# Patient Record
Sex: FEMALE | Race: WHITE | NOT HISPANIC OR LATINO | Employment: FULL TIME | URBAN - METROPOLITAN AREA
[De-identification: names, ages, dates, MRNs, and addresses within clinical notes are randomized per-mention and may not be internally consistent; named-entity substitution may affect disease eponyms.]

---

## 2017-08-17 ASSESSMENT — ENCOUNTER SYMPTOMS
HOT FLASHES: 0
POSTURAL DYSPNEA: 0
SYNCOPE: 0
LEG PAIN: 1
LEG SWELLING: 0
LOSS OF CONSCIOUSNESS: 0
COUGH DISTURBING SLEEP: 0
HYPOTENSION: 0
BACK PAIN: 1
SKIN CHANGES: 0
SPUTUM PRODUCTION: 0
HEADACHES: 0
WEAKNESS: 0
EYE WATERING: 0
ARTHRALGIAS: 1
SEIZURES: 0
WHEEZING: 0
PALPITATIONS: 0
DOUBLE VISION: 0
JOINT SWELLING: 0
PARALYSIS: 0
HYPERTENSION: 0
MUSCLE WEAKNESS: 0
POOR WOUND HEALING: 0
EYE REDNESS: 0
SPEECH CHANGE: 0
SHORTNESS OF BREATH: 0
MUSCLE CRAMPS: 0
STIFFNESS: 1
TINGLING: 0
DYSPNEA ON EXERTION: 1
RESPIRATORY PAIN: 0
EXERCISE INTOLERANCE: 1
NAIL CHANGES: 0
TREMORS: 0
MEMORY LOSS: 1
NECK PAIN: 1
NUMBNESS: 0
HEMOPTYSIS: 0
SNORES LOUDLY: 0
DIZZINESS: 1
ORTHOPNEA: 0
EYE IRRITATION: 1
EYE PAIN: 0
SLEEP DISTURBANCES DUE TO BREATHING: 0
DECREASED LIBIDO: 1
DISTURBANCES IN COORDINATION: 0
LIGHT-HEADEDNESS: 1
CLAUDICATION: 0
COUGH: 0
TACHYCARDIA: 0
MYALGIAS: 1

## 2017-08-17 ASSESSMENT — ACTIVITIES OF DAILY LIVING (ADL)
DO_MEMBERS_OF_YOUR_HOUSEHOLD_WEAR_SEAT_BELTS?: Y
DO_MEMBERS_OF_YOUR_HOUSEHOLD_USE_SAFETY_HELMETS?: Y
DO_MEMBERS_OF_YOUR_HOUSEHOLD_USE_SUNSCREEN?: Y
ARE_THERE_SMOKE_DETECTORS_IN_YOUR_HOME?: Y
ARE_THERE_CARBON_MONOXIDE_DETECTORS_IN_YOUR_HOME?: Y
ARE_THERE_FIREARMS_IN_YOUR_HOME?: N

## 2017-08-31 ENCOUNTER — OFFICE VISIT (OUTPATIENT)
Dept: FAMILY MEDICINE | Facility: CLINIC | Age: 27
End: 2017-08-31

## 2017-08-31 VITALS
BODY MASS INDEX: 18.39 KG/M2 | WEIGHT: 117.4 LBS | HEART RATE: 62 BPM | DIASTOLIC BLOOD PRESSURE: 73 MMHG | RESPIRATION RATE: 20 BRPM | SYSTOLIC BLOOD PRESSURE: 118 MMHG

## 2017-08-31 DIAGNOSIS — E03.9 ACQUIRED HYPOTHYROIDISM: ICD-10-CM

## 2017-08-31 DIAGNOSIS — H53.9 VISION CHANGES: ICD-10-CM

## 2017-08-31 DIAGNOSIS — E03.9 ACQUIRED HYPOTHYROIDISM: Primary | ICD-10-CM

## 2017-08-31 DIAGNOSIS — L70.0 ACNE VULGARIS: ICD-10-CM

## 2017-08-31 DIAGNOSIS — N94.6 DYSMENORRHEA: ICD-10-CM

## 2017-08-31 DIAGNOSIS — Z23 ENCOUNTER FOR IMMUNIZATION: ICD-10-CM

## 2017-08-31 LAB — TSH SERPL DL<=0.005 MIU/L-ACNC: 0.69 MU/L (ref 0.4–4)

## 2017-08-31 RX ORDER — DESOGESTREL AND ETHINYL ESTRADIOL 0.15-0.03
1 KIT ORAL DAILY
Qty: 84 TABLET | Refills: 4 | Status: SHIPPED | OUTPATIENT
Start: 2017-08-31 | End: 2018-09-25

## 2017-08-31 RX ORDER — CLINDAMYCIN PHOSPHATE 10 UG/ML
LOTION TOPICAL 2 TIMES DAILY
Qty: 60 ML | Refills: 3 | Status: SHIPPED | OUTPATIENT
Start: 2017-08-31 | End: 2021-06-16

## 2017-08-31 ASSESSMENT — ANXIETY QUESTIONNAIRES
GAD7 TOTAL SCORE: 11
7. FEELING AFRAID AS IF SOMETHING AWFUL MIGHT HAPPEN: MORE THAN HALF THE DAYS
1. FEELING NERVOUS, ANXIOUS, OR ON EDGE: MORE THAN HALF THE DAYS
5. BEING SO RESTLESS THAT IT IS HARD TO SIT STILL: SEVERAL DAYS
IF YOU CHECKED OFF ANY PROBLEMS ON THIS QUESTIONNAIRE, HOW DIFFICULT HAVE THESE PROBLEMS MADE IT FOR YOU TO DO YOUR WORK, TAKE CARE OF THINGS AT HOME, OR GET ALONG WITH OTHER PEOPLE: VERY DIFFICULT
2. NOT BEING ABLE TO STOP OR CONTROL WORRYING: MORE THAN HALF THE DAYS
3. WORRYING TOO MUCH ABOUT DIFFERENT THINGS: MORE THAN HALF THE DAYS
6. BECOMING EASILY ANNOYED OR IRRITABLE: SEVERAL DAYS

## 2017-08-31 ASSESSMENT — PATIENT HEALTH QUESTIONNAIRE - PHQ9
5. POOR APPETITE OR OVEREATING: SEVERAL DAYS
SUM OF ALL RESPONSES TO PHQ QUESTIONS 1-9: 4

## 2017-08-31 ASSESSMENT — PAIN SCALES - GENERAL: PAINLEVEL: NO PAIN (0)

## 2017-08-31 NOTE — MR AVS SNAPSHOT
After Visit Summary   8/31/2017    Mikaela Vernon    MRN: [id number]           Patient Information     Date Of Birth          1990        Visit Information        Provider Department      8/31/2017 8:55 AM Seth Albarran MD [facility]        Today's Diagnoses     Acquired hypothyroidism    -  1    Dysmenorrhea        Encounter for immunization        Vision changes        Acne vulgaris          Care Instructions    Primary Care Center Medication Refill Request Information:  * Please contact your pharmacy regarding ANY request for medication refills.  ** [facility] Prescription Fax = [phone]  * Please allow 3 business days for routine medication refills.  * Please allow 5 business days for controlled substance medication refills.     Primary Care Center Test Result notification information:  *You will be notified with in 7-10 days of your appointment day regarding the results of your test.  If you are on MyChart you will be notified as soon as the provider has reviewed the results and signed off on them.    Primary Care Center [phone]   Mikaela was seen today for medication refill.    Diagnoses and all orders for this visit:    Acquired hypothyroidism  -     TSH with free T4 reflex; Future    Dysmenorrhea  -     desogestrel-ethinyl estradiol (APRI) 0.15-30 MG-MCG per tablet; Take 1 tablet by mouth daily    Encounter for immunization  -     HPV VACCINE (GARDASIL), QUADRAVALENT    Vision changes  -     OPHTHALMOLOGY ADULT REFERRAL    Acne vulgaris  -     clindamycin (CLEOCIN T) 1 % lotion; Apply topically 2 times daily    Annual visit  Best wishes,  Seth Albarran MD          Follow-ups after your visit        Additional Services     OPHTHALMOLOGY ADULT REFERRAL       Your provider has referred you to: [facility]: Eye Clinic [facility] [phone]   [email or web address]    Please be aware that coverage of these services is subject to the  terms and limitations of your health insurance plan.  Call member services at your health plan with any benefit or coverage questions.      Please bring the following with you to your appointment:    (1) Any X-Rays, CTs or MRIs which have been performed.  Contact the facility where they were done to arrange for  prior to your scheduled appointment.    (2) List of current medications  (3) This referral request   (4) Any documents/labs given to you for this referral                  Follow-up notes from your care team     Write patient with results Return in about 1 year (around 8/31/2018).      Your next 10 appointments already scheduled     Aug 31, 2017 10:30 AM CDT   LAB with  LAB   [facility] ([facility])    [address]  [address] [facility] [place] [facility] [address] [phone]           Patient must bring picture ID. Patient should be prepared to give a urine specimen  Please do not eat 10-12 hours before your appointment if you are coming in fasting for labs on lipids, cholesterol, or glucose (sugar). Pregnant women should follow their Care Team instructions. Water with medications is okay. Do not drink coffee or other fluids. If you have concerns about taking  your medications, please ask at office or if scheduling via [employer], send a message by clicking on Secure Messaging, Message Your Care Team.            Sep 05, 2017  1:00 PM CDT   (Arrive by 12:45 PM)   NEW GENERAL with [facility] ([facility])    [address] [place] [address]  [address]th [facility] [address] [phone]              Future tests that were ordered for you today     Open Future Orders        Priority Expected Expires Ordered    TSH with free T4 reflex Routine 8/31/2017 8/31/2018 8/31/2017            Who to contact     Please call your clinic at [phone] to:    Ask questions about your health    Make or cancel appointments    Discuss your  medicines    Learn about your test results    Speak to your doctor   If you have compliments or concerns about an experience at your clinic, or if you wish to file a complaint, please contact [facility] Patient Relations at [phone] or email us at [email or web address]         Additional Information About Your Visit        [facility] Information     [employer] gives you secure access to your electronic health record. If you see a primary care provider, you can also send messages to your care team and make appointments. If you have questions, please call your primary care clinic.  If you do not have a primary care provider, please call [phone] and they will assist you.      [employer] is an electronic gateway that provides easy, online access to your medical records. With [employer], you can request a clinic appointment, read your test results, renew a prescription or communicate with your care team.     To access your existing account, please contact your [facility] or call [phone] for assistance.        Care EveryWhere ID     This is your Care EveryWhere ID. This could be used by other organizations to access your [place] medical records  [id number]        Your Vitals Were     Pulse Respirations BMI (Body Mass Index)             62 20 18.39 kg/m2          Blood Pressure from Last 3 Encounters:   08/31/17 118/73   11/30/16 121/84   11/07/16 104/71    Weight from Last 3 Encounters:   08/31/17 53.3 kg (117 lb 6.4 oz)   11/30/16 53.1 kg (117 lb 1.6 oz)   11/07/16 50.3 kg (111 lb)              We Performed the Following     HPV VACCINE (GARDASIL), QUADRAVALENT     OPHTHALMOLOGY ADULT REFERRAL          Today's Medication Changes          These changes are accurate as of: 8/31/17 10:03 AM.  If you have any questions, ask your nurse or doctor.               Start taking these medicines.        Dose/Directions    clindamycin 1 % lotion   Commonly known  as:  CLEOCIN T   Used for:  Acne vulgaris   Started by:  Seth Albarran MD        Apply topically 2 times daily   Quantity:  60 mL   Refills:  3         Stop taking these medicines if you haven't already. Please contact your care team if you have questions.     BENEFIBER Powd   Stopped by:  Seth Albarran MD           BENEFIBER Tabs   Stopped by:  Seth Albarran MD           methylcellulose 500 MG Tabs tablet   Commonly known as:  CITRUCEL   Stopped by:  Seth Albarran MD                Where to get your medicines      These medications were sent to [id number] - [place], [place] - [address] AT [facility] [address]  [address], [place] [address]     Phone:  [phone]     clindamycin 1 % lotion    desogestrel-ethinyl estradiol 0.15-30 MG-MCG per tablet                Primary Care Provider Office Phone # Fax #    Seth Albarran [phone]       [address] [facility] [address]        Equal Access to Services     [facility]: Hadii aad ku hadasho Soomaali, waaxda luqadaha, qaybta kaalmada adeegyada, waxze cruz . So [facility] [phone].    ATENCIÓN: Si habla español, tiene a brunner disposición servicios gratuitos de asistencia lingüística. Llame al [phone].    We comply with applicable federal civil rights laws and [place] laws. We do not discriminate on the basis of race, color, national origin, age, disability sex, sexual orientation or gender identity.            Thank you!     Thank you for choosing [facility]  for your care. Our goal is always to provide you with excellent care. Hearing back from our patients is one way we can continue to improve our services. Please take a few minutes to complete the written survey that you may receive in the mail after your visit with us. Thank you!             Your Updated Medication List - Protect others around you: Learn how to safely use,  store and throw away your medicines at www.disposemymeds.org.          This list is accurate as of: 8/31/17 10:03 AM.  Always use your most recent med list.                   Brand Name Dispense Instructions for use Diagnosis    clindamycin 1 % lotion    CLEOCIN T    60 mL    Apply topically 2 times daily    Acne vulgaris       desogestrel-ethinyl estradiol 0.15-30 MG-MCG per tablet    APRI    84 tablet    Take 1 tablet by mouth daily    Dysmenorrhea       levothyroxine 100 MCG tablet    SYNTHROID/LEVOTHROID    90 tablet    Take 1 tablet (100 mcg) by mouth daily    Acquired hypothyroidism       ONE-A-DAY WOMENS PO

## 2017-08-31 NOTE — PATIENT INSTRUCTIONS
[facility] Medication Refill Request Information:  * Please contact your pharmacy regarding ANY request for medication refills.  ** [facility] Prescription Fax = [phone]  * Please allow 3 business days for routine medication refills.  * Please allow 5 business days for controlled substance medication refills.     [facility] Test Result notification information:  *You will be notified with in 7-10 days of your appointment day regarding the results of your test.  If you are on MyChart you will be notified as soon as the provider has reviewed the results and signed off on them.    [facility] [phone]   Mikaela was seen today for medication refill.    Diagnoses and all orders for this visit:    Acquired hypothyroidism  -     TSH with free T4 reflex; Future    Dysmenorrhea  -     desogestrel-ethinyl estradiol (APRI) 0.15-30 MG-MCG per tablet; Take 1 tablet by mouth daily    Encounter for immunization  -     HPV VACCINE (GARDASIL), QUADRAVALENT    Vision changes  -     OPHTHALMOLOGY ADULT REFERRAL    Acne vulgaris  -     clindamycin (CLEOCIN T) 1 % lotion; Apply topically 2 times daily    Annual visit  Best Seth berger MD
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
## 2017-08-31 NOTE — NURSING NOTE
Chief Complaint   Patient presents with     Medication Refill     patient states she is here for a medication refill     MORE RUVALCABA at 9:06 AM on 8/31/2017.

## 2017-08-31 NOTE — NURSING NOTE
Patient received HPV # 3 vaccine.  See immunization list for administration details.  Patient tolerated injection well.     MORE RUVALCABA at 10:16 AM on 8/31/2017.

## 2017-09-01 ASSESSMENT — ANXIETY QUESTIONNAIRES: GAD7 TOTAL SCORE: 11

## 2017-10-10 DIAGNOSIS — E03.9 ACQUIRED HYPOTHYROIDISM: ICD-10-CM

## 2017-10-10 RX ORDER — LEVOTHYROXINE SODIUM 100 UG/1
100 TABLET ORAL DAILY
Qty: 90 TABLET | Refills: 2 | Status: SHIPPED | OUTPATIENT
Start: 2017-10-10 | End: 2018-06-23

## 2017-10-10 NOTE — TELEPHONE ENCOUNTER
levothyroxine   Last Written Prescription Date:  9/29/16  Last Fill Quantity: 90,   # refills: 3  Last Office Visit : 8/31/17  Future Office visit:  no  TSH   Date Value Ref Range Status   08/31/2017 0.69 0.40 - 4.00 mU/L Final

## 2017-12-13 ENCOUNTER — OFFICE VISIT (OUTPATIENT)
Dept: FAMILY MEDICINE | Facility: CLINIC | Age: 27
End: 2017-12-13
Payer: COMMERCIAL

## 2017-12-13 VITALS
WEIGHT: 124 LBS | TEMPERATURE: 98.2 F | DIASTOLIC BLOOD PRESSURE: 71 MMHG | BODY MASS INDEX: 19.46 KG/M2 | SYSTOLIC BLOOD PRESSURE: 104 MMHG | OXYGEN SATURATION: 98 % | HEART RATE: 72 BPM | HEIGHT: 67 IN

## 2017-12-13 DIAGNOSIS — R52 GENERALIZED PAIN: Primary | ICD-10-CM

## 2017-12-13 DIAGNOSIS — Z76.89 ESTABLISHING CARE WITH NEW DOCTOR, ENCOUNTER FOR: ICD-10-CM

## 2017-12-13 LAB
ERYTHROCYTE [SEDIMENTATION RATE] IN BLOOD: 10 MM/HR (ref 0–20)
RHEUMATOID FACT SERPL-ACNC: <15 IU/ML (ref 0–30)
TSH SERPL DL<=0.05 MIU/L-ACNC: 3.81 UIU/ML (ref 0.3–5)

## 2017-12-13 ASSESSMENT — PATIENT HEALTH QUESTIONNAIRE - PHQ9: SUM OF ALL RESPONSES TO PHQ QUESTIONS 1-9: 12

## 2017-12-13 NOTE — MR AVS SNAPSHOT
After Visit Summary   12/13/2017    Mikaela Vernon    MRN: [id number]           Patient Information     Date Of Birth          1990        Visit Information        Provider Department      12/13/2017 9:20 AM Tawny Price,  [facility]        Today's Diagnoses     Generalized pain    -  1      Care Instructions    It was nice to meet you today.  Keep walking your dog, and doing physical activity that you are able to tolerate.    We are checking labs today and we can talk about them at a follow up visit.    Return for follow up in the next 2-3 weeks.  Call if you have any questions or concerns.          Follow-ups after your visit        Who to contact     Please call your clinic at [phone] to:    Ask questions about your health    Make or cancel appointments    Discuss your medicines    Learn about your test results    Speak to your doctor   If you have compliments or concerns about an experience at your clinic, or if you wish to file a complaint, please contact [facility] Patient Relations at [phone] or email us at [email or web address]         Additional Information About Your Visit        MyChart Information     [employer] gives you secure access to your electronic health record. If you see a primary care provider, you can also send messages to your care team and make appointments. If you have questions, please call your primary care clinic.  If you do not have a primary care provider, please call [phone] and they will assist you.      [employer] is an electronic gateway that provides easy, online access to your medical records. With [employer], you can request a clinic appointment, read your test results, renew a prescription or communicate with your care team.     To access your existing account, please contact your [facility] or call [phone] for assistance.        Care EveryWhere ID     This is your  "Care EveryWhere ID. This could be used by other organizations to access your [place] medical records  [id number]        Your Vitals Were     Pulse Temperature Height Last Period Pulse Oximetry BMI (Body Mass Index)    72 98.2  F (36.8  C) 5' 7\" (170.2 cm) 11/01/2017 98% 19.42 kg/m2       Blood Pressure from Last 3 Encounters:   12/13/17 104/71   08/31/17 118/73   11/30/16 121/84    Weight from Last 3 Encounters:   12/13/17 124 lb (56.2 kg)   08/31/17 117 lb 6.4 oz (53.3 kg)   11/30/16 117 lb 1.6 oz (53.1 kg)              We Performed the Following     Antinuclear Ab [place] ([facility])     Erythrocyte Sedimentation Rate ([facility])     Rheumatoid Factor Quant ([facility])     TSH  Sensitive ([facility])        Primary Care Provider Office Phone # Fax #    Seth Albarran [phone]       [address] [facility] [address]        Equal Access to Services     [facility]: Hadii honey ku hadasho Soomaali, waaxda luqadaha, qaybta kaalmada adeegyajay, fe cruz . So [facility] [phone].    ATENCIÓN: Si habla español, tiene a brunner disposición servicios gratuitos de asistencia lingüística. Llame al [phone].    We comply with applicable federal civil rights laws and [place] laws. We do not discriminate on the basis of race, color, national origin, age, disability, sex, sexual orientation, or gender identity.            Thank you!     Thank you for choosing [facility]  for your care. Our goal is always to provide you with excellent care. Hearing back from our patients is one way we can continue to improve our services. Please take a few minutes to complete the written survey that you may receive in the mail after your visit with us. Thank you!             Your Updated Medication List - Protect others around you: Learn how to safely use, store and throw away your medicines at www.disposemymeds.org.          This list is accurate as of: 12/13/17 10:34 AM.  Always " use your most recent med list.                   Brand Name Dispense Instructions for use Diagnosis    clindamycin 1 % lotion    CLEOCIN T    60 mL    Apply topically 2 times daily    Acne vulgaris       desogestrel-ethinyl estradiol 0.15-30 MG-MCG per tablet    APRI    84 tablet    Take 1 tablet by mouth daily    Dysmenorrhea       levothyroxine 100 MCG tablet    SYNTHROID/LEVOTHROID    90 tablet    Take 1 tablet (100 mcg) by mouth daily    Acquired hypothyroidism       ONE-A-DAY WOMENS PO

## 2017-12-13 NOTE — PATIENT INSTRUCTIONS
It was nice to meet you today.  Keep walking your dog, and doing physical activity that you are able to tolerate.    We are checking labs today and we can talk about them at a follow up visit.    Return for follow up in the next 2-3 weeks.  Call if you have any questions or concerns.
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
## 2017-12-13 NOTE — PROGRESS NOTES
Preceptor attestation:  Patient seen and discussed with the resident. Assessment and plan reviewed with resident and agreed upon.  Supervising physician: Stevne Farfan  [facility]

## 2017-12-14 LAB — ANA SER QL: 1.7 U

## 2018-01-30 ENCOUNTER — COMMUNICATION - HEALTHEAST (OUTPATIENT)
Dept: TELEHEALTH | Facility: CLINIC | Age: 28
End: 2018-01-30

## 2018-01-30 ENCOUNTER — OFFICE VISIT - HEALTHEAST (OUTPATIENT)
Dept: FAMILY MEDICINE | Facility: CLINIC | Age: 28
End: 2018-01-30

## 2018-01-30 DIAGNOSIS — F32.A DEPRESSION: ICD-10-CM

## 2018-01-30 DIAGNOSIS — F41.9 ANXIETY: ICD-10-CM

## 2018-01-30 DIAGNOSIS — K64.9 HEMORRHOID: ICD-10-CM

## 2018-01-30 DIAGNOSIS — G89.29 CHRONIC PAIN: ICD-10-CM

## 2018-01-30 DIAGNOSIS — Z82.69 FAMILY HISTORY OF FIBROMYALGIA: ICD-10-CM

## 2018-01-30 DIAGNOSIS — E03.9 HYPOTHYROID: ICD-10-CM

## 2018-01-30 DIAGNOSIS — F84.0 AUTISM: ICD-10-CM

## 2018-01-30 DIAGNOSIS — F43.10 PTSD (POST-TRAUMATIC STRESS DISORDER): ICD-10-CM

## 2018-01-30 ASSESSMENT — MIFFLIN-ST. JEOR: SCORE: 1287.88

## 2018-02-19 ENCOUNTER — COMMUNICATION - HEALTHEAST (OUTPATIENT)
Dept: FAMILY MEDICINE | Facility: CLINIC | Age: 28
End: 2018-02-19

## 2018-03-19 ENCOUNTER — AMBULATORY - HEALTHEAST (OUTPATIENT)
Dept: RHEUMATOLOGY | Facility: CLINIC | Age: 28
End: 2018-03-19

## 2018-03-27 ENCOUNTER — COMMUNICATION - HEALTHEAST (OUTPATIENT)
Dept: RHEUMATOLOGY | Facility: CLINIC | Age: 28
End: 2018-03-27

## 2018-03-27 ENCOUNTER — AMBULATORY - HEALTHEAST (OUTPATIENT)
Dept: RHEUMATOLOGY | Facility: CLINIC | Age: 28
End: 2018-03-27

## 2018-03-27 ENCOUNTER — OFFICE VISIT - HEALTHEAST (OUTPATIENT)
Dept: RHEUMATOLOGY | Facility: CLINIC | Age: 28
End: 2018-03-27

## 2018-03-27 DIAGNOSIS — G89.29 CHRONIC BILATERAL THORACIC BACK PAIN: ICD-10-CM

## 2018-03-27 DIAGNOSIS — R74.8 ELEVATED CK: ICD-10-CM

## 2018-03-27 DIAGNOSIS — M79.605 PAIN IN BOTH LOWER EXTREMITIES: ICD-10-CM

## 2018-03-27 DIAGNOSIS — Z86.59 HISTORY OF POSTTRAUMATIC STRESS DISORDER (PTSD): ICD-10-CM

## 2018-03-27 DIAGNOSIS — M70.50 ANSERINE BURSITIS: ICD-10-CM

## 2018-03-27 DIAGNOSIS — M70.62 TROCHANTERIC BURSITIS OF BOTH HIPS: ICD-10-CM

## 2018-03-27 DIAGNOSIS — F41.9 ANXIETY: ICD-10-CM

## 2018-03-27 DIAGNOSIS — E03.9 HYPOTHYROID: ICD-10-CM

## 2018-03-27 DIAGNOSIS — M79.604 PAIN IN BOTH LOWER EXTREMITIES: ICD-10-CM

## 2018-03-27 DIAGNOSIS — M54.6 CHRONIC BILATERAL THORACIC BACK PAIN: ICD-10-CM

## 2018-03-27 DIAGNOSIS — M54.5 CHRONIC BILATERAL LOW BACK PAIN, WITH SCIATICA PRESENCE UNSPECIFIED: ICD-10-CM

## 2018-03-27 DIAGNOSIS — M70.61 TROCHANTERIC BURSITIS OF BOTH HIPS: ICD-10-CM

## 2018-03-27 DIAGNOSIS — G89.29 CHRONIC BILATERAL LOW BACK PAIN, WITH SCIATICA PRESENCE UNSPECIFIED: ICD-10-CM

## 2018-03-27 DIAGNOSIS — M79.7 FIBROMYALGIA: ICD-10-CM

## 2018-03-27 LAB
25(OH)D3 SERPL-MCNC: 62.4 NG/ML (ref 30–80)
ALBUMIN SERPL-MCNC: 3.7 G/DL (ref 3.5–5)
ALT SERPL W P-5'-P-CCNC: 13 U/L (ref 0–45)
AST SERPL W P-5'-P-CCNC: 19 U/L (ref 0–40)
C REACTIVE PROTEIN LHE: 0.3 MG/DL (ref 0–0.8)
CALCIUM SERPL-MCNC: 9.5 MG/DL (ref 8.5–10.5)
CCP AB SER IA-ACNC: 5 U/ML
CK SERPL-CCNC: 390 U/L (ref 30–190)
CREAT SERPL-MCNC: 0.78 MG/DL (ref 0.6–1.1)
ERYTHROCYTE [DISTWIDTH] IN BLOOD BY AUTOMATED COUNT: 11.9 % (ref 11–14.5)
ERYTHROCYTE [SEDIMENTATION RATE] IN BLOOD BY WESTERGREN METHOD: 10 MM/HR (ref 0–20)
GFR SERPL CREATININE-BSD FRML MDRD: >60 ML/MIN/1.73M2
HCT VFR BLD AUTO: 36.9 % (ref 35–47)
HGB BLD-MCNC: 12.9 G/DL (ref 12–16)
MCH RBC QN AUTO: 31.3 PG (ref 27–34)
MCHC RBC AUTO-ENTMCNC: 34.8 G/DL (ref 32–36)
MCV RBC AUTO: 90 FL (ref 80–100)
PLATELET # BLD AUTO: 311 THOU/UL (ref 140–440)
PMV BLD AUTO: 7.3 FL (ref 7–10)
RBC # BLD AUTO: 4.11 MILL/UL (ref 3.8–5.4)
WBC: 5.2 THOU/UL (ref 4–11)

## 2018-03-27 ASSESSMENT — MIFFLIN-ST. JEOR: SCORE: 1319.63

## 2018-03-28 LAB
HCV AB SERPL QL IA: NEGATIVE
HLA-B27 RESULT - HISTORICAL: NEGATIVE
INTERPRETATION: NORMAL
TSH SERPL DL<=0.005 MIU/L-ACNC: 85.98 UIU/ML (ref 0.3–5)

## 2018-03-29 ENCOUNTER — COMMUNICATION - HEALTHEAST (OUTPATIENT)
Dept: RHEUMATOLOGY | Facility: CLINIC | Age: 28
End: 2018-03-29

## 2018-03-29 ENCOUNTER — AMBULATORY - HEALTHEAST (OUTPATIENT)
Dept: RHEUMATOLOGY | Facility: CLINIC | Age: 28
End: 2018-03-29

## 2018-03-29 DIAGNOSIS — Z82.69 FAMILY HISTORY OF FIBROMYALGIA: ICD-10-CM

## 2018-03-29 DIAGNOSIS — G89.29 CHRONIC PAIN: ICD-10-CM

## 2018-04-17 ENCOUNTER — OFFICE VISIT - HEALTHEAST (OUTPATIENT)
Dept: PHYSICAL THERAPY | Facility: REHABILITATION | Age: 28
End: 2018-04-17

## 2018-04-17 DIAGNOSIS — G89.4 CHRONIC PAIN SYNDROME: ICD-10-CM

## 2018-04-17 DIAGNOSIS — M62.81 GENERALIZED MUSCLE WEAKNESS: ICD-10-CM

## 2018-04-17 DIAGNOSIS — M79.7 FIBROMYALGIA: ICD-10-CM

## 2018-04-19 ENCOUNTER — COMMUNICATION - HEALTHEAST (OUTPATIENT)
Dept: RHEUMATOLOGY | Facility: CLINIC | Age: 28
End: 2018-04-19

## 2018-05-01 ENCOUNTER — OFFICE VISIT - HEALTHEAST (OUTPATIENT)
Dept: PHYSICAL THERAPY | Facility: REHABILITATION | Age: 28
End: 2018-05-01

## 2018-05-01 DIAGNOSIS — M62.81 GENERALIZED MUSCLE WEAKNESS: ICD-10-CM

## 2018-05-01 DIAGNOSIS — M79.7 FIBROMYALGIA: ICD-10-CM

## 2018-05-01 DIAGNOSIS — G89.4 CHRONIC PAIN SYNDROME: ICD-10-CM

## 2018-05-08 ENCOUNTER — OFFICE VISIT - HEALTHEAST (OUTPATIENT)
Dept: BEHAVIORAL HEALTH | Facility: CLINIC | Age: 28
End: 2018-05-08

## 2018-05-08 ENCOUNTER — COMMUNICATION - HEALTHEAST (OUTPATIENT)
Dept: PHYSICAL THERAPY | Facility: REHABILITATION | Age: 28
End: 2018-05-08

## 2018-05-08 DIAGNOSIS — Z86.59 HISTORY OF POSTTRAUMATIC STRESS DISORDER (PTSD): ICD-10-CM

## 2018-05-08 DIAGNOSIS — F33.1 MAJOR DEPRESSIVE DISORDER, RECURRENT EPISODE, MODERATE WITH ANXIOUS DISTRESS (H): ICD-10-CM

## 2018-05-08 DIAGNOSIS — F41.0 PANIC DISORDER WITHOUT AGORAPHOBIA: ICD-10-CM

## 2018-05-08 DIAGNOSIS — M79.7 FIBROMYALGIA: ICD-10-CM

## 2018-05-08 DIAGNOSIS — F41.9 ANXIETY: ICD-10-CM

## 2018-05-22 ENCOUNTER — OFFICE VISIT - HEALTHEAST (OUTPATIENT)
Dept: FAMILY MEDICINE | Facility: CLINIC | Age: 28
End: 2018-05-22

## 2018-05-22 ENCOUNTER — AMBULATORY - HEALTHEAST (OUTPATIENT)
Dept: FAMILY MEDICINE | Facility: CLINIC | Age: 28
End: 2018-05-22

## 2018-05-22 DIAGNOSIS — E03.9 HYPOTHYROID: ICD-10-CM

## 2018-05-22 DIAGNOSIS — Z82.69 FAMILY HISTORY OF FIBROMYALGIA: ICD-10-CM

## 2018-05-22 DIAGNOSIS — G89.4 CHRONIC PAIN SYNDROME: ICD-10-CM

## 2018-05-22 DIAGNOSIS — N94.6 DYSMENORRHEA: ICD-10-CM

## 2018-05-22 LAB
T4 FREE SERPL-MCNC: 1.1 NG/DL (ref 0.7–1.8)
TSH SERPL DL<=0.005 MIU/L-ACNC: 8.6 UIU/ML (ref 0.3–5)

## 2018-05-23 ENCOUNTER — COMMUNICATION - HEALTHEAST (OUTPATIENT)
Dept: FAMILY MEDICINE | Facility: CLINIC | Age: 28
End: 2018-05-23

## 2018-05-29 ENCOUNTER — OFFICE VISIT - HEALTHEAST (OUTPATIENT)
Dept: RHEUMATOLOGY | Facility: CLINIC | Age: 28
End: 2018-05-29

## 2018-05-29 DIAGNOSIS — M54.5 CHRONIC BILATERAL LOW BACK PAIN, WITH SCIATICA PRESENCE UNSPECIFIED: ICD-10-CM

## 2018-05-29 DIAGNOSIS — R74.8 ELEVATED CK: ICD-10-CM

## 2018-05-29 DIAGNOSIS — G89.29 CHRONIC BILATERAL LOW BACK PAIN, WITH SCIATICA PRESENCE UNSPECIFIED: ICD-10-CM

## 2018-05-29 DIAGNOSIS — G89.29 CHRONIC PAIN: ICD-10-CM

## 2018-05-29 DIAGNOSIS — M25.50 MULTIPLE JOINT PAIN: ICD-10-CM

## 2018-05-29 DIAGNOSIS — M79.7 FIBROMYALGIA: ICD-10-CM

## 2018-05-29 DIAGNOSIS — M79.604 PAIN IN BOTH LOWER EXTREMITIES: ICD-10-CM

## 2018-05-29 DIAGNOSIS — M70.50 ANSERINE BURSITIS: ICD-10-CM

## 2018-05-29 DIAGNOSIS — R76.8 POSITIVE ANTI-CCP TEST: ICD-10-CM

## 2018-05-29 DIAGNOSIS — Z82.69 FAMILY HISTORY OF FIBROMYALGIA: ICD-10-CM

## 2018-05-29 DIAGNOSIS — M79.605 PAIN IN BOTH LOWER EXTREMITIES: ICD-10-CM

## 2018-05-29 LAB
C REACTIVE PROTEIN LHE: 0.4 MG/DL (ref 0–0.8)
CK SERPL-CCNC: 64 U/L (ref 30–190)
ERYTHROCYTE [SEDIMENTATION RATE] IN BLOOD BY WESTERGREN METHOD: 16 MM/HR (ref 0–20)

## 2018-05-29 ASSESSMENT — MIFFLIN-ST. JEOR: SCORE: 1306.02

## 2018-05-31 ENCOUNTER — COMMUNICATION - HEALTHEAST (OUTPATIENT)
Dept: RHEUMATOLOGY | Facility: CLINIC | Age: 28
End: 2018-05-31

## 2018-05-31 DIAGNOSIS — M54.50 CHRONIC BILATERAL LOW BACK PAIN WITHOUT SCIATICA: ICD-10-CM

## 2018-05-31 DIAGNOSIS — M70.50 ANSERINE BURSITIS: ICD-10-CM

## 2018-05-31 DIAGNOSIS — G89.29 CHRONIC BILATERAL LOW BACK PAIN WITHOUT SCIATICA: ICD-10-CM

## 2018-05-31 DIAGNOSIS — M79.7 FIBROMYALGIA: ICD-10-CM

## 2018-05-31 DIAGNOSIS — M70.62 TROCHANTERIC BURSITIS OF BOTH HIPS: ICD-10-CM

## 2018-05-31 DIAGNOSIS — M70.61 TROCHANTERIC BURSITIS OF BOTH HIPS: ICD-10-CM

## 2018-06-15 ENCOUNTER — COMMUNICATION - HEALTHEAST (OUTPATIENT)
Dept: RHEUMATOLOGY | Facility: CLINIC | Age: 28
End: 2018-06-15

## 2018-06-18 ENCOUNTER — COMMUNICATION - HEALTHEAST (OUTPATIENT)
Dept: RHEUMATOLOGY | Facility: CLINIC | Age: 28
End: 2018-06-18

## 2018-06-23 DIAGNOSIS — E03.9 ACQUIRED HYPOTHYROIDISM: ICD-10-CM

## 2018-06-27 RX ORDER — LEVOTHYROXINE SODIUM 100 UG/1
TABLET ORAL
Qty: 90 TABLET | Refills: 1 | Status: SHIPPED | OUTPATIENT
Start: 2018-06-27 | End: 2021-07-21

## 2018-08-30 ENCOUNTER — OFFICE VISIT - HEALTHEAST (OUTPATIENT)
Dept: RHEUMATOLOGY | Facility: CLINIC | Age: 28
End: 2018-08-30

## 2018-08-30 DIAGNOSIS — M70.50 ANSERINE BURSITIS: ICD-10-CM

## 2018-08-30 DIAGNOSIS — M70.62 TROCHANTERIC BURSITIS OF BOTH HIPS: ICD-10-CM

## 2018-08-30 DIAGNOSIS — R76.8 POSITIVE ANTI-CCP TEST: ICD-10-CM

## 2018-08-30 DIAGNOSIS — R74.8 ELEVATED CK: ICD-10-CM

## 2018-08-30 DIAGNOSIS — G89.29 CHRONIC BILATERAL LOW BACK PAIN, WITH SCIATICA PRESENCE UNSPECIFIED: ICD-10-CM

## 2018-08-30 DIAGNOSIS — M79.7 FIBROMYALGIA: ICD-10-CM

## 2018-08-30 DIAGNOSIS — M70.61 TROCHANTERIC BURSITIS OF BOTH HIPS: ICD-10-CM

## 2018-08-30 DIAGNOSIS — Z79.1 NSAID LONG-TERM USE: ICD-10-CM

## 2018-08-30 DIAGNOSIS — M54.5 CHRONIC BILATERAL LOW BACK PAIN, WITH SCIATICA PRESENCE UNSPECIFIED: ICD-10-CM

## 2018-08-30 ASSESSMENT — MIFFLIN-ST. JEOR: SCORE: 1260.66

## 2018-09-06 ENCOUNTER — COMMUNICATION - HEALTHEAST (OUTPATIENT)
Dept: RHEUMATOLOGY | Facility: CLINIC | Age: 28
End: 2018-09-06

## 2018-09-06 DIAGNOSIS — M54.50 CHRONIC BILATERAL LOW BACK PAIN WITHOUT SCIATICA: ICD-10-CM

## 2018-09-06 DIAGNOSIS — M79.7 FIBROMYALGIA: ICD-10-CM

## 2018-09-06 DIAGNOSIS — G89.29 CHRONIC BILATERAL LOW BACK PAIN WITHOUT SCIATICA: ICD-10-CM

## 2018-09-06 DIAGNOSIS — M70.50 ANSERINE BURSITIS: ICD-10-CM

## 2018-09-06 DIAGNOSIS — M70.62 TROCHANTERIC BURSITIS OF BOTH HIPS: ICD-10-CM

## 2018-09-06 DIAGNOSIS — M70.61 TROCHANTERIC BURSITIS OF BOTH HIPS: ICD-10-CM

## 2018-09-17 ENCOUNTER — AMBULATORY - HEALTHEAST (OUTPATIENT)
Dept: LAB | Facility: CLINIC | Age: 28
End: 2018-09-17

## 2018-09-17 DIAGNOSIS — Z79.1 NSAID LONG-TERM USE: ICD-10-CM

## 2018-09-17 DIAGNOSIS — R74.8 ELEVATED CK: ICD-10-CM

## 2018-09-17 LAB
ALBUMIN SERPL-MCNC: 3.9 G/DL (ref 3.5–5)
ALT SERPL W P-5'-P-CCNC: 29 U/L (ref 0–45)
AST SERPL W P-5'-P-CCNC: 21 U/L (ref 0–40)
CK SERPL-CCNC: 53 U/L (ref 30–190)
CREAT SERPL-MCNC: 0.74 MG/DL (ref 0.6–1.1)
GFR SERPL CREATININE-BSD FRML MDRD: >60 ML/MIN/1.73M2

## 2018-09-25 DIAGNOSIS — N94.6 DYSMENORRHEA: ICD-10-CM

## 2018-09-27 DIAGNOSIS — N94.6 DYSMENORRHEA: ICD-10-CM

## 2018-09-27 RX ORDER — DESOGESTREL AND ETHINYL ESTRADIOL 0.15-0.03
KIT ORAL
Qty: 84 TABLET | Refills: 3 | Status: SHIPPED | OUTPATIENT
Start: 2018-09-27 | End: 2021-08-20

## 2018-09-28 RX ORDER — DESOGESTREL AND ETHINYL ESTRADIOL 0.15-0.03
KIT ORAL
Qty: 84 TABLET | Refills: 0 | OUTPATIENT
Start: 2018-09-28

## 2018-10-08 ENCOUNTER — COMMUNICATION - HEALTHEAST (OUTPATIENT)
Dept: FAMILY MEDICINE | Facility: CLINIC | Age: 28
End: 2018-10-08

## 2018-10-08 DIAGNOSIS — E03.9 HYPOTHYROID: ICD-10-CM

## 2018-11-08 ENCOUNTER — OFFICE VISIT - HEALTHEAST (OUTPATIENT)
Dept: INTERNAL MEDICINE | Facility: CLINIC | Age: 28
End: 2018-11-08

## 2018-11-08 DIAGNOSIS — F50.9 EATING DISORDER: ICD-10-CM

## 2018-11-08 DIAGNOSIS — F43.10 PTSD (POST-TRAUMATIC STRESS DISORDER): ICD-10-CM

## 2018-11-08 DIAGNOSIS — Z12.4 CERVICAL CANCER SCREENING: ICD-10-CM

## 2018-11-08 DIAGNOSIS — M79.7 FIBROMYALGIA: ICD-10-CM

## 2018-11-08 DIAGNOSIS — E03.9 HYPOTHYROID: ICD-10-CM

## 2018-11-08 DIAGNOSIS — F32.A DEPRESSION: ICD-10-CM

## 2018-11-08 LAB — TSH SERPL DL<=0.005 MIU/L-ACNC: 0.91 UIU/ML (ref 0.3–5)

## 2018-11-28 ENCOUNTER — OFFICE VISIT - HEALTHEAST (OUTPATIENT)
Dept: MIDWIFE SERVICES | Facility: CLINIC | Age: 28
End: 2018-11-28

## 2018-11-28 DIAGNOSIS — Z00.00 HEALTHCARE MAINTENANCE: ICD-10-CM

## 2018-11-28 DIAGNOSIS — Z01.419 ENCOUNTER FOR GYNECOLOGICAL EXAMINATION WITHOUT ABNORMAL FINDING: ICD-10-CM

## 2018-11-28 DIAGNOSIS — N94.6 DYSMENORRHEA: ICD-10-CM

## 2018-11-28 ASSESSMENT — MIFFLIN-ST. JEOR: SCORE: 1260.66

## 2018-12-14 ENCOUNTER — COMMUNICATION - HEALTHEAST (OUTPATIENT)
Dept: RHEUMATOLOGY | Facility: CLINIC | Age: 28
End: 2018-12-14

## 2018-12-14 DIAGNOSIS — G89.29 CHRONIC BILATERAL LOW BACK PAIN, WITH SCIATICA PRESENCE UNSPECIFIED: ICD-10-CM

## 2018-12-14 DIAGNOSIS — M79.7 FIBROMYALGIA: ICD-10-CM

## 2018-12-14 DIAGNOSIS — M54.5 CHRONIC BILATERAL LOW BACK PAIN, WITH SCIATICA PRESENCE UNSPECIFIED: ICD-10-CM

## 2018-12-26 ENCOUNTER — RECORDS - HEALTHEAST (OUTPATIENT)
Dept: ADMINISTRATIVE | Facility: OTHER | Age: 28
End: 2018-12-26

## 2019-01-21 ENCOUNTER — COMMUNICATION - HEALTHEAST (OUTPATIENT)
Dept: RHEUMATOLOGY | Facility: CLINIC | Age: 29
End: 2019-01-21

## 2019-01-21 DIAGNOSIS — M54.5 CHRONIC BILATERAL LOW BACK PAIN, WITH SCIATICA PRESENCE UNSPECIFIED: ICD-10-CM

## 2019-01-21 DIAGNOSIS — G89.29 CHRONIC BILATERAL LOW BACK PAIN, WITH SCIATICA PRESENCE UNSPECIFIED: ICD-10-CM

## 2019-01-21 DIAGNOSIS — M79.7 FIBROMYALGIA: ICD-10-CM

## 2019-02-08 ENCOUNTER — COMMUNICATION - HEALTHEAST (OUTPATIENT)
Dept: FAMILY MEDICINE | Facility: CLINIC | Age: 29
End: 2019-02-08

## 2019-02-08 DIAGNOSIS — E03.9 HYPOTHYROID: ICD-10-CM

## 2019-02-09 ENCOUNTER — COMMUNICATION - HEALTHEAST (OUTPATIENT)
Dept: FAMILY MEDICINE | Facility: CLINIC | Age: 29
End: 2019-02-09

## 2019-02-09 DIAGNOSIS — G89.29 CHRONIC BILATERAL LOW BACK PAIN, WITH SCIATICA PRESENCE UNSPECIFIED: ICD-10-CM

## 2019-02-09 DIAGNOSIS — M79.7 FIBROMYALGIA: ICD-10-CM

## 2019-02-09 DIAGNOSIS — M54.5 CHRONIC BILATERAL LOW BACK PAIN, WITH SCIATICA PRESENCE UNSPECIFIED: ICD-10-CM

## 2019-02-16 ENCOUNTER — COMMUNICATION - HEALTHEAST (OUTPATIENT)
Dept: INTERNAL MEDICINE | Facility: CLINIC | Age: 29
End: 2019-02-16

## 2019-03-25 ENCOUNTER — COMMUNICATION - HEALTHEAST (OUTPATIENT)
Dept: INTERNAL MEDICINE | Facility: CLINIC | Age: 29
End: 2019-03-25

## 2019-05-01 ENCOUNTER — RECORDS - HEALTHEAST (OUTPATIENT)
Dept: GENERAL RADIOLOGY | Facility: CLINIC | Age: 29
End: 2019-05-01

## 2019-05-01 DIAGNOSIS — M54.50 LOW BACK PAIN: ICD-10-CM

## 2019-05-01 DIAGNOSIS — G89.29 OTHER CHRONIC PAIN: ICD-10-CM

## 2019-05-06 ENCOUNTER — COMMUNICATION - HEALTHEAST (OUTPATIENT)
Dept: RHEUMATOLOGY | Facility: CLINIC | Age: 29
End: 2019-05-06

## 2019-05-06 DIAGNOSIS — G89.29 CHRONIC BILATERAL LOW BACK PAIN, WITH SCIATICA PRESENCE UNSPECIFIED: ICD-10-CM

## 2019-05-06 DIAGNOSIS — M79.7 FIBROMYALGIA: ICD-10-CM

## 2019-05-06 DIAGNOSIS — M54.5 CHRONIC BILATERAL LOW BACK PAIN, WITH SCIATICA PRESENCE UNSPECIFIED: ICD-10-CM

## 2019-05-09 ENCOUNTER — COMMUNICATION - HEALTHEAST (OUTPATIENT)
Dept: RHEUMATOLOGY | Facility: CLINIC | Age: 29
End: 2019-05-09

## 2019-05-14 ENCOUNTER — COMMUNICATION - HEALTHEAST (OUTPATIENT)
Dept: INTERNAL MEDICINE | Facility: CLINIC | Age: 29
End: 2019-05-14

## 2019-05-23 ENCOUNTER — COMMUNICATION - HEALTHEAST (OUTPATIENT)
Dept: INTERNAL MEDICINE | Facility: CLINIC | Age: 29
End: 2019-05-23

## 2019-05-26 ENCOUNTER — COMMUNICATION - HEALTHEAST (OUTPATIENT)
Dept: RHEUMATOLOGY | Facility: CLINIC | Age: 29
End: 2019-05-26

## 2019-05-28 ENCOUNTER — COMMUNICATION - HEALTHEAST (OUTPATIENT)
Dept: ADMINISTRATIVE | Facility: CLINIC | Age: 29
End: 2019-05-28

## 2019-05-30 ENCOUNTER — OFFICE VISIT - HEALTHEAST (OUTPATIENT)
Dept: RHEUMATOLOGY | Facility: CLINIC | Age: 29
End: 2019-05-30

## 2019-05-30 DIAGNOSIS — G89.29 CHRONIC PAIN OF BOTH KNEES: ICD-10-CM

## 2019-05-30 DIAGNOSIS — M25.561 CHRONIC PAIN OF BOTH KNEES: ICD-10-CM

## 2019-05-30 DIAGNOSIS — G89.29 CHRONIC BILATERAL LOW BACK PAIN, WITH SCIATICA PRESENCE UNSPECIFIED: ICD-10-CM

## 2019-05-30 DIAGNOSIS — M79.7 FIBROMYALGIA: ICD-10-CM

## 2019-05-30 DIAGNOSIS — M25.531 PAIN IN BOTH WRISTS: ICD-10-CM

## 2019-05-30 DIAGNOSIS — M54.5 CHRONIC BILATERAL LOW BACK PAIN, WITH SCIATICA PRESENCE UNSPECIFIED: ICD-10-CM

## 2019-05-30 DIAGNOSIS — M25.562 CHRONIC PAIN OF BOTH KNEES: ICD-10-CM

## 2019-05-30 DIAGNOSIS — M25.532 PAIN IN BOTH WRISTS: ICD-10-CM

## 2019-05-30 DIAGNOSIS — R76.8 POSITIVE ANTI-CCP TEST: ICD-10-CM

## 2019-05-31 ENCOUNTER — AMBULATORY - HEALTHEAST (OUTPATIENT)
Dept: LAB | Facility: CLINIC | Age: 29
End: 2019-05-31

## 2019-05-31 DIAGNOSIS — G89.29 CHRONIC PAIN OF BOTH KNEES: ICD-10-CM

## 2019-05-31 DIAGNOSIS — M25.532 PAIN IN BOTH WRISTS: ICD-10-CM

## 2019-05-31 DIAGNOSIS — M25.562 CHRONIC PAIN OF BOTH KNEES: ICD-10-CM

## 2019-05-31 DIAGNOSIS — M25.531 PAIN IN BOTH WRISTS: ICD-10-CM

## 2019-05-31 DIAGNOSIS — M79.7 FIBROMYALGIA: ICD-10-CM

## 2019-05-31 DIAGNOSIS — R76.8 POSITIVE ANTI-CCP TEST: ICD-10-CM

## 2019-05-31 DIAGNOSIS — M25.561 CHRONIC PAIN OF BOTH KNEES: ICD-10-CM

## 2019-05-31 DIAGNOSIS — M54.5 CHRONIC BILATERAL LOW BACK PAIN, WITH SCIATICA PRESENCE UNSPECIFIED: ICD-10-CM

## 2019-05-31 DIAGNOSIS — G89.29 CHRONIC BILATERAL LOW BACK PAIN, WITH SCIATICA PRESENCE UNSPECIFIED: ICD-10-CM

## 2019-05-31 LAB
ALT SERPL W P-5'-P-CCNC: 10 U/L (ref 0–45)
AST SERPL W P-5'-P-CCNC: 12 U/L (ref 0–40)
C REACTIVE PROTEIN LHE: 0.3 MG/DL (ref 0–0.8)
CK SERPL-CCNC: 44 U/L (ref 30–190)
CREAT SERPL-MCNC: 0.78 MG/DL (ref 0.6–1.1)
ERYTHROCYTE [SEDIMENTATION RATE] IN BLOOD BY WESTERGREN METHOD: 7 MM/HR (ref 0–20)
GFR SERPL CREATININE-BSD FRML MDRD: >60 ML/MIN/1.73M2

## 2019-06-02 ENCOUNTER — COMMUNICATION - HEALTHEAST (OUTPATIENT)
Dept: INTERNAL MEDICINE | Facility: CLINIC | Age: 29
End: 2019-06-02

## 2019-06-03 ENCOUNTER — COMMUNICATION - HEALTHEAST (OUTPATIENT)
Dept: INTERNAL MEDICINE | Facility: CLINIC | Age: 29
End: 2019-06-03

## 2019-06-25 ENCOUNTER — COMMUNICATION - HEALTHEAST (OUTPATIENT)
Dept: RHEUMATOLOGY | Facility: CLINIC | Age: 29
End: 2019-06-25

## 2019-06-28 ENCOUNTER — COMMUNICATION - HEALTHEAST (OUTPATIENT)
Dept: RHEUMATOLOGY | Facility: CLINIC | Age: 29
End: 2019-06-28

## 2019-06-28 ENCOUNTER — COMMUNICATION - HEALTHEAST (OUTPATIENT)
Dept: INTERNAL MEDICINE | Facility: CLINIC | Age: 29
End: 2019-06-28

## 2019-06-30 ENCOUNTER — COMMUNICATION - HEALTHEAST (OUTPATIENT)
Dept: INTERNAL MEDICINE | Facility: CLINIC | Age: 29
End: 2019-06-30

## 2019-07-01 ENCOUNTER — COMMUNICATION - HEALTHEAST (OUTPATIENT)
Dept: RHEUMATOLOGY | Facility: CLINIC | Age: 29
End: 2019-07-01

## 2019-07-29 ENCOUNTER — COMMUNICATION - HEALTHEAST (OUTPATIENT)
Dept: INTERNAL MEDICINE | Facility: CLINIC | Age: 29
End: 2019-07-29

## 2019-08-21 ENCOUNTER — COMMUNICATION - HEALTHEAST (OUTPATIENT)
Dept: INTERNAL MEDICINE | Facility: CLINIC | Age: 29
End: 2019-08-21

## 2019-08-30 ENCOUNTER — AMBULATORY - HEALTHEAST (OUTPATIENT)
Dept: LAB | Facility: CLINIC | Age: 29
End: 2019-08-30

## 2019-08-30 DIAGNOSIS — M25.562 CHRONIC PAIN OF BOTH KNEES: ICD-10-CM

## 2019-08-30 DIAGNOSIS — M25.532 PAIN IN BOTH WRISTS: ICD-10-CM

## 2019-08-30 DIAGNOSIS — M25.531 PAIN IN BOTH WRISTS: ICD-10-CM

## 2019-08-30 DIAGNOSIS — M25.561 CHRONIC PAIN OF BOTH KNEES: ICD-10-CM

## 2019-08-30 DIAGNOSIS — R76.8 POSITIVE ANTI-CCP TEST: ICD-10-CM

## 2019-08-30 DIAGNOSIS — G89.29 CHRONIC PAIN OF BOTH KNEES: ICD-10-CM

## 2019-08-30 LAB
ALT SERPL W P-5'-P-CCNC: 11 U/L (ref 0–45)
AST SERPL W P-5'-P-CCNC: 10 U/L (ref 0–40)
C REACTIVE PROTEIN LHE: 0.5 MG/DL (ref 0–0.8)
CREAT SERPL-MCNC: 0.76 MG/DL (ref 0.6–1.1)
ERYTHROCYTE [SEDIMENTATION RATE] IN BLOOD BY WESTERGREN METHOD: 12 MM/HR (ref 0–20)
GFR SERPL CREATININE-BSD FRML MDRD: >60 ML/MIN/1.73M2

## 2019-09-04 ENCOUNTER — COMMUNICATION - HEALTHEAST (OUTPATIENT)
Dept: INTERNAL MEDICINE | Facility: CLINIC | Age: 29
End: 2019-09-04

## 2019-09-24 ENCOUNTER — COMMUNICATION - HEALTHEAST (OUTPATIENT)
Dept: INTERNAL MEDICINE | Facility: CLINIC | Age: 29
End: 2019-09-24

## 2019-10-28 ENCOUNTER — COMMUNICATION - HEALTHEAST (OUTPATIENT)
Dept: FAMILY MEDICINE | Facility: CLINIC | Age: 29
End: 2019-10-28

## 2019-10-28 DIAGNOSIS — E03.9 HYPOTHYROID: ICD-10-CM

## 2019-11-06 ENCOUNTER — COMMUNICATION - HEALTHEAST (OUTPATIENT)
Dept: INTERNAL MEDICINE | Facility: CLINIC | Age: 29
End: 2019-11-06

## 2019-11-27 ENCOUNTER — COMMUNICATION - HEALTHEAST (OUTPATIENT)
Dept: RHEUMATOLOGY | Facility: CLINIC | Age: 29
End: 2019-11-27

## 2019-12-28 ENCOUNTER — COMMUNICATION - HEALTHEAST (OUTPATIENT)
Dept: FAMILY MEDICINE | Facility: CLINIC | Age: 29
End: 2019-12-28

## 2019-12-28 DIAGNOSIS — E03.9 HYPOTHYROID: ICD-10-CM

## 2019-12-30 ENCOUNTER — COMMUNICATION - HEALTHEAST (OUTPATIENT)
Dept: FAMILY MEDICINE | Facility: CLINIC | Age: 29
End: 2019-12-30

## 2019-12-30 DIAGNOSIS — E03.9 HYPOTHYROID: ICD-10-CM

## 2020-01-27 ENCOUNTER — COMMUNICATION - HEALTHEAST (OUTPATIENT)
Dept: FAMILY MEDICINE | Facility: CLINIC | Age: 30
End: 2020-01-27

## 2020-01-27 DIAGNOSIS — E03.9 HYPOTHYROID: ICD-10-CM

## 2020-01-31 ENCOUNTER — COMMUNICATION - HEALTHEAST (OUTPATIENT)
Dept: FAMILY MEDICINE | Facility: CLINIC | Age: 30
End: 2020-01-31

## 2020-01-31 DIAGNOSIS — E03.9 HYPOTHYROID: ICD-10-CM

## 2020-02-04 ENCOUNTER — COMMUNICATION - HEALTHEAST (OUTPATIENT)
Dept: RHEUMATOLOGY | Facility: CLINIC | Age: 30
End: 2020-02-04

## 2020-02-07 ENCOUNTER — COMMUNICATION - HEALTHEAST (OUTPATIENT)
Dept: INTERNAL MEDICINE | Facility: CLINIC | Age: 30
End: 2020-02-07

## 2020-02-10 ENCOUNTER — COMMUNICATION - HEALTHEAST (OUTPATIENT)
Dept: INTERNAL MEDICINE | Facility: CLINIC | Age: 30
End: 2020-02-10

## 2020-02-10 DIAGNOSIS — G89.29 CHRONIC BILATERAL LOW BACK PAIN: ICD-10-CM

## 2020-02-10 DIAGNOSIS — M54.50 CHRONIC BILATERAL LOW BACK PAIN: ICD-10-CM

## 2020-02-10 DIAGNOSIS — M79.7 FIBROMYALGIA: ICD-10-CM

## 2020-02-13 ENCOUNTER — OFFICE VISIT - HEALTHEAST (OUTPATIENT)
Dept: INTERNAL MEDICINE | Facility: CLINIC | Age: 30
End: 2020-02-13

## 2020-02-13 ENCOUNTER — COMMUNICATION - HEALTHEAST (OUTPATIENT)
Dept: INTERNAL MEDICINE | Facility: CLINIC | Age: 30
End: 2020-02-13

## 2020-02-13 DIAGNOSIS — Z00.00 ANNUAL PHYSICAL EXAM: ICD-10-CM

## 2020-02-13 DIAGNOSIS — J45.20 MILD INTERMITTENT ASTHMA WITHOUT COMPLICATION: ICD-10-CM

## 2020-02-13 DIAGNOSIS — E55.9 VITAMIN D DEFICIENCY: ICD-10-CM

## 2020-02-13 DIAGNOSIS — E03.9 HYPOTHYROIDISM, UNSPECIFIED TYPE: ICD-10-CM

## 2020-02-13 DIAGNOSIS — F33.1 MODERATE EPISODE OF RECURRENT MAJOR DEPRESSIVE DISORDER (H): ICD-10-CM

## 2020-02-13 DIAGNOSIS — R53.83 FATIGUE, UNSPECIFIED TYPE: ICD-10-CM

## 2020-02-13 LAB
ANION GAP SERPL CALCULATED.3IONS-SCNC: 11 MMOL/L (ref 5–18)
BASOPHILS # BLD AUTO: 0 THOU/UL (ref 0–0.2)
BASOPHILS NFR BLD AUTO: 0 % (ref 0–2)
BUN SERPL-MCNC: 10 MG/DL (ref 8–22)
CALCIUM SERPL-MCNC: 9.8 MG/DL (ref 8.5–10.5)
CHLORIDE BLD-SCNC: 107 MMOL/L (ref 98–107)
CO2 SERPL-SCNC: 22 MMOL/L (ref 22–31)
CREAT SERPL-MCNC: 0.82 MG/DL (ref 0.6–1.1)
EOSINOPHIL # BLD AUTO: 0.1 THOU/UL (ref 0–0.4)
EOSINOPHIL NFR BLD AUTO: 1 % (ref 0–6)
ERYTHROCYTE [DISTWIDTH] IN BLOOD BY AUTOMATED COUNT: 9.7 % (ref 11–14.5)
GFR SERPL CREATININE-BSD FRML MDRD: >60 ML/MIN/1.73M2
GLUCOSE BLD-MCNC: 82 MG/DL (ref 70–125)
HCT VFR BLD AUTO: 39.9 % (ref 35–47)
HGB BLD-MCNC: 13.3 G/DL (ref 12–16)
LYMPHOCYTES # BLD AUTO: 2.1 THOU/UL (ref 0.8–4.4)
LYMPHOCYTES NFR BLD AUTO: 31 % (ref 20–40)
MCH RBC QN AUTO: 31.4 PG (ref 27–34)
MCHC RBC AUTO-ENTMCNC: 33.3 G/DL (ref 32–36)
MCV RBC AUTO: 94 FL (ref 80–100)
MONOCYTES # BLD AUTO: 0.5 THOU/UL (ref 0–0.9)
MONOCYTES NFR BLD AUTO: 7 % (ref 2–10)
NEUTROPHILS # BLD AUTO: 4 THOU/UL (ref 2–7.7)
NEUTROPHILS NFR BLD AUTO: 60 % (ref 50–70)
PLATELET # BLD AUTO: 347 THOU/UL (ref 140–440)
PMV BLD AUTO: 7.6 FL (ref 7–10)
POTASSIUM BLD-SCNC: 4.1 MMOL/L (ref 3.5–5)
RBC # BLD AUTO: 4.24 MILL/UL (ref 3.8–5.4)
SODIUM SERPL-SCNC: 140 MMOL/L (ref 136–145)
TSH SERPL DL<=0.005 MIU/L-ACNC: 0.51 UIU/ML (ref 0.3–5)
WBC: 6.8 THOU/UL (ref 4–11)

## 2020-02-13 ASSESSMENT — PATIENT HEALTH QUESTIONNAIRE - PHQ9: SUM OF ALL RESPONSES TO PHQ QUESTIONS 1-9: 14

## 2020-02-13 ASSESSMENT — MIFFLIN-ST. JEOR: SCORE: 1319.63

## 2020-02-14 LAB
25(OH)D3 SERPL-MCNC: 58.7 NG/ML (ref 30–80)
25(OH)D3 SERPL-MCNC: 58.7 NG/ML (ref 30–80)

## 2020-02-17 ENCOUNTER — AMBULATORY - HEALTHEAST (OUTPATIENT)
Dept: LAB | Facility: CLINIC | Age: 30
End: 2020-02-17

## 2020-02-17 DIAGNOSIS — M25.532 PAIN IN BOTH WRISTS: ICD-10-CM

## 2020-02-17 DIAGNOSIS — G89.29 CHRONIC PAIN OF BOTH KNEES: ICD-10-CM

## 2020-02-17 DIAGNOSIS — M25.562 CHRONIC PAIN OF BOTH KNEES: ICD-10-CM

## 2020-02-17 DIAGNOSIS — M25.531 PAIN IN BOTH WRISTS: ICD-10-CM

## 2020-02-17 DIAGNOSIS — R76.8 POSITIVE ANTI-CCP TEST: ICD-10-CM

## 2020-02-17 DIAGNOSIS — M25.561 CHRONIC PAIN OF BOTH KNEES: ICD-10-CM

## 2020-02-17 DIAGNOSIS — E03.9 HYPOTHYROID: ICD-10-CM

## 2020-02-17 LAB
ALT SERPL W P-5'-P-CCNC: 16 U/L (ref 0–45)
AST SERPL W P-5'-P-CCNC: 15 U/L (ref 0–40)
C REACTIVE PROTEIN LHE: 0.4 MG/DL (ref 0–0.8)
CREAT SERPL-MCNC: 0.82 MG/DL (ref 0.6–1.1)
ERYTHROCYTE [SEDIMENTATION RATE] IN BLOOD BY WESTERGREN METHOD: 13 MM/HR (ref 0–20)
GFR SERPL CREATININE-BSD FRML MDRD: >60 ML/MIN/1.73M2
TSH SERPL DL<=0.005 MIU/L-ACNC: 0.64 UIU/ML (ref 0.3–5)

## 2020-02-18 ENCOUNTER — COMMUNICATION - HEALTHEAST (OUTPATIENT)
Dept: INTERNAL MEDICINE | Facility: CLINIC | Age: 30
End: 2020-02-18

## 2020-02-25 ENCOUNTER — COMMUNICATION - HEALTHEAST (OUTPATIENT)
Dept: INTERNAL MEDICINE | Facility: CLINIC | Age: 30
End: 2020-02-25

## 2020-02-26 ENCOUNTER — COMMUNICATION - HEALTHEAST (OUTPATIENT)
Dept: FAMILY MEDICINE | Facility: CLINIC | Age: 30
End: 2020-02-26

## 2020-02-26 DIAGNOSIS — E03.9 HYPOTHYROID: ICD-10-CM

## 2020-02-27 ENCOUNTER — COMMUNICATION - HEALTHEAST (OUTPATIENT)
Dept: INTERNAL MEDICINE | Facility: CLINIC | Age: 30
End: 2020-02-27

## 2020-02-28 ENCOUNTER — COMMUNICATION - HEALTHEAST (OUTPATIENT)
Dept: INTERNAL MEDICINE | Facility: CLINIC | Age: 30
End: 2020-02-28

## 2020-03-09 ENCOUNTER — COMMUNICATION - HEALTHEAST (OUTPATIENT)
Dept: RHEUMATOLOGY | Facility: CLINIC | Age: 30
End: 2020-03-09

## 2020-03-09 DIAGNOSIS — M54.50 CHRONIC BILATERAL LOW BACK PAIN: ICD-10-CM

## 2020-03-09 DIAGNOSIS — G89.29 CHRONIC BILATERAL LOW BACK PAIN: ICD-10-CM

## 2020-03-09 DIAGNOSIS — M79.7 FIBROMYALGIA: ICD-10-CM

## 2020-03-10 ENCOUNTER — HEALTH MAINTENANCE LETTER (OUTPATIENT)
Age: 30
End: 2020-03-10

## 2020-03-23 ENCOUNTER — COMMUNICATION - HEALTHEAST (OUTPATIENT)
Dept: INTERNAL MEDICINE | Facility: CLINIC | Age: 30
End: 2020-03-23

## 2020-03-25 ENCOUNTER — COMMUNICATION - HEALTHEAST (OUTPATIENT)
Dept: INTERNAL MEDICINE | Facility: CLINIC | Age: 30
End: 2020-03-25

## 2020-03-27 ENCOUNTER — COMMUNICATION - HEALTHEAST (OUTPATIENT)
Dept: INTERNAL MEDICINE | Facility: CLINIC | Age: 30
End: 2020-03-27

## 2020-04-02 ENCOUNTER — COMMUNICATION - HEALTHEAST (OUTPATIENT)
Dept: INTERNAL MEDICINE | Facility: CLINIC | Age: 30
End: 2020-04-02

## 2020-04-03 ENCOUNTER — RECORDS - HEALTHEAST (OUTPATIENT)
Dept: LAB | Facility: CLINIC | Age: 30
End: 2020-04-03

## 2020-04-03 LAB
HBV SURFACE AB SERPL IA-ACNC: POSITIVE M[IU]/ML
MEV IGG SER IA-ACNC: POSITIVE
MUV IGG SER QL IA: POSITIVE
RUBV IGG SERPL QL IA: POSITIVE
VZV IGG SER QL IA: POSITIVE

## 2020-04-06 ENCOUNTER — COMMUNICATION - HEALTHEAST (OUTPATIENT)
Dept: INTERNAL MEDICINE | Facility: CLINIC | Age: 30
End: 2020-04-06

## 2020-04-06 DIAGNOSIS — G89.29 CHRONIC BILATERAL LOW BACK PAIN: ICD-10-CM

## 2020-04-06 DIAGNOSIS — M79.7 FIBROMYALGIA: ICD-10-CM

## 2020-04-06 DIAGNOSIS — M54.50 CHRONIC BILATERAL LOW BACK PAIN: ICD-10-CM

## 2020-04-07 LAB
GAMMA INTERFERON BACKGROUND BLD IA-ACNC: 0.03 IU/ML
M TB IFN-G BLD-IMP: NEGATIVE
MITOGEN IGNF BCKGRD COR BLD-ACNC: 0 IU/ML
MITOGEN IGNF BCKGRD COR BLD-ACNC: 0.02 IU/ML
QTF INTERPRETATION: NORMAL
QTF MITOGEN - NIL: 7.73 IU/ML

## 2020-04-27 ENCOUNTER — OFFICE VISIT - HEALTHEAST (OUTPATIENT)
Dept: RHEUMATOLOGY | Facility: CLINIC | Age: 30
End: 2020-04-27

## 2020-04-27 ENCOUNTER — COMMUNICATION - HEALTHEAST (OUTPATIENT)
Dept: INTERNAL MEDICINE | Facility: CLINIC | Age: 30
End: 2020-04-27

## 2020-04-27 DIAGNOSIS — M79.7 FIBROMYALGIA: ICD-10-CM

## 2020-04-27 DIAGNOSIS — M54.50 CHRONIC BILATERAL LOW BACK PAIN: ICD-10-CM

## 2020-04-27 DIAGNOSIS — G89.29 CHRONIC BILATERAL LOW BACK PAIN: ICD-10-CM

## 2020-04-28 ENCOUNTER — COMMUNICATION - HEALTHEAST (OUTPATIENT)
Dept: INTERNAL MEDICINE | Facility: CLINIC | Age: 30
End: 2020-04-28

## 2020-04-28 ENCOUNTER — COMMUNICATION - HEALTHEAST (OUTPATIENT)
Dept: RHEUMATOLOGY | Facility: CLINIC | Age: 30
End: 2020-04-28

## 2020-04-28 DIAGNOSIS — M79.7 FIBROMYALGIA: ICD-10-CM

## 2020-04-30 ENCOUNTER — COMMUNICATION - HEALTHEAST (OUTPATIENT)
Dept: ADMINISTRATIVE | Facility: CLINIC | Age: 30
End: 2020-04-30

## 2020-06-26 ENCOUNTER — COMMUNICATION - HEALTHEAST (OUTPATIENT)
Dept: MIDWIFE SERVICES | Facility: CLINIC | Age: 30
End: 2020-06-26

## 2020-06-27 ENCOUNTER — COMMUNICATION - HEALTHEAST (OUTPATIENT)
Dept: INTERNAL MEDICINE | Facility: CLINIC | Age: 30
End: 2020-06-27

## 2020-06-27 DIAGNOSIS — M79.7 FIBROMYALGIA: ICD-10-CM

## 2020-06-30 ENCOUNTER — AMBULATORY - HEALTHEAST (OUTPATIENT)
Dept: RHEUMATOLOGY | Facility: CLINIC | Age: 30
End: 2020-06-30

## 2020-06-30 DIAGNOSIS — M79.7 FIBROMYALGIA: ICD-10-CM

## 2020-08-11 ENCOUNTER — THERAPY VISIT (OUTPATIENT)
Dept: CHIROPRACTIC MEDICINE | Facility: CLINIC | Age: 30
End: 2020-08-11
Payer: COMMERCIAL

## 2020-08-11 DIAGNOSIS — M99.01 SEGMENTAL DYSFUNCTION OF CERVICAL REGION: Primary | ICD-10-CM

## 2020-08-11 DIAGNOSIS — M99.04 SEGMENTAL DYSFUNCTION OF SACRAL REGION: ICD-10-CM

## 2020-08-11 DIAGNOSIS — M54.9 MECHANICAL BACK PAIN: ICD-10-CM

## 2020-08-11 DIAGNOSIS — M99.02 SEGMENTAL DYSFUNCTION OF THORACIC REGION: ICD-10-CM

## 2020-08-11 DIAGNOSIS — G44.209 TENSION HEADACHE: ICD-10-CM

## 2020-08-11 PROCEDURE — 99202 OFFICE O/P NEW SF 15 MIN: CPT | Mod: 25 | Performed by: CHIROPRACTOR

## 2020-08-11 PROCEDURE — 97035 APP MDLTY 1+ULTRASOUND EA 15: CPT | Performed by: CHIROPRACTOR

## 2020-08-11 PROCEDURE — 98941 CHIROPRACT MANJ 3-4 REGIONS: CPT | Mod: AT | Performed by: CHIROPRACTOR
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
## 2020-08-17 ENCOUNTER — COMMUNICATION - HEALTHEAST (OUTPATIENT)
Dept: INTERNAL MEDICINE | Facility: CLINIC | Age: 30
End: 2020-08-17

## 2020-08-19 ENCOUNTER — OFFICE VISIT - HEALTHEAST (OUTPATIENT)
Dept: MIDWIFE SERVICES | Facility: CLINIC | Age: 30
End: 2020-08-19

## 2020-08-19 DIAGNOSIS — Z12.4 CERVICAL CANCER SCREENING: ICD-10-CM

## 2020-08-19 ASSESSMENT — MIFFLIN-ST. JEOR: SCORE: 1314.64

## 2020-08-20 ENCOUNTER — OFFICE VISIT - HEALTHEAST (OUTPATIENT)
Dept: INTERNAL MEDICINE | Facility: CLINIC | Age: 30
End: 2020-08-20

## 2020-08-20 DIAGNOSIS — M79.7 FIBROMYALGIA: ICD-10-CM

## 2020-08-20 DIAGNOSIS — G44.209 TENSION HEADACHE: ICD-10-CM

## 2020-08-20 DIAGNOSIS — G47.00 INSOMNIA, UNSPECIFIED TYPE: ICD-10-CM

## 2020-08-20 ASSESSMENT — PATIENT HEALTH QUESTIONNAIRE - PHQ9: SUM OF ALL RESPONSES TO PHQ QUESTIONS 1-9: 8

## 2020-08-21 LAB
BKR LAB AP ABNORMAL BLEEDING: NO
BKR LAB AP BIRTH CONTROL/HORMONES: NORMAL
BKR LAB AP CERVICAL APPEARANCE: NORMAL
BKR LAB AP GYN ADEQUACY: NORMAL
BKR LAB AP GYN INTERPRETATION: NORMAL
BKR LAB AP HPV REFLEX: NORMAL
BKR LAB AP LMP: NORMAL
BKR LAB AP PATIENT STATUS: NORMAL
BKR LAB AP PREVIOUS ABNORMAL: NO
BKR LAB AP PREVIOUS NORMAL: 2015
HIGH RISK?: NO
PATH REPORT.COMMENTS IMP SPEC: NORMAL
RESULT FLAG (HE HISTORICAL CONVERSION): NORMAL

## 2020-08-24 ENCOUNTER — COMMUNICATION - HEALTHEAST (OUTPATIENT)
Dept: INTERNAL MEDICINE | Facility: CLINIC | Age: 30
End: 2020-08-24

## 2020-09-02 ENCOUNTER — COMMUNICATION - HEALTHEAST (OUTPATIENT)
Dept: INTERNAL MEDICINE | Facility: CLINIC | Age: 30
End: 2020-09-02

## 2020-09-08 ENCOUNTER — OFFICE VISIT - HEALTHEAST (OUTPATIENT)
Dept: RHEUMATOLOGY | Facility: CLINIC | Age: 30
End: 2020-09-08

## 2020-09-08 ENCOUNTER — COMMUNICATION - HEALTHEAST (OUTPATIENT)
Dept: RHEUMATOLOGY | Facility: CLINIC | Age: 30
End: 2020-09-08

## 2020-09-08 DIAGNOSIS — M54.6 CHRONIC MIDLINE THORACIC BACK PAIN: ICD-10-CM

## 2020-09-08 DIAGNOSIS — G89.29 CHRONIC NECK PAIN: ICD-10-CM

## 2020-09-08 DIAGNOSIS — M54.2 CHRONIC NECK PAIN: ICD-10-CM

## 2020-09-08 DIAGNOSIS — M54.50 CHRONIC BILATERAL LOW BACK PAIN, UNSPECIFIED WHETHER SCIATICA PRESENT: ICD-10-CM

## 2020-09-08 DIAGNOSIS — G89.29 CHRONIC BILATERAL LOW BACK PAIN, UNSPECIFIED WHETHER SCIATICA PRESENT: ICD-10-CM

## 2020-09-08 DIAGNOSIS — R76.8 POSITIVE ANTI-CCP TEST: ICD-10-CM

## 2020-09-08 DIAGNOSIS — M79.7 FIBROMYALGIA: ICD-10-CM

## 2020-09-08 DIAGNOSIS — G89.29 CHRONIC MIDLINE THORACIC BACK PAIN: ICD-10-CM

## 2020-09-09 ENCOUNTER — COMMUNICATION - HEALTHEAST (OUTPATIENT)
Dept: PHYSICAL MEDICINE AND REHAB | Facility: CLINIC | Age: 30
End: 2020-09-09

## 2020-09-14 ENCOUNTER — COMMUNICATION - HEALTHEAST (OUTPATIENT)
Dept: RHEUMATOLOGY | Facility: CLINIC | Age: 30
End: 2020-09-14

## 2020-09-14 ENCOUNTER — COMMUNICATION - HEALTHEAST (OUTPATIENT)
Dept: INTERNAL MEDICINE | Facility: CLINIC | Age: 30
End: 2020-09-14

## 2020-09-14 DIAGNOSIS — M79.7 FIBROMYALGIA: ICD-10-CM

## 2020-09-24 ENCOUNTER — COMMUNICATION - HEALTHEAST (OUTPATIENT)
Dept: INTERNAL MEDICINE | Facility: CLINIC | Age: 30
End: 2020-09-24

## 2020-09-24 DIAGNOSIS — M79.7 FIBROMYALGIA: ICD-10-CM

## 2020-10-02 ENCOUNTER — COMMUNICATION - HEALTHEAST (OUTPATIENT)
Dept: INTERNAL MEDICINE | Facility: CLINIC | Age: 30
End: 2020-10-02

## 2020-10-05 ENCOUNTER — COMMUNICATION - HEALTHEAST (OUTPATIENT)
Dept: INTERNAL MEDICINE | Facility: CLINIC | Age: 30
End: 2020-10-05

## 2020-10-05 DIAGNOSIS — M79.7 FIBROMYALGIA: ICD-10-CM

## 2020-10-08 ENCOUNTER — OFFICE VISIT - HEALTHEAST (OUTPATIENT)
Dept: INTERNAL MEDICINE | Facility: CLINIC | Age: 30
End: 2020-10-08

## 2020-10-08 DIAGNOSIS — G47.00 INSOMNIA, UNSPECIFIED TYPE: ICD-10-CM

## 2020-10-08 DIAGNOSIS — J45.20 MILD INTERMITTENT ASTHMA WITHOUT COMPLICATION: ICD-10-CM

## 2020-10-08 DIAGNOSIS — K92.1 BLOOD IN STOOL: ICD-10-CM

## 2020-10-08 DIAGNOSIS — Z23 NEED FOR VACCINATION: ICD-10-CM

## 2020-10-12 ENCOUNTER — HOSPITAL ENCOUNTER (OUTPATIENT)
Dept: PHYSICAL MEDICINE AND REHAB | Facility: CLINIC | Age: 30
Discharge: HOME | End: 2020-10-12
Attending: INTERNAL MEDICINE

## 2020-10-12 DIAGNOSIS — M54.50 CHRONIC BILATERAL LOW BACK PAIN WITHOUT SCIATICA: ICD-10-CM

## 2020-10-12 DIAGNOSIS — M54.2 CHRONIC NECK PAIN: ICD-10-CM

## 2020-10-12 DIAGNOSIS — G89.29 CHRONIC NECK PAIN: ICD-10-CM

## 2020-10-12 DIAGNOSIS — G89.29 CHRONIC BILATERAL LOW BACK PAIN WITHOUT SCIATICA: ICD-10-CM

## 2020-10-12 ASSESSMENT — MIFFLIN-ST. JEOR: SCORE: 1310.56

## 2020-10-14 ENCOUNTER — COMMUNICATION - HEALTHEAST (OUTPATIENT)
Dept: INTERNAL MEDICINE | Facility: CLINIC | Age: 30
End: 2020-10-14

## 2020-10-14 DIAGNOSIS — M79.7 FIBROMYALGIA: ICD-10-CM

## 2020-10-21 DIAGNOSIS — M79.7 FIBROMYALGIA: Primary | ICD-10-CM

## 2020-10-23 DIAGNOSIS — M79.7 FIBROMYALGIA: ICD-10-CM

## 2020-10-23 PROCEDURE — 36415 COLL VENOUS BLD VENIPUNCTURE: CPT | Performed by: INTERNAL MEDICINE

## 2020-10-23 PROCEDURE — 82565 ASSAY OF CREATININE: CPT | Performed by: INTERNAL MEDICINE

## 2020-10-23 PROCEDURE — 84450 TRANSFERASE (AST) (SGOT): CPT | Performed by: INTERNAL MEDICINE

## 2020-10-23 PROCEDURE — 84460 ALANINE AMINO (ALT) (SGPT): CPT | Performed by: INTERNAL MEDICINE

## 2020-10-24 LAB
ALT SERPL W P-5'-P-CCNC: 18 U/L (ref 0–50)
AST SERPL W P-5'-P-CCNC: 13 U/L (ref 0–45)
CREAT SERPL-MCNC: 0.65 MG/DL (ref 0.52–1.04)
GFR SERPL CREATININE-BSD FRML MDRD: >90 ML/MIN/{1.73_M2}

## 2020-10-27 ENCOUNTER — COMMUNICATION - HEALTHEAST (OUTPATIENT)
Dept: INTERNAL MEDICINE | Facility: CLINIC | Age: 30
End: 2020-10-27

## 2020-10-27 DIAGNOSIS — R20.2 PARESTHESIA: ICD-10-CM

## 2020-10-27 DIAGNOSIS — R20.2 PARESTHESIA: Primary | ICD-10-CM

## 2020-10-30 ENCOUNTER — COMMUNICATION - HEALTHEAST (OUTPATIENT)
Dept: RHEUMATOLOGY | Facility: CLINIC | Age: 30
End: 2020-10-30

## 2020-10-30 ENCOUNTER — HOSPITAL ENCOUNTER (OUTPATIENT)
Dept: MRI IMAGING | Facility: CLINIC | Age: 30
Discharge: HOME | End: 2020-10-30
Attending: PHYSICIAN ASSISTANT

## 2020-10-30 DIAGNOSIS — G89.29 CHRONIC NECK PAIN: ICD-10-CM

## 2020-10-30 DIAGNOSIS — M54.50 CHRONIC BILATERAL LOW BACK PAIN WITHOUT SCIATICA: ICD-10-CM

## 2020-10-30 DIAGNOSIS — G89.29 CHRONIC BILATERAL LOW BACK PAIN WITHOUT SCIATICA: ICD-10-CM

## 2020-10-30 DIAGNOSIS — M54.2 CHRONIC NECK PAIN: ICD-10-CM

## 2020-11-01 ENCOUNTER — COMMUNICATION - HEALTHEAST (OUTPATIENT)
Dept: INTERNAL MEDICINE | Facility: CLINIC | Age: 30
End: 2020-11-01

## 2020-11-01 DIAGNOSIS — M79.7 FIBROMYALGIA: ICD-10-CM

## 2020-11-02 ENCOUNTER — COMMUNICATION - HEALTHEAST (OUTPATIENT)
Dept: PHYSICAL MEDICINE AND REHAB | Facility: CLINIC | Age: 30
End: 2020-11-02

## 2020-11-04 DIAGNOSIS — R20.2 PARESTHESIA: ICD-10-CM

## 2020-11-04 LAB
TRANSFERRIN SERPL-MCNC: 380 MG/DL (ref 210–360)
VIT B12 SERPL-MCNC: 320 PG/ML (ref 193–986)

## 2020-11-04 PROCEDURE — 82607 VITAMIN B-12: CPT | Performed by: INTERNAL MEDICINE

## 2020-11-04 PROCEDURE — 83540 ASSAY OF IRON: CPT | Performed by: INTERNAL MEDICINE

## 2020-11-04 PROCEDURE — 82728 ASSAY OF FERRITIN: CPT | Performed by: INTERNAL MEDICINE

## 2020-11-04 PROCEDURE — 84466 ASSAY OF TRANSFERRIN: CPT | Performed by: INTERNAL MEDICINE

## 2020-11-04 PROCEDURE — 83550 IRON BINDING TEST: CPT | Performed by: INTERNAL MEDICINE

## 2020-11-05 ENCOUNTER — RECORDS - HEALTHEAST (OUTPATIENT)
Dept: ADMINISTRATIVE | Facility: OTHER | Age: 30
End: 2020-11-05

## 2020-11-05 LAB
FERRITIN SERPL-MCNC: 26 NG/ML (ref 12–150)
IRON SATN MFR SERPL: 16 % (ref 15–46)
IRON SERPL-MCNC: 87 UG/DL (ref 35–180)
TIBC SERPL-MCNC: 537 UG/DL (ref 240–430)

## 2020-11-09 ENCOUNTER — COMMUNICATION - HEALTHEAST (OUTPATIENT)
Dept: INTERNAL MEDICINE | Facility: CLINIC | Age: 30
End: 2020-11-09

## 2020-11-13 ENCOUNTER — COMMUNICATION - HEALTHEAST (OUTPATIENT)
Dept: RHEUMATOLOGY | Facility: CLINIC | Age: 30
End: 2020-11-13

## 2020-11-16 ENCOUNTER — COMMUNICATION - HEALTHEAST (OUTPATIENT)
Dept: INTERNAL MEDICINE | Facility: CLINIC | Age: 30
End: 2020-11-16

## 2020-11-28 ENCOUNTER — COMMUNICATION - HEALTHEAST (OUTPATIENT)
Dept: INTERNAL MEDICINE | Facility: CLINIC | Age: 30
End: 2020-11-28

## 2020-11-28 DIAGNOSIS — Z78.9 USES BIRTH CONTROL: ICD-10-CM

## 2020-12-02 ENCOUNTER — COMMUNICATION - HEALTHEAST (OUTPATIENT)
Dept: INTERNAL MEDICINE | Facility: CLINIC | Age: 30
End: 2020-12-02

## 2020-12-02 DIAGNOSIS — E03.9 HYPOTHYROID: ICD-10-CM

## 2020-12-16 ENCOUNTER — COMMUNICATION - HEALTHEAST (OUTPATIENT)
Dept: INTERNAL MEDICINE | Facility: CLINIC | Age: 30
End: 2020-12-16

## 2020-12-21 ENCOUNTER — COMMUNICATION - HEALTHEAST (OUTPATIENT)
Dept: RHEUMATOLOGY | Facility: CLINIC | Age: 30
End: 2020-12-21

## 2021-01-04 ENCOUNTER — COMMUNICATION - HEALTHEAST (OUTPATIENT)
Dept: INTERNAL MEDICINE | Facility: CLINIC | Age: 31
End: 2021-01-04

## 2021-01-04 DIAGNOSIS — M79.7 FIBROMYALGIA: ICD-10-CM

## 2021-01-27 ENCOUNTER — COMMUNICATION - HEALTHEAST (OUTPATIENT)
Dept: MIDWIFE SERVICES | Facility: CLINIC | Age: 31
End: 2021-01-27

## 2021-01-29 ENCOUNTER — COMMUNICATION - HEALTHEAST (OUTPATIENT)
Dept: RHEUMATOLOGY | Facility: CLINIC | Age: 31
End: 2021-01-29

## 2021-02-03 ENCOUNTER — COMMUNICATION - HEALTHEAST (OUTPATIENT)
Dept: INTERNAL MEDICINE | Facility: CLINIC | Age: 31
End: 2021-02-03

## 2021-02-08 ENCOUNTER — COMMUNICATION - HEALTHEAST (OUTPATIENT)
Dept: INTERNAL MEDICINE | Facility: CLINIC | Age: 31
End: 2021-02-08

## 2021-02-09 ENCOUNTER — COMMUNICATION - HEALTHEAST (OUTPATIENT)
Dept: ADMINISTRATIVE | Facility: CLINIC | Age: 31
End: 2021-02-09

## 2021-02-16 ENCOUNTER — COMMUNICATION - HEALTHEAST (OUTPATIENT)
Dept: INTERNAL MEDICINE | Facility: CLINIC | Age: 31
End: 2021-02-16

## 2021-02-17 ENCOUNTER — COMMUNICATION - HEALTHEAST (OUTPATIENT)
Dept: INTERNAL MEDICINE | Facility: CLINIC | Age: 31
End: 2021-02-17

## 2021-02-18 ENCOUNTER — OFFICE VISIT - HEALTHEAST (OUTPATIENT)
Dept: INTERNAL MEDICINE | Facility: CLINIC | Age: 31
End: 2021-02-18

## 2021-02-18 ENCOUNTER — COMMUNICATION - HEALTHEAST (OUTPATIENT)
Dept: INTERNAL MEDICINE | Facility: CLINIC | Age: 31
End: 2021-02-18

## 2021-02-18 DIAGNOSIS — F33.1 MODERATE EPISODE OF RECURRENT MAJOR DEPRESSIVE DISORDER (H): ICD-10-CM

## 2021-02-18 DIAGNOSIS — E03.9 HYPOTHYROIDISM, UNSPECIFIED TYPE: ICD-10-CM

## 2021-02-18 ASSESSMENT — ANXIETY QUESTIONNAIRES
IF YOU CHECKED OFF ANY PROBLEMS ON THIS QUESTIONNAIRE, HOW DIFFICULT HAVE THESE PROBLEMS MADE IT FOR YOU TO DO YOUR WORK, TAKE CARE OF THINGS AT HOME, OR GET ALONG WITH OTHER PEOPLE: SOMEWHAT DIFFICULT
1. FEELING NERVOUS, ANXIOUS, OR ON EDGE: NEARLY EVERY DAY
6. BECOMING EASILY ANNOYED OR IRRITABLE: NOT AT ALL
7. FEELING AFRAID AS IF SOMETHING AWFUL MIGHT HAPPEN: NOT AT ALL
2. NOT BEING ABLE TO STOP OR CONTROL WORRYING: NEARLY EVERY DAY
5. BEING SO RESTLESS THAT IT IS HARD TO SIT STILL: SEVERAL DAYS
GAD7 TOTAL SCORE: 13
3. WORRYING TOO MUCH ABOUT DIFFERENT THINGS: NEARLY EVERY DAY
4. TROUBLE RELAXING: NEARLY EVERY DAY

## 2021-02-18 ASSESSMENT — PATIENT HEALTH QUESTIONNAIRE - PHQ9: SUM OF ALL RESPONSES TO PHQ QUESTIONS 1-9: 12

## 2021-03-01 ENCOUNTER — MEDICAL CORRESPONDENCE (OUTPATIENT)
Dept: HEALTH INFORMATION MANAGEMENT | Facility: CLINIC | Age: 31
End: 2021-03-01

## 2021-03-01 ENCOUNTER — OFFICE VISIT - HEALTHEAST (OUTPATIENT)
Dept: INTERNAL MEDICINE | Facility: CLINIC | Age: 31
End: 2021-03-01

## 2021-03-01 DIAGNOSIS — F33.41 RECURRENT MAJOR DEPRESSIVE DISORDER, IN PARTIAL REMISSION (H): ICD-10-CM

## 2021-03-01 DIAGNOSIS — Z13.220 LIPID SCREENING: ICD-10-CM

## 2021-03-01 DIAGNOSIS — R63.1 POLYDIPSIA: ICD-10-CM

## 2021-03-01 DIAGNOSIS — E03.9 ACQUIRED HYPOTHYROIDISM: ICD-10-CM

## 2021-03-01 ASSESSMENT — PATIENT HEALTH QUESTIONNAIRE - PHQ9: SUM OF ALL RESPONSES TO PHQ QUESTIONS 1-9: 12

## 2021-03-02 ENCOUNTER — COMMUNICATION - HEALTHEAST (OUTPATIENT)
Dept: INTERNAL MEDICINE | Facility: CLINIC | Age: 31
End: 2021-03-02

## 2021-03-03 DIAGNOSIS — R63.1 POLYDIPSIA: Primary | ICD-10-CM

## 2021-03-03 DIAGNOSIS — E03.9 ACQUIRED HYPOTHYROIDISM: ICD-10-CM

## 2021-03-03 DIAGNOSIS — Z13.220 LIPID SCREENING: ICD-10-CM

## 2021-03-07 ENCOUNTER — COMMUNICATION - HEALTHEAST (OUTPATIENT)
Dept: INTERNAL MEDICINE | Facility: CLINIC | Age: 31
End: 2021-03-07

## 2021-03-07 DIAGNOSIS — M79.7 FIBROMYALGIA: ICD-10-CM

## 2021-03-09 ENCOUNTER — VIRTUAL VISIT (OUTPATIENT)
Dept: PSYCHOLOGY | Facility: CLINIC | Age: 31
End: 2021-03-09
Payer: COMMERCIAL

## 2021-03-09 DIAGNOSIS — F41.9 ANXIETY DISORDER, UNSPECIFIED TYPE: Primary | ICD-10-CM

## 2021-03-09 PROCEDURE — 90834 PSYTX W PT 45 MINUTES: CPT | Mod: GT | Performed by: SOCIAL WORKER

## 2021-03-09 ASSESSMENT — COLUMBIA-SUICIDE SEVERITY RATING SCALE - C-SSRS
3. HAVE YOU BEEN THINKING ABOUT HOW YOU MIGHT KILL YOURSELF?: YES
4. HAVE YOU HAD THESE THOUGHTS AND HAD SOME INTENTION OF ACTING ON THEM?: YES
ATTEMPT LIFETIME: YES
5. HAVE YOU STARTED TO WORK OUT OR WORKED OUT THE DETAILS OF HOW TO KILL YOURSELF? DO YOU INTEND TO CARRY OUT THIS PLAN?: YES
1. IN THE PAST MONTH, HAVE YOU WISHED YOU WERE DEAD OR WISHED YOU COULD GO TO SLEEP AND NOT WAKE UP?: NO
1. IN THE PAST MONTH, HAVE YOU WISHED YOU WERE DEAD OR WISHED YOU COULD GO TO SLEEP AND NOT WAKE UP?: YES
2. HAVE YOU ACTUALLY HAD ANY THOUGHTS OF KILLING YOURSELF?: NO
4. HAVE YOU HAD THESE THOUGHTS AND HAD SOME INTENTION OF ACTING ON THEM?: NO
5. HAVE YOU STARTED TO WORK OUT OR WORKED OUT THE DETAILS OF HOW TO KILL YOURSELF? DO YOU INTEND TO CARRY OUT THIS PLAN?: NO
TOTAL  NUMBER OF ACTUAL ATTEMPTS LIFETIME: 4
2. HAVE YOU ACTUALLY HAD ANY THOUGHTS OF KILLING YOURSELF LIFETIME?: YES
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
## 2021-04-05 ASSESSMENT — PATIENT HEALTH QUESTIONNAIRE - PHQ9
SUM OF ALL RESPONSES TO PHQ QUESTIONS 1-9: 7
SUM OF ALL RESPONSES TO PHQ QUESTIONS 1-9: 7
10. IF YOU CHECKED OFF ANY PROBLEMS, HOW DIFFICULT HAVE THESE PROBLEMS MADE IT FOR YOU TO DO YOUR WORK, TAKE CARE OF THINGS AT HOME, OR GET ALONG WITH OTHER PEOPLE: SOMEWHAT DIFFICULT

## 2021-04-05 ASSESSMENT — ANXIETY QUESTIONNAIRES
2. NOT BEING ABLE TO STOP OR CONTROL WORRYING: SEVERAL DAYS
5. BEING SO RESTLESS THAT IT IS HARD TO SIT STILL: SEVERAL DAYS
1. FEELING NERVOUS, ANXIOUS, OR ON EDGE: MORE THAN HALF THE DAYS
3. WORRYING TOO MUCH ABOUT DIFFERENT THINGS: SEVERAL DAYS
7. FEELING AFRAID AS IF SOMETHING AWFUL MIGHT HAPPEN: SEVERAL DAYS
6. BECOMING EASILY ANNOYED OR IRRITABLE: SEVERAL DAYS
GAD7 TOTAL SCORE: 8
4. TROUBLE RELAXING: SEVERAL DAYS
GAD7 TOTAL SCORE: 8
GAD7 TOTAL SCORE: 8
7. FEELING AFRAID AS IF SOMETHING AWFUL MIGHT HAPPEN: SEVERAL DAYS

## 2021-04-06 ASSESSMENT — PATIENT HEALTH QUESTIONNAIRE - PHQ9: SUM OF ALL RESPONSES TO PHQ QUESTIONS 1-9: 7

## 2021-04-06 ASSESSMENT — ANXIETY QUESTIONNAIRES: GAD7 TOTAL SCORE: 8

## 2021-04-07 ENCOUNTER — VIRTUAL VISIT (OUTPATIENT)
Dept: PSYCHOLOGY | Facility: CLINIC | Age: 31
End: 2021-04-07
Payer: COMMERCIAL

## 2021-04-07 DIAGNOSIS — F50.9 EATING DISORDER, UNSPECIFIED TYPE: ICD-10-CM

## 2021-04-07 DIAGNOSIS — F41.9 ANXIETY DISORDER, UNSPECIFIED TYPE: Primary | ICD-10-CM

## 2021-04-07 DIAGNOSIS — F84.0 AUTISM SPECTRUM DISORDER: ICD-10-CM

## 2021-04-07 PROCEDURE — 90791 PSYCH DIAGNOSTIC EVALUATION: CPT | Mod: 95 | Performed by: SOCIAL WORKER
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
## 2021-04-07 NOTE — Clinical Note
Hello - I recently started completed a DA for your patient and plans to continue therapy.  Let me know if you would like to coordinate care in the future.    Thanks,  PATRICIA Tucker
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
## 2021-04-09 ENCOUNTER — RECORDS - HEALTHEAST (OUTPATIENT)
Dept: ADMINISTRATIVE | Facility: OTHER | Age: 31
End: 2021-04-09

## 2021-04-09 DIAGNOSIS — E03.9 ACQUIRED HYPOTHYROIDISM: ICD-10-CM

## 2021-04-09 DIAGNOSIS — Z13.220 LIPID SCREENING: ICD-10-CM

## 2021-04-09 DIAGNOSIS — R63.1 POLYDIPSIA: ICD-10-CM

## 2021-04-09 LAB
ANION GAP SERPL CALCULATED.3IONS-SCNC: 5 MMOL/L (ref 3–14)
BUN SERPL-MCNC: 11 MG/DL (ref 7–30)
CALCIUM SERPL-MCNC: 9.2 MG/DL (ref 8.5–10.1)
CHLORIDE SERPL-SCNC: 107 MMOL/L (ref 94–109)
CHOLEST SERPL-MCNC: 162 MG/DL
CO2 SERPL-SCNC: 25 MMOL/L (ref 20–32)
CREAT SERPL-MCNC: 0.85 MG/DL (ref 0.52–1.04)
GFR SERPL CREATININE-BSD FRML MDRD: >90 ML/MIN/{1.73_M2}
GLUCOSE SERPL-MCNC: 77 MG/DL (ref 70–99)
HDLC SERPL-MCNC: 57 MG/DL
LDLC SERPL CALC-MCNC: 81 MG/DL
NONHDLC SERPL-MCNC: 105 MG/DL
OSMOLALITY SERPL: 285 MMOL/KG (ref 275–295)
OSMOLALITY UR: 488 MMOL/KG (ref 100–1200)
POTASSIUM SERPL-SCNC: 4.2 MMOL/L (ref 3.4–5.3)
SODIUM SERPL-SCNC: 137 MMOL/L (ref 133–144)
SODIUM UR-SCNC: 108 MMOL/L
TRIGL SERPL-MCNC: 120 MG/DL
TSH SERPL DL<=0.005 MIU/L-ACNC: 1.19 MU/L (ref 0.4–4)

## 2021-04-09 PROCEDURE — 80061 LIPID PANEL: CPT | Performed by: INTERNAL MEDICINE

## 2021-04-09 PROCEDURE — 80048 BASIC METABOLIC PNL TOTAL CA: CPT | Performed by: INTERNAL MEDICINE

## 2021-04-09 PROCEDURE — 83935 ASSAY OF URINE OSMOLALITY: CPT | Mod: 90 | Performed by: INTERNAL MEDICINE

## 2021-04-09 PROCEDURE — 36415 COLL VENOUS BLD VENIPUNCTURE: CPT | Performed by: INTERNAL MEDICINE

## 2021-04-09 PROCEDURE — 99000 SPECIMEN HANDLING OFFICE-LAB: CPT | Performed by: INTERNAL MEDICINE

## 2021-04-09 PROCEDURE — 84443 ASSAY THYROID STIM HORMONE: CPT | Performed by: INTERNAL MEDICINE

## 2021-04-09 PROCEDURE — 84300 ASSAY OF URINE SODIUM: CPT | Performed by: INTERNAL MEDICINE

## 2021-04-09 PROCEDURE — 83930 ASSAY OF BLOOD OSMOLALITY: CPT | Performed by: INTERNAL MEDICINE

## 2021-04-12 ENCOUNTER — RECORDS - HEALTHEAST (OUTPATIENT)
Dept: ADMINISTRATIVE | Facility: OTHER | Age: 31
End: 2021-04-12

## 2021-04-15 ENCOUNTER — COMMUNICATION - HEALTHEAST (OUTPATIENT)
Dept: MIDWIFE SERVICES | Facility: CLINIC | Age: 31
End: 2021-04-15

## 2021-04-15 ASSESSMENT — PATIENT HEALTH QUESTIONNAIRE - PHQ9
SUM OF ALL RESPONSES TO PHQ QUESTIONS 1-9: 5
SUM OF ALL RESPONSES TO PHQ QUESTIONS 1-9: 5
10. IF YOU CHECKED OFF ANY PROBLEMS, HOW DIFFICULT HAVE THESE PROBLEMS MADE IT FOR YOU TO DO YOUR WORK, TAKE CARE OF THINGS AT HOME, OR GET ALONG WITH OTHER PEOPLE: SOMEWHAT DIFFICULT

## 2021-04-16 ENCOUNTER — VIRTUAL VISIT (OUTPATIENT)
Dept: PSYCHOLOGY | Facility: CLINIC | Age: 31
End: 2021-04-16
Payer: COMMERCIAL

## 2021-04-16 DIAGNOSIS — F84.0 AUTISM SPECTRUM DISORDER: ICD-10-CM

## 2021-04-16 DIAGNOSIS — F41.9 ANXIETY DISORDER, UNSPECIFIED TYPE: Primary | ICD-10-CM

## 2021-04-16 DIAGNOSIS — F50.9 EATING DISORDER, UNSPECIFIED TYPE: ICD-10-CM

## 2021-04-16 PROCEDURE — 90834 PSYTX W PT 45 MINUTES: CPT | Mod: 95 | Performed by: SOCIAL WORKER

## 2021-04-16 ASSESSMENT — PATIENT HEALTH QUESTIONNAIRE - PHQ9: SUM OF ALL RESPONSES TO PHQ QUESTIONS 1-9: 5
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
## 2021-04-24 ENCOUNTER — HEALTH MAINTENANCE LETTER (OUTPATIENT)
Age: 31
End: 2021-04-24

## 2021-04-26 ENCOUNTER — COMMUNICATION - HEALTHEAST (OUTPATIENT)
Dept: INTERNAL MEDICINE | Facility: CLINIC | Age: 31
End: 2021-04-26

## 2021-04-26 DIAGNOSIS — Z78.9 USES BIRTH CONTROL: ICD-10-CM

## 2021-04-28 ENCOUNTER — VIRTUAL VISIT (OUTPATIENT)
Dept: PSYCHOLOGY | Facility: CLINIC | Age: 31
End: 2021-04-28
Payer: COMMERCIAL

## 2021-04-28 DIAGNOSIS — F50.9 EATING DISORDER, UNSPECIFIED TYPE: ICD-10-CM

## 2021-04-28 DIAGNOSIS — F84.0 AUTISM SPECTRUM DISORDER: ICD-10-CM

## 2021-04-28 DIAGNOSIS — F41.9 ANXIETY DISORDER, UNSPECIFIED TYPE: Primary | ICD-10-CM

## 2021-04-28 PROCEDURE — 90834 PSYTX W PT 45 MINUTES: CPT | Mod: GT | Performed by: SOCIAL WORKER

## 2021-04-28 ASSESSMENT — ANXIETY QUESTIONNAIRES
2. NOT BEING ABLE TO STOP OR CONTROL WORRYING: SEVERAL DAYS
1. FEELING NERVOUS, ANXIOUS, OR ON EDGE: SEVERAL DAYS
4. TROUBLE RELAXING: SEVERAL DAYS
5. BEING SO RESTLESS THAT IT IS HARD TO SIT STILL: NOT AT ALL
GAD7 TOTAL SCORE: 5
3. WORRYING TOO MUCH ABOUT DIFFERENT THINGS: SEVERAL DAYS
7. FEELING AFRAID AS IF SOMETHING AWFUL MIGHT HAPPEN: SEVERAL DAYS
6. BECOMING EASILY ANNOYED OR IRRITABLE: NOT AT ALL
GAD7 TOTAL SCORE: 5
7. FEELING AFRAID AS IF SOMETHING AWFUL MIGHT HAPPEN: SEVERAL DAYS

## 2021-04-29 ASSESSMENT — PATIENT HEALTH QUESTIONNAIRE - PHQ9: SUM OF ALL RESPONSES TO PHQ QUESTIONS 1-9: 5

## 2021-04-29 ASSESSMENT — ANXIETY QUESTIONNAIRES: GAD7 TOTAL SCORE: 5

## 2021-05-03 ENCOUNTER — OFFICE VISIT - HEALTHEAST (OUTPATIENT)
Dept: RHEUMATOLOGY | Facility: CLINIC | Age: 31
End: 2021-05-03

## 2021-05-03 DIAGNOSIS — G89.29 CHRONIC MIDLINE THORACIC BACK PAIN: ICD-10-CM

## 2021-05-03 DIAGNOSIS — R76.8 POSITIVE ANTI-CCP TEST: ICD-10-CM

## 2021-05-03 DIAGNOSIS — M54.6 CHRONIC MIDLINE THORACIC BACK PAIN: ICD-10-CM

## 2021-05-03 DIAGNOSIS — M79.7 FIBROMYALGIA: ICD-10-CM

## 2021-05-03 DIAGNOSIS — M54.50 CHRONIC BILATERAL LOW BACK PAIN, UNSPECIFIED WHETHER SCIATICA PRESENT: ICD-10-CM

## 2021-05-03 DIAGNOSIS — G89.29 CHRONIC BILATERAL LOW BACK PAIN, UNSPECIFIED WHETHER SCIATICA PRESENT: ICD-10-CM

## 2021-05-03 DIAGNOSIS — M54.2 CHRONIC NECK PAIN: ICD-10-CM

## 2021-05-03 DIAGNOSIS — G89.29 CHRONIC NECK PAIN: ICD-10-CM

## 2021-05-04 ENCOUNTER — IMMUNIZATION (OUTPATIENT)
Dept: NURSING | Facility: CLINIC | Age: 31
End: 2021-05-04
Payer: COMMERCIAL

## 2021-05-04 ENCOUNTER — COMMUNICATION - HEALTHEAST (OUTPATIENT)
Dept: INTERNAL MEDICINE | Facility: CLINIC | Age: 31
End: 2021-05-04

## 2021-05-04 ENCOUNTER — RECORDS - HEALTHEAST (OUTPATIENT)
Dept: RHEUMATOLOGY | Facility: CLINIC | Age: 31
End: 2021-05-04

## 2021-05-04 DIAGNOSIS — M79.7 FIBROMYALGIA: ICD-10-CM

## 2021-05-04 PROCEDURE — 0001A PR COVID VAC PFIZER DIL RECON 30 MCG/0.3 ML IM: CPT

## 2021-05-04 PROCEDURE — 91300 PR COVID VAC PFIZER DIL RECON 30 MCG/0.3 ML IM: CPT

## 2021-05-05 ENCOUNTER — RECORDS - HEALTHEAST (OUTPATIENT)
Dept: RHEUMATOLOGY | Facility: CLINIC | Age: 31
End: 2021-05-05
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
## 2021-05-13 DIAGNOSIS — M79.7 FIBROMYALGIA: Primary | ICD-10-CM

## 2021-05-13 PROCEDURE — 84450 TRANSFERASE (AST) (SGOT): CPT | Performed by: INTERNAL MEDICINE

## 2021-05-13 PROCEDURE — 36415 COLL VENOUS BLD VENIPUNCTURE: CPT | Performed by: INTERNAL MEDICINE

## 2021-05-13 PROCEDURE — 84460 ALANINE AMINO (ALT) (SGPT): CPT | Performed by: INTERNAL MEDICINE

## 2021-05-14 LAB
ALT SERPL W P-5'-P-CCNC: 29 U/L (ref 0–50)
AST SERPL W P-5'-P-CCNC: 15 U/L (ref 0–45)

## 2021-05-15 ENCOUNTER — COMMUNICATION - HEALTHEAST (OUTPATIENT)
Dept: INTERNAL MEDICINE | Facility: CLINIC | Age: 31
End: 2021-05-15

## 2021-05-15 DIAGNOSIS — F33.1 MODERATE EPISODE OF RECURRENT MAJOR DEPRESSIVE DISORDER (H): ICD-10-CM

## 2021-05-17 ENCOUNTER — MEDICAL CORRESPONDENCE (OUTPATIENT)
Dept: HEALTH INFORMATION MANAGEMENT | Facility: CLINIC | Age: 31
End: 2021-05-17

## 2021-05-17 ENCOUNTER — OFFICE VISIT - HEALTHEAST (OUTPATIENT)
Dept: INTERNAL MEDICINE | Facility: CLINIC | Age: 31
End: 2021-05-17

## 2021-05-17 DIAGNOSIS — Z78.9 USES BIRTH CONTROL: ICD-10-CM

## 2021-05-17 DIAGNOSIS — M79.7 FIBROMYALGIA: ICD-10-CM

## 2021-05-17 DIAGNOSIS — F41.9 ANXIETY: ICD-10-CM

## 2021-05-17 DIAGNOSIS — E03.9 HYPOTHYROIDISM, UNSPECIFIED TYPE: ICD-10-CM

## 2021-05-17 DIAGNOSIS — F33.41 RECURRENT MAJOR DEPRESSIVE DISORDER, IN PARTIAL REMISSION (H): ICD-10-CM

## 2021-05-17 ASSESSMENT — ANXIETY QUESTIONNAIRES
2. NOT BEING ABLE TO STOP OR CONTROL WORRYING: MORE THAN HALF THE DAYS
1. FEELING NERVOUS, ANXIOUS, OR ON EDGE: MORE THAN HALF THE DAYS
IF YOU CHECKED OFF ANY PROBLEMS ON THIS QUESTIONNAIRE, HOW DIFFICULT HAVE THESE PROBLEMS MADE IT FOR YOU TO DO YOUR WORK, TAKE CARE OF THINGS AT HOME, OR GET ALONG WITH OTHER PEOPLE: SOMEWHAT DIFFICULT
4. TROUBLE RELAXING: SEVERAL DAYS
3. WORRYING TOO MUCH ABOUT DIFFERENT THINGS: SEVERAL DAYS
6. BECOMING EASILY ANNOYED OR IRRITABLE: NOT AT ALL
7. FEELING AFRAID AS IF SOMETHING AWFUL MIGHT HAPPEN: NOT AT ALL
GAD7 TOTAL SCORE: 7
5. BEING SO RESTLESS THAT IT IS HARD TO SIT STILL: SEVERAL DAYS

## 2021-05-17 ASSESSMENT — PATIENT HEALTH QUESTIONNAIRE - PHQ9: SUM OF ALL RESPONSES TO PHQ QUESTIONS 1-9: 9

## 2021-05-25 ENCOUNTER — IMMUNIZATION (OUTPATIENT)
Dept: NURSING | Facility: CLINIC | Age: 31
End: 2021-05-25
Attending: INTERNAL MEDICINE
Payer: COMMERCIAL

## 2021-05-25 PROCEDURE — 91300 PR COVID VAC PFIZER DIL RECON 30 MCG/0.3 ML IM: CPT

## 2021-05-25 PROCEDURE — 0002A PR COVID VAC PFIZER DIL RECON 30 MCG/0.3 ML IM: CPT

## 2021-05-26 ASSESSMENT — PATIENT HEALTH QUESTIONNAIRE - PHQ9: SUM OF ALL RESPONSES TO PHQ QUESTIONS 1-9: 14

## 2021-05-27 ASSESSMENT — PATIENT HEALTH QUESTIONNAIRE - PHQ9
SUM OF ALL RESPONSES TO PHQ QUESTIONS 1-9: 12
SUM OF ALL RESPONSES TO PHQ QUESTIONS 1-9: 9
SUM OF ALL RESPONSES TO PHQ QUESTIONS 1-9: 12
SUM OF ALL RESPONSES TO PHQ QUESTIONS 1-9: 8

## 2021-05-28 ASSESSMENT — ASTHMA QUESTIONNAIRES
ACT_TOTALSCORE: 23
ACT_TOTALSCORE: 25
ACT_TOTALSCORE: 13
ACT_TOTALSCORE: 18

## 2021-05-28 ASSESSMENT — ANXIETY QUESTIONNAIRES
GAD7 TOTAL SCORE: 7
GAD7 TOTAL SCORE: 13

## 2021-05-28 NOTE — TELEPHONE ENCOUNTER
Recommend patient first have creatinine and liver enzymes checked.  Thereafter she can contact us and will consider providing refill.  Recommend she also schedule a follow-up appointment with us, within the next 2 to 3 months.

## 2021-05-28 NOTE — TELEPHONE ENCOUNTER
Last ov- 8/30/18  Last labs- 9/17/18, 12/27/18    No future appt scheduled, asked schedulers to call to schedule pt.

## 2021-05-28 NOTE — TELEPHONE ENCOUNTER
Please call pt to come in for lab only appt prior to med refills.     Pt has appt with Dr Thompson 6/4/19    (send message back once scheduled)

## 2021-05-29 NOTE — PATIENT INSTRUCTIONS - HE
Summary of Your Rheumatology Visit    Next Appointment:  6 Months      Medications:    Please follow directives on pill bottle on how to take medication(s) provided.      Referrals:        Tests:     Please have labs and x-rays that were ordered performed.    Repeat labs in 3 months.        Injections:          Other:
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
## 2021-05-30 NOTE — TELEPHONE ENCOUNTER
Can you please retrieve and modify my previous letter to include request by patient (see below in bold), thanks..    It hurts to stand still for just five minutes. So, if I could receive a note stating I need to have either a chair or a stool available for me to use during the entirety of my shift, I'd appreciate it.

## 2021-05-30 NOTE — TELEPHONE ENCOUNTER
"Created a new letter encounter from letter previously written on 6/18/18 to include the updated information listed. I routed the letter to your bucket to sign off on under \"letter queue\".   "

## 2021-05-30 NOTE — TELEPHONE ENCOUNTER
Called patient to find out if she needs the letter right away or mailed. Left message to call back.    Regine Brown, CMA

## 2021-05-30 NOTE — TELEPHONE ENCOUNTER
Letter signed, please inquire from patient if desires to  or if wishes that we send to her.  Please also scan into our system.

## 2021-05-31 VITALS — WEIGHT: 125 LBS | BODY MASS INDEX: 20.83 KG/M2 | HEIGHT: 65 IN

## 2021-06-01 ENCOUNTER — COMMUNICATION - HEALTHEAST (OUTPATIENT)
Dept: RHEUMATOLOGY | Facility: CLINIC | Age: 31
End: 2021-06-01

## 2021-06-01 VITALS — BODY MASS INDEX: 21.49 KG/M2 | HEIGHT: 65 IN | WEIGHT: 129 LBS

## 2021-06-01 VITALS — WEIGHT: 129 LBS | BODY MASS INDEX: 21.47 KG/M2

## 2021-06-01 VITALS — HEIGHT: 65 IN | BODY MASS INDEX: 21.99 KG/M2 | WEIGHT: 132 LBS

## 2021-06-01 VITALS — BODY MASS INDEX: 19.83 KG/M2 | WEIGHT: 119 LBS | HEIGHT: 65 IN

## 2021-06-01 DIAGNOSIS — M25.561 CHRONIC PAIN OF BOTH KNEES: ICD-10-CM

## 2021-06-01 DIAGNOSIS — G89.29 CHRONIC PAIN OF BOTH KNEES: ICD-10-CM

## 2021-06-01 DIAGNOSIS — M25.562 CHRONIC PAIN OF BOTH KNEES: ICD-10-CM

## 2021-06-01 DIAGNOSIS — M79.7 FIBROMYALGIA: ICD-10-CM

## 2021-06-02 ENCOUNTER — VIRTUAL VISIT (OUTPATIENT)
Dept: PSYCHOLOGY | Facility: CLINIC | Age: 31
End: 2021-06-02
Payer: COMMERCIAL

## 2021-06-02 VITALS — BODY MASS INDEX: 20.29 KG/M2 | WEIGHT: 121.9 LBS

## 2021-06-02 VITALS — WEIGHT: 123.5 LBS | BODY MASS INDEX: 20.55 KG/M2

## 2021-06-02 VITALS — WEIGHT: 119 LBS | BODY MASS INDEX: 19.83 KG/M2 | HEIGHT: 65 IN

## 2021-06-02 DIAGNOSIS — F41.9 ANXIETY DISORDER, UNSPECIFIED TYPE: Primary | ICD-10-CM

## 2021-06-02 PROCEDURE — 90834 PSYTX W PT 45 MINUTES: CPT | Mod: 95 | Performed by: SOCIAL WORKER

## 2021-06-02 ASSESSMENT — ANXIETY QUESTIONNAIRES
GAD7 TOTAL SCORE: 4
1. FEELING NERVOUS, ANXIOUS, OR ON EDGE: SEVERAL DAYS
7. FEELING AFRAID AS IF SOMETHING AWFUL MIGHT HAPPEN: NOT AT ALL
6. BECOMING EASILY ANNOYED OR IRRITABLE: NOT AT ALL
IF YOU CHECKED OFF ANY PROBLEMS ON THIS QUESTIONNAIRE, HOW DIFFICULT HAVE THESE PROBLEMS MADE IT FOR YOU TO DO YOUR WORK, TAKE CARE OF THINGS AT HOME, OR GET ALONG WITH OTHER PEOPLE: SOMEWHAT DIFFICULT
5. BEING SO RESTLESS THAT IT IS HARD TO SIT STILL: NOT AT ALL
4. TROUBLE RELAXING: SEVERAL DAYS
2. NOT BEING ABLE TO STOP OR CONTROL WORRYING: SEVERAL DAYS
3. WORRYING TOO MUCH ABOUT DIFFERENT THINGS: SEVERAL DAYS

## 2021-06-02 ASSESSMENT — PATIENT HEALTH QUESTIONNAIRE - PHQ9: SUM OF ALL RESPONSES TO PHQ QUESTIONS 1-9: 4

## 2021-06-02 NOTE — TELEPHONE ENCOUNTER
Due to be seen with labs    Rx renewed per Medication Renewal Policy. Medication was last renewed on 2/11/19.    Tricia Oneil, [facility] Triage/Med Refill 10/28/2019     Requested Prescriptions   Pending Prescriptions Disp Refills     levothyroxine (SYNTHROID, LEVOTHROID) 112 MCG tablet [Pharmacy Med Name: LEVOTHYROXINE 0.112MG (112MCG) TABS] 30 tablet 8     Sig: TAKE 1 TABLET BY MOUTH DAILY       Thyroid Hormones Protocol Passed - 10/28/2019  5:21 AM        Passed - Provider visit in past 12 months or next 3 months     Last office visit with prescriber/PCP: 11/8/2018 Meka Montemayor MD OR same dept: Visit date not found OR same specialty: 5/22/2018 Ryan Coleman MD  Last physical: Visit date not found Last MTM visit: Visit date not found   Next visit within 3 mo: Visit date not found  Next physical within 3 mo: Visit date not found  Prescriber OR PCP: Meka Montemayor MD  Last diagnosis associated with med order: 1. Hypothyroid  - levothyroxine (SYNTHROID, LEVOTHROID) 112 MCG tablet [Pharmacy Med Name: LEVOTHYROXINE 0.112MG (112MCG) TABS]; TAKE 1 TABLET BY MOUTH DAILY  Dispense: 30 tablet; Refill: 8    If protocol passes may refill for 12 months if within 3 months of last provider visit (or a total of 15 months).             Passed - TSH on file in past 12 months for patient age 12 & older     TSH   Date Value Ref Range Status   11/08/2018 0.91 0.30 - 5.00 uIU/mL Final

## 2021-06-03 ENCOUNTER — DOCUMENTATION ONLY (OUTPATIENT)
Dept: LAB | Facility: CLINIC | Age: 31
End: 2021-06-03

## 2021-06-03 DIAGNOSIS — E03.9 ACQUIRED HYPOTHYROIDISM: Primary | ICD-10-CM

## 2021-06-03 ASSESSMENT — ANXIETY QUESTIONNAIRES: GAD7 TOTAL SCORE: 4

## 2021-06-03 NOTE — TELEPHONE ENCOUNTER
I called in   tiZANidine (ZANAFLEX) 2 MG tablet 270 tablet 0 11/29/2019  No   Sig: Take 1-4 tablets qhs, start low-dose and gradually increase prn for muscle ache or insomnia, as tolerated.

## 2021-06-03 NOTE — PROGRESS NOTES
Patient has an upcoming lab appointment for TSH. Please order test/s as future if needed or notified pt if lab [place] is no longer needed.     Thanks,  [facility]

## 2021-06-03 NOTE — TELEPHONE ENCOUNTER
Refill request for Tizanidine 2 mg [facility] [address] [place]. Last visit 5/30/19, next OV 3/19/20, last labs 8/2019 WNL, however no CBC.

## 2021-06-03 NOTE — TELEPHONE ENCOUNTER
Okay to refill tizanidine with sufficient supply to bridge gap until follow-up visit.      (Typically no need to check CBC while on tizanidine, if CBC on current refill protocol, can remove).

## 2021-06-04 VITALS
BODY MASS INDEX: 21.99 KG/M2 | OXYGEN SATURATION: 99 % | HEART RATE: 72 BPM | HEIGHT: 65 IN | DIASTOLIC BLOOD PRESSURE: 70 MMHG | SYSTOLIC BLOOD PRESSURE: 120 MMHG | WEIGHT: 132 LBS

## 2021-06-04 VITALS — WEIGHT: 130 LBS | BODY MASS INDEX: 21.66 KG/M2 | HEIGHT: 65 IN

## 2021-06-04 VITALS
WEIGHT: 130.9 LBS | BODY MASS INDEX: 21.81 KG/M2 | HEART RATE: 68 BPM | HEIGHT: 65 IN | DIASTOLIC BLOOD PRESSURE: 80 MMHG | SYSTOLIC BLOOD PRESSURE: 116 MMHG

## 2021-06-04 NOTE — TELEPHONE ENCOUNTER
RN cannot approve Refill Request    RN can NOT refill this medication Protocol failed and NO refill given.         Tricia Oneil, [facility] Triage/Med Refill 12/29/2019    Requested Prescriptions   Pending Prescriptions Disp Refills     levothyroxine (SYNTHROID, LEVOTHROID) 112 MCG tablet [Pharmacy Med Name: LEVOTHYROXINE 0.112MG (112MCG) TABS] 30 tablet 1     Sig: TAKE 1 TABLET BY MOUTH DAILY       Thyroid Hormones Protocol Failed - 12/28/2019  5:07 AM        Failed - Provider visit in past 12 months or next 3 months     Last office visit with prescriber/PCP: 11/8/2018 Meka Montemayor MD OR same dept: Visit date not found OR same specialty: 5/22/2018 Ryan Coleman MD  Last physical: Visit date not found Last MTM visit: Visit date not found   Next visit within 3 mo: Visit date not found  Next physical within 3 mo: Visit date not found  Prescriber OR PCP: Meka Montemayor MD  Last diagnosis associated with med order: 1. Hypothyroid  - levothyroxine (SYNTHROID, LEVOTHROID) 112 MCG tablet [Pharmacy Med Name: LEVOTHYROXINE 0.112MG (112MCG) TABS]; TAKE 1 TABLET BY MOUTH DAILY  Dispense: 30 tablet; Refill: 1    If protocol passes may refill for 12 months if within 3 months of last provider visit (or a total of 15 months).             Failed - TSH on file in past 12 months for patient age 12 & older     TSH   Date Value Ref Range Status   11/08/2018 0.91 0.30 - 5.00 uIU/mL Final

## 2021-06-04 NOTE — TELEPHONE ENCOUNTER
Please let pt know, Synthroid refilled for 30 days only. She needs to stop by the lab for thyroid lab for further refills.

## 2021-06-05 VITALS
HEART RATE: 72 BPM | SYSTOLIC BLOOD PRESSURE: 126 MMHG | WEIGHT: 134.7 LBS | DIASTOLIC BLOOD PRESSURE: 78 MMHG | OXYGEN SATURATION: 99 % | BODY MASS INDEX: 22.42 KG/M2

## 2021-06-05 NOTE — TELEPHONE ENCOUNTER
Please sent her a letter, I will not be able to refill her medication again until she has blood work done. And blood work will needs to be done while on Synthroid, so I can see if levels need to be adjusted. If she wants me to manage her Synthroid, she will need to have blood levels checked minimum once a year (orders are in, she needs to stop by the lab).

## 2021-06-05 NOTE — TELEPHONE ENCOUNTER
Refill request received from [facility] pharmacy for Tizanidine.     Last OV 5/30/2019  Last lab 8/30/19  Future appt 3/19/2020    Please inform pt is due for lab prior to med refill.

## 2021-06-05 NOTE — TELEPHONE ENCOUNTER
Controlled Substance Refill Request  Medication Name:   Requested Prescriptions     Pending Prescriptions Disp Refills     tiZANidine (ZANAFLEX) 2 MG tablet 270 tablet 1     Sig: Take 1-4 tablets qhs, start low-dose and gradually increase prn for muscle ache or insomnia, as tolerated.     Date Last Fill: 05/30/2019  Requested Pharmacy: [id number] - [place], [place] - [address] [place] [address] AT [facility] & [address]  Submit electronically to pharmacy  Controlled Substance Agreement on file:   Encounter-Level CSA Scan Date:    There are no encounter-level csa scan date.        Last office visit:  [id number]

## 2021-06-05 NOTE — TELEPHONE ENCOUNTER
Forms Request  Name of form/paperwork: Other:  certification of need for reasonable accommodation  Have you been seen for this request: Yes:  last appointment  Do we have the form: Yes- in dr mclaughlin's box  When is form needed by: ASAP-aware of 3 to 5 day rule  How would you like the form returned: Fax:  [phone]  Patient Notified form requests are processed in 3-5 business days: Yes    Okay to leave a detailed message? Yes  Patient would like to be called once paperwork has been faxed. Okay to leave a message

## 2021-06-05 NOTE — TELEPHONE ENCOUNTER
Please check with pt and see how many thyroid pills she has left.  I pended thyroid labs to be done twice last year.  I can not manage this medication if I can not monitor levels at least once a year(last TSH was 2 years ago).  See if she can do labs before she rans out.

## 2021-06-05 NOTE — TELEPHONE ENCOUNTER
Forms Request  Name of form/paperwork: Other:  To terminate her lease. due to patient's allergy to tobacco smoke.  Have you been seen for this request: N/A  Do we have the form: Patient will drop off at  today  When is form needed by: asap  How would you like the form returned: Fax:  on form  Patient Notified form requests are processed in 3-5 business days: No    Okay to leave a detailed message? Yes

## 2021-06-05 NOTE — TELEPHONE ENCOUNTER
Last ov- 5/30/19  Last labs- 8/30/19    Future appt-3/19/20    Please advise refill, outside RN refill protocol range- over 6 months since last ov

## 2021-06-05 NOTE — TELEPHONE ENCOUNTER
RN cannot approve Refill Request    RN can NOT refill this medication Protocol failed and NO refill given.       Tricia Oneil, [facility] Triage/Med Refill 1/27/2020    Requested Prescriptions   Pending Prescriptions Disp Refills     levothyroxine (SYNTHROID, LEVOTHROID) 112 MCG tablet [Pharmacy Med Name: LEVOTHYROXINE 0.112MG (112MCG) TABS] 30 tablet 0     Sig: TAKE ONE TABLET DAILY       Thyroid Hormones Protocol Failed - 1/27/2020  5:15 AM        Failed - Provider visit in past 12 months or next 3 months     Last office visit with prescriber/PCP: 11/8/2018 Meka Montemayor MD OR same dept: Visit date not found OR same specialty: 5/22/2018 Ryan Coleman MD  Last physical: Visit date not found Last MTM visit: Visit date not found   Next visit within 3 mo: Visit date not found  Next physical within 3 mo: Visit date not found  Prescriber OR PCP: Meka Montemayor MD  Last diagnosis associated with med order: 1. Hypothyroid  - levothyroxine (SYNTHROID, LEVOTHROID) 112 MCG tablet [Pharmacy Med Name: LEVOTHYROXINE 0.112MG (112MCG) TABS]; TAKE ONE TABLET DAILY  Dispense: 30 tablet; Refill: 0    If protocol passes may refill for 12 months if within 3 months of last provider visit (or a total of 15 months).             Failed - TSH on file in past 12 months for patient age 12 & older     TSH   Date Value Ref Range Status   11/08/2018 0.91 0.30 - 5.00 uIU/mL Final

## 2021-06-06 NOTE — TELEPHONE ENCOUNTER
I have not seen her for over a year.Please ask her to schedule appt and have paperwork filled out.   I'm not sure if I have filled out  this type of form  before (nothing in the Media).    [place]

## 2021-06-06 NOTE — TELEPHONE ENCOUNTER
Not sure how this happened, but pt's TSH was ordered twice (may be one of orders was old one from me?).  Could we write off one of those, please?   I'm not sure why this happened.

## 2021-06-06 NOTE — TELEPHONE ENCOUNTER
Pt rescheduled her March appt to July 2020, last seen May 2019. Pt was to be seen prior to future refills.     Please advise if ok to stop Tizanidine since pt has not followed up in clinic or advise refill. Schedule availability is limited in coming months.

## 2021-06-06 NOTE — TELEPHONE ENCOUNTER
Refill Approved    Rx renewed per Medication Renewal Policy. Medication was last renewed on 1/31/20.    Tricia Oneil, [facility] Triage/Med Refill 2/26/2020     Requested Prescriptions   Pending Prescriptions Disp Refills     levothyroxine (SYNTHROID, LEVOTHROID) 112 MCG tablet [Pharmacy Med Name: LEVOTHYROXINE 0.112MG (112MCG) TABS] 30 tablet 0     Sig: TAKE 1 TABLET BY MOUTH EVERY DAY       Thyroid Hormones Protocol Passed - 2/26/2020  5:14 AM        Passed - Provider visit in past 12 months or next 3 months     Last office visit with prescriber/PCP: 2/13/2020 Meka Montemayor MD OR same dept: Visit date not found OR same specialty: 5/22/2018 Ryan Coleman MD  Last physical: Visit date not found Last MTM visit: Visit date not found   Next visit within 3 mo: Visit date not found  Next physical within 3 mo: Visit date not found  Prescriber OR PCP: Meka Montemayor MD  Last diagnosis associated with med order: 1. Hypothyroid  - levothyroxine (SYNTHROID, LEVOTHROID) 112 MCG tablet [Pharmacy Med Name: LEVOTHYROXINE 0.112MG (112MCG) TABS]; TAKE 1 TABLET BY MOUTH EVERY DAY  Dispense: 30 tablet; Refill: 0    If protocol passes may refill for 12 months if within 3 months of last provider visit (or a total of 15 months).             Passed - TSH on file in past 12 months for patient age 12 & older     TSH   Date Value Ref Range Status   02/17/2020 0.64 0.30 - 5.00 uIU/mL Final

## 2021-06-06 NOTE — TELEPHONE ENCOUNTER
Date: 2/17/2020 Status: Arrived   Time: 12:45 PM Length: 15   Visit Type: LAB [[id number]] Copay: $0.00   Provider: [facility]

## 2021-06-06 NOTE — TELEPHONE ENCOUNTER
Labs from 2/17/2020 noted to be stable.    If no new symptoms okay to refill sufficient supply to bridge gap until next follow-up appointment.  Please emphasize to patient that she needs to maintain next appointment for reassessment prior to additional refills.
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
## 2021-06-06 NOTE — TELEPHONE ENCOUNTER
Refill request received from [facility] for Tizanidine.     Last OV 5/30/2019  Last lab 2/17/2020   Future appt 7/6/2020    Last refilled on 2/10/2020, there is a noted stated that pt to maintain appt in March 2020 for further long-term med refill.       Pt cancelled appt and gabino appt to July 2020. Please review and advise if refill appropriate.

## 2021-06-06 NOTE — PATIENT INSTRUCTIONS - HE
1. Will check blood work today    2. Let me know if continue to have breathing problems at night after you change your apartment.  albuterol and use it as needed.     3. If depression gets worse come to [facility]'s ER    4. Will need to have repeat pap smear done with Gyn ( ? Lidocaine use for pap)

## 2021-06-06 NOTE — TELEPHONE ENCOUNTER
Left pt detailed message relaying pcp message.  Advised pt to call back to schedule or with further questions.

## 2021-06-07 DIAGNOSIS — E03.9 HYPOTHYROIDISM, UNSPECIFIED TYPE: Primary | ICD-10-CM

## 2021-06-07 DIAGNOSIS — E03.9 HYPOTHYROIDISM, UNSPECIFIED TYPE: ICD-10-CM

## 2021-06-07 LAB — TSH SERPL DL<=0.005 MIU/L-ACNC: 0.15 MU/L (ref 0.4–4)

## 2021-06-07 PROCEDURE — 36415 COLL VENOUS BLD VENIPUNCTURE: CPT | Performed by: INTERNAL MEDICINE

## 2021-06-07 PROCEDURE — 84443 ASSAY THYROID STIM HORMONE: CPT | Performed by: INTERNAL MEDICINE

## 2021-06-07 NOTE — PATIENT INSTRUCTIONS - HE
Summary of Your Rheumatology Visit    Next Appointment:  4 months    Medications:    Please follow directives on pill bottle on how to take medication(s) provided.      Referrals:      Tests:     Please have labs performed in about 8 weeks.       Injections:        Other:

## 2021-06-07 NOTE — TELEPHONE ENCOUNTER
Roni nor I have recently called the pt.     Perhaps someone called to schedule future appts with Dr Thompson as pt was just seen on 4/27.     Pt needs lab appt in 2 months and follow up with Dr Thompson in 4 months. Please call pt to schedule

## 2021-06-07 NOTE — TELEPHONE ENCOUNTER
05.01 - called x 1 - pt states that it was dr wolf calling her. Relayed that needs lab and f/u appt. Pt is driving and asked for us to call later today or Monday.

## 2021-06-07 NOTE — TELEPHONE ENCOUNTER
Please check to see how many tabs of tizanidine she is taking daily. I just refilled 90 tabs on April 7th.    If she takes 2 tabs ( 4mg)  twice a day we can switch to 4 mg tabs , so there is not too many pills.

## 2021-06-07 NOTE — TELEPHONE ENCOUNTER
RN cannot approve Refill Request    RN can NOT refill this medication med is not covered by policy/route to provider     . Last office visit: 2/13/2020 Meka Montemayor MD Last Physical: Visit date not found Last MTM visit: Visit date not found Last visit same specialty: 2/13/2020 Meka Montemayor MD.  Next visit within 3 mo: Visit date not found  Next physical within 3 mo: Visit date not found      Tricia Oneil, [facility] Triage/Med Refill 4/28/2020    Requested Prescriptions   Pending Prescriptions Disp Refills     tiZANidine (ZANAFLEX) 2 MG tablet [Pharmacy Med Name: TIZANIDINE 2MG TABLETS] 90 tablet 0     Sig: SEE NOTES       There is no refill protocol information for this order

## 2021-06-07 NOTE — TELEPHONE ENCOUNTER
1. What is the nature of the form? Housing form    2. Has patient signed form if applicable? YES    3. Where should completed form go? PLEASE CALL PT WHEN READY     4. When was patient's last appointment with [facility]? 2020    5. If further questions or when form is completed, is it okay to leave detailed message on patient's phone? YES

## 2021-06-07 NOTE — TELEPHONE ENCOUNTER
"Looks like just the tizanidine was sent to the pharmacy. Set up medications to be sent to pharmacy, please advise sig and qty      \"Deferred going to urgent care orthopedics to assess knee pains for possible cortisone injections however may be open to this suggestion if knee pains persist..  For now, she would rather try tramadol sparingly to be used only on a when necessary basis.\"    \"We will add Celebrex 200 mg twice daily as needed.\"  "
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
## 2021-06-07 NOTE — TELEPHONE ENCOUNTER
RN cannot approve Refill Request    RN can NOT refill this medication med is not covered by policy/route to provider       . Last office visit: 5/30/2019 Elmer Thompson,  Last Physical: Visit date not found Last MTM visit: Visit date not found Last visit same specialty: 2/13/2020 Meka Montemayor MD.  Next visit within 3 mo: Visit date not found  Next physical within 3 mo: Visit date not found      Tricia Oneil, [facility] Triage/Med Refill 4/7/2020    Requested Prescriptions   Pending Prescriptions Disp Refills     tiZANidine (ZANAFLEX) 2 MG tablet 90 tablet 0     Sig: Take 1-4 tablets qhs, start low-dose and gradually increase prn for muscle ache or insomnia, as tolerated.       There is no refill protocol information for this order

## 2021-06-07 NOTE — TELEPHONE ENCOUNTER
Date: 9/8/2020 Status: [facility]   Time: 10:00 AM Length: 30   Visit Type: OFFICE VISIT [[id number]] Copay: $0.00   Provider: Elmer Thompson DO

## 2021-06-09 NOTE — TELEPHONE ENCOUNTER
RN cannot approve Refill Request    RN can NOT refill this medication med is not covered by policy/route to provider. Last office visit: 2/13/2020 Meka Montemayor MD Last Physical: Visit date not found Last MTM visit: Visit date not found Last visit same specialty: 2/13/2020 Meka Montemayor MD.  Next visit within 3 mo: Visit date not found  Next physical within 3 mo: Visit date not found      Daisy Callahan, [facility] Triage/Med Refill 6/27/2020    Requested Prescriptions   Pending Prescriptions Disp Refills     tiZANidine (ZANAFLEX) 4 MG tablet [Pharmacy Med Name: TIZANIDINE 4MG TABLETS] 60 tablet 0     Sig: TAKE 2 TABLETS BY MOUTH AT BEDTIME       There is no refill protocol information for this order

## 2021-06-10 ENCOUNTER — AMBULATORY - HEALTHEAST (OUTPATIENT)
Dept: INTERNAL MEDICINE | Facility: CLINIC | Age: 31
End: 2021-06-10

## 2021-06-10 DIAGNOSIS — E03.9 ACQUIRED HYPOTHYROIDISM: ICD-10-CM
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
## 2021-06-11 ENCOUNTER — E-VISIT (OUTPATIENT)
Dept: URGENT CARE | Facility: URGENT CARE | Age: 31
End: 2021-06-11
Payer: COMMERCIAL

## 2021-06-11 DIAGNOSIS — K64.4 EXTERNAL HEMORRHOIDS: Primary | ICD-10-CM

## 2021-06-11 PROCEDURE — 99207 PR NON-BILLABLE SERV PER CHARTING: CPT | Performed by: PHYSICIAN ASSISTANT

## 2021-06-11 NOTE — PATIENT INSTRUCTIONS
Dear Mikaela Vernon    I don't know if this person is working but recommend you call your clinic.    Thanks for choosing us as your health care partner,    Karan Bautista PA-C
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
## 2021-06-11 NOTE — TELEPHONE ENCOUNTER
Please send patient copies of her x-ray reports from May 2019 involving thoracic spine, lumbar spine and SI joints.

## 2021-06-11 NOTE — TELEPHONE ENCOUNTER
RN cannot approve Refill Request    RN can NOT refill this medication med is not covered by policy/route to provider. Last office visit: 2/13/2020 Meka Montemayor MD Last Physical: Visit date not found Last MTM visit: Visit date not found Last visit same specialty: 2/13/2020 Meka Montemayor MD.  Next visit within 3 mo: Visit date not found  Next physical within 3 mo: Visit date not found      Daisy Callahan, [facility] Triage/Med Refill 9/27/2020    Requested Prescriptions   Pending Prescriptions Disp Refills     tiZANidine (ZANAFLEX) 4 MG tablet [Pharmacy Med Name: TIZANIDINE 4MG TABLETS] 60 tablet 2     Sig: TAKE 2 TABLETS BY MOUTH AT BEDTIME       There is no refill protocol information for this order

## 2021-06-11 NOTE — PATIENT INSTRUCTIONS - HE
Summary of Your Rheumatology Visit    Next Appointment:  4 Months    Medications:    Continue medications provided, as discussed.    Referrals:      Tests:     Please have labs performed within 4 weeks.       Injections:        Other:    Regarding symptoms of decreased appetite, if symptoms persist recommend that you discuss further with your primary care physician, to possibly further evaluate/manage.

## 2021-06-12 NOTE — TELEPHONE ENCOUNTER
----- Message from Huma Douglas PA-C sent at 11/2/2020  7:39 AM CST -----  Please call the patient and let them know that I reviewed their scans.  MRI c-spine is normal.  MRI lumbar spine does show some disc degeneration and a disc bulge at L5/S1 on the left.  This could be a source of back pain and potentially left leg pain.  Disc bulges typically improve with PT and pain related to disc degeneration can improve with core strengthening.  Recommend that the patient begin PT and follow up with me in four weeks.  I am happy to see them back sooner if they prefer to review the results.  Thanks!

## 2021-06-12 NOTE — TELEPHONE ENCOUNTER
Patient Returning Call  Reason for call:  See message   Information relayed to patient:  Per Message  Patient has additional questions:  No  If YES, what are your questions/concerns:  N/A  Okay to leave a detailed message?: No call back needed   Patient will proceed with physical therapy
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
## 2021-06-12 NOTE — PROGRESS NOTES
ASSESSMENT: Mikaela Vernon is a 29 y.o. adult with past medical history significant for autism, chronic pain, hypothyroidism, depression, PTSD, anxiety, fibromyalgia who presents today for new patient evaluation of 2 areas of chronic pain.  1.  Chronic bilateral neck pain without radicular symptoms.  Patient has not had any imaging of the cervical spine.  2.  Chronic bilateral low back pain.  Patient also has chronic bilateral lower extremity pain, however, pain does not sound radicular in nature.  Pain involves the joints (primarily the knees, but also ankles and hips).  Patient does see rheumatology.  -Patient was neurologically intact on exam.  Pain appears to be largely myofascial, but would like to rule out underlying/additional sources of pain in this young person with chronic spine pain.    NDI: 32  CLIFF: 40  Who 5: 7     PLAN:  A shared decision making model was used.  The patient's values and choices were respected.  The following represents what was discussed and decided upon by the physician assistant and the patient.      1.  DIAGNOSTIC TESTS:  I reviewed x-rays of the thoracic spine, lumbar spine, and sacroiliac joints from May 2019.  I reviewed MRI scans of the cervical and lumbar spine for further evaluation of chronic neck and back pain in a young person.    2.  PHYSICAL THERAPY:  Discussed the importance of core strengthening, ROM, stretching exercises with the patient and how each of these entities is important in decreasing pain.  Explained to the patient that the purpose of physical therapy is to teach the patient a home exercise program.  These exercises need to be performed every day in order to decrease pain and prevent future occurrences of pain.   I entered a referral for physical therapy.  No changes are made to the patient's medications.      3.  MEDICATIONS:   -Patient is using gabapentin 2 Capsules 3 times daily as needed.  I did not increase the dose of this medication.  They are  tolerating it well.  They note that they would like to avoid any medications that could increase suicidal thoughts.  They have a history of multiple suicide attempts as a young adult  - Patient will switch from Flexeril to tizanidine.  Flexeril has not been helpful.  -NSAIDs have not been helpful.  -Tylenol has not been helpful.    4.  INTERVENTIONS: No interventions were ordered.  Patient may benefit from interventional pain management if they fail to improve with conservative treatment, depending on the results of advanced imaging studies.    5.  PATIENT EDUCATION: Patient is in agreement the above plan.  All questions were answered.  -This patient may benefit from a referral to behavioral health, however, they report depression is under good control currently.  They previously had an individual psychotherapist but had to stop going to that psychotherapist because of a busy schedule.    6.  FOLLOW-UP:   A nurse will call the patient with the results of their MRI scans.  At that time I will likely recommend that they continue with physical therapy and follow-up with me in 4 weeks.  If they have any questions or concerns in the meantime, they should not hesitate to call.        SUBJECTIVE:  Mikaela Vernon  Is a 29 y.o. adult who presents today in consultation at request of Dr. Thompson for new patient evaluation of neck pain and low back pain as well as bilateral lower extremity pain.  Patient reports that they began to have pain about 10 years ago.  They deny any injury or event to cause the pain.  Patient reports the pain has actually been under better control recently since they got a new job where they can reposition from seated to standing frequently.  However, they would like to be proactive in further evaluating their pain and trialing additional treatment options.    Patient complains of pain in the lower neck which extends into the upper thoracic region bilaterally, between the shoulder blades.   They deny any pain radiating down the arms.  They do have intermittent headaches but they do not seem related to the neck pain.    Patient also complains of pain in the bilateral lower back.  Pain spans along the lumbar spine bilaterally.  They experience pain in the lower extremities involving the feet/ankles, knees, and hips, but that pain tends to travel up the legs, rather than shooting down from the low back.  Additionally, the joint pains feel like separate, distinct pains, not necessarily related to the low back.    Patient describes the pain as throbbing, aching, and occasionally shooting pains.  Pain is aggravated with going upstairs and running.  Pain is alleviated with frequently repositioning.  They deny any numbness or tingling in the arms or legs.  They experience intermittent weakness in the knees.  They deny loss of bowel or bladder control.  Denies any recent fevers, chills, or sweats.    Patient went to 2 sessions of physical therapy in 2018.  They report they had difficulty with copayments for additional sessions.  They do not go to chiropractor.  There is never had any spine surgeries or spine injections.  They are taking gabapentin 200 mg as needed.  This medicine is helpful.  They deny any significant side effects.  They have been taking Flexeril but it has not been helpful so they plan to switch to tizanidine which she has at home.  NSAIDs and Tylenol have not been helpful.    Current Outpatient Medications on File Prior to Visit   Medication Sig Dispense Refill     albuterol (PROAIR HFA;PROVENTIL HFA;VENTOLIN HFA) 90 mcg/actuation inhaler INHALE 2 PUFFS BY MOUTH EVERY 6 HOURS AS NEEDED FOR WHEEZING 54 g 0     cyclobenzaprine (FLEXERIL) 5 MG tablet One tab by mouth as needed for sleep at bedtime 30 tablet 0     desogestrel-ethinyl estradiol (ISIBLOOM) 0.15-0.03 mg per tablet TAKE 1 TABLET BY MOUTH EVERY DAY       gabapentin (NEURONTIN) 100 MG capsule Take 1-2 tab po two times a day-three times  a day (increase dose gradually, if well tolerated). 180 capsule 0     levothyroxine (SYNTHROID, LEVOTHROID) 112 MCG tablet TAKE 1 TABLET BY MOUTH EVERY DAY 90 tablet 3     MV,CALCIUM,MIN/IRON/FOLIC/VITK (MULTI FOR HER ORAL) Take by mouth.       tiZANidine (ZANAFLEX) 4 MG tablet TAKE 2 TABLETS BY MOUTH AT BEDTIME 60 tablet 2       Allergies   Allergen Reactions     Nicotine Shortness Of Breath     Tobacco     Other Environmental Allergy Anaphylaxis and Shortness Of Breath     Penicillin V Unknown     vomiting       Past Medical History:   Diagnosis Date     Anxiety      Autism      Chickenpox      Chronic pain      Depression      Disease of thyroid gland      Eating disorder     avoidant restrictive food intake d/o     PTSD (post-traumatic stress disorder)         Patient Active Problem List   Diagnosis     Autism     Chronic pain     Dental decay     Eating disorder     Hypothyroid     Recurrent major depressive episodes, in full remission (H)     PTSD (post-traumatic stress disorder)     Depression     Anxiety     Family history of fibromyalgia     Hemorrhoid     Fibromyalgia     Cervical cancer screening       Past Surgical History:   Procedure Laterality Date     WISDOM TOOTH EXTRACTION         Family History   Problem Relation Age of Onset     Fibromyalgia Mother      Rheumatologic disease Mother      Hypertension Mother      Osteoarthritis Mother      Gestational diabetes Mother      Osteopenia Mother      Depression Mother      Post-traumatic stress disorder Mother      Vision loss Mother      Clotting disorder Mother      Hearing loss Mother      Bipolar disorder Father      Diabetes Father      Leukemia Father      Vision loss Father      Depression Sister      Post-traumatic stress disorder Sister      Anxiety disorder Sister      Alcohol abuse Sister      Asthma Sister      Cervical cancer Sister      Depression Brother      ADD / ADHD Brother      Alcohol abuse Brother      Asthma Brother      Depression  Maternal Grandmother      Stroke Maternal Grandmother      Stroke Maternal Grandfather      Breast cancer Paternal Grandmother      Alcohol abuse Paternal Grandfather      Depression Sister      Alcohol abuse Sister        Social history: Patient is single.  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  They deny tobacco, alcohol, or illicit drug use.    ROS: Positive for headache, hoarseness, blood in stool, joint pain, muscle pain, muscle fatigue, nonhealing wound, insomnia, anxiety, depression.  Specifically negative for dysphagia, imbalance, fine motor skill difficulties, bowel/bladder dysfunction, fevers,chills, appetite changes, unexplained weight loss.   Otherwise 13 systems reviewed are negative.  Please see the patient's intake questionnaire from today for details.      OBJECTIVE:  PHYSICAL EXAMINATION:  CONSTITUTIONAL:  Vital signs as above.  No acute distress.  The patient is well nourished and well groomed.  PSYCHIATRIC:  The patient is awake, alert, oriented to person, place, time and answering questions appropriately with clear speech.    HEENT:  Normocephalic, atraumatic.  Sclera clear.    SKIN:  Skin over the face, bilateral upper extremities, and neck is clean, dry, intact without rashes.  LYMPH NODES:  No palpable or tender anterior/posterior cervical, submandibular, or supraclavicular lymph nodes.    MUSCLE STRENGTH:  5/5 strength for the bilateral shoulder abductors, elbow flexors/extensors, wrist extensors, finger flexors/abductors, hip flexors, knee flexors/extensors, ankle dorsi/plantar flexors, EHL.  NEURO: AMBULATES WITH A NARROW BASED, NONANTALGIC GAIT.  ABLE TO HEEL AND TOE WALK BILATERALLY WITHOUT DIFFICULTY.  TANDEM GAIT INTACT.  CN III-XII are grossly intact.  2/4 symmetric biceps, brachioradialis, triceps, patellar, and Achilles reflexes bilaterally.  Sensation to light touch intact bilateral upper and lower extremities throughout.  Negative Peterson's bilaterally.  No  ankle clonus.  Negative Babinski's bilaterally.  VASCULAR:  2/4 radial pulses bilaterally.  Warm upper limbs bilaterally.  Capillary refill in the upper extremities is less than 1 second.  MUSCULOSKELETAL: Tender to palpation bilateral cervical paraspinous muscles and upper trapezius muscles.  Tender to palpation bilateral lower lumbar paraspinous muscles.  Cervical range of motion is full.  Lumbar range of motion is full.    RESULTS:    I reviewed the x-ray thoracic spine from [facility] dated May 1, 2019.  This shows mild convex right thoracic curvature with minimal degenerative disc base narrowing and interbody spurring in the mid thoracic spine.      I reviewed the x-ray lumbar spine from [facility] dated May 1, 2019.  This shows a transitional partially lumbarized S1 but is otherwise normal.    I reviewed the x-ray sacroiliac transfer [facility] dated May 1, 2019.  This is negative.  No erosions.

## 2021-06-13 NOTE — TELEPHONE ENCOUNTER
Please mention to the patient:    Latest labs from outside facility: showed Normal creatinine/kidney function and liver enzymes.
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
## 2021-06-15 ENCOUNTER — COMMUNICATION - HEALTHEAST (OUTPATIENT)
Dept: INTERNAL MEDICINE | Facility: CLINIC | Age: 31
End: 2021-06-15

## 2021-06-15 DIAGNOSIS — F33.41 RECURRENT MAJOR DEPRESSIVE DISORDER, IN PARTIAL REMISSION (H): ICD-10-CM

## 2021-06-15 DIAGNOSIS — F41.9 ANXIETY: ICD-10-CM

## 2021-06-15 DIAGNOSIS — E03.9 HYPOTHYROID: ICD-10-CM
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
## 2021-06-15 NOTE — TELEPHONE ENCOUNTER
Central PA team  [phone]  Pool: HE PA MED ([id number])          PA has been initiated.       PA form completed and faxed insurance via Cover My Meds     Key:  [id number] - PA Case ID: [id number]     Medication:    Drug  tiZANidine HCl 4MG tablets    Insurance:  Hum[employer]        Response will be received via fax and may take up to 5-10 business days depending on plan

## 2021-06-15 NOTE — TELEPHONE ENCOUNTER
RN cannot approve Refill Request    RN can NOT refill this medication med is not covered by policy/route to provider. Last office visit: 3/1/2021 Meka Montemayor MD Last Physical: Visit date not found Last MTM visit: Visit date not found Last visit same specialty: 3/1/2021 Meka Montemayor MD.  Next visit within 3 mo: Visit date not found  Next physical within 3 mo: Visit date not found      Daisy Callahan, [facility] Triage/Med Refill 3/7/2021    Requested Prescriptions   Pending Prescriptions Disp Refills     tiZANidine (ZANAFLEX) 4 MG tablet [Pharmacy Med Name: TIZANIDINE 4MG TABLETS] 90 tablet 1     Sig: TAKE 3 TABLETS BY MOUTH AT BEDTIME       There is no refill protocol information for this order

## 2021-06-15 NOTE — TELEPHONE ENCOUNTER
Left message to call back for: Mikaela  Information to relay to patient:  Isibloom is not covered and PA has been denied.  Please ask patient to call her insurance to see what medications are covered.

## 2021-06-15 NOTE — PATIENT INSTRUCTIONS - HE
1. Will order labs and check thyroid in 1 months.     2. Sugars in the past were normal. Will have labs done in 1 month to check for diabetes Incipidus too.    3. If GI side affects get worse, let me know.

## 2021-06-15 NOTE — TELEPHONE ENCOUNTER
Prior Authorization Request  Who s requesting:  Pharmacy  Pharmacy Name and Location: [facility]  Medication Name: Tizanidine  Insurance Plan:   Insurance Member ID Number:  [id number]  CoverMyMeds Key: N/A  Informed patient that prior authorizations can take up to 10 business days for response:   Yes  Okay to leave a detailed message: Yes

## 2021-06-15 NOTE — TELEPHONE ENCOUNTER
Can we prauthorise it?  She uses it for sleep and fibromyalgia. She failed amitriptyline or gabapentin and Flexeril treatments.

## 2021-06-15 NOTE — TELEPHONE ENCOUNTER
Tizanidinine not covered - Preferred alternative if Baclofen     If appropriate send new rx to pharmacy

## 2021-06-15 NOTE — TELEPHONE ENCOUNTER
Central PA team  [phone]  Pool: HE PA MED ([id number])          Tier Exception PA has been initiated.       PA form completed and faxed insurance via Cover My Meds     Key:  [id number]     Medication:  Tizanidine     Insurance:  humana         Response will be received via fax and may take up to 5-10 business days depending on plan

## 2021-06-15 NOTE — TELEPHONE ENCOUNTER
Ana Paula Chino, CMACertified Medical AssistantSigned  Yesterday                Please do a tier exception on these 2 medications     Tizanidine and Isibloom

## 2021-06-15 NOTE — TELEPHONE ENCOUNTER
Central PA team  [phone]  Pool: HE PA MED ([id number])          Tier Exception PA has been initiated.       PA form completed and faxed insurance via Cover My Meds     Key:  [id number]     Medication:  isibloom    Insurance:  Humana        Response will be received via fax and may take up to 5-10 business days depending on plan

## 2021-06-15 NOTE — TELEPHONE ENCOUNTER
Central PA team  [phone]  Pool: HE PA MED ([id number])          PA has been initiated.       PA form completed and faxed insurance via Cover My Meds     Key:  [id number]     Medication:  tiZANidine HCl 4MG tablets    Insurance:  [employer]        Response will be received via fax and may take up to 5-10 business days depending on plan

## 2021-06-15 NOTE — PROGRESS NOTES
Assessment/plan  1. Depression  2. PTSD (post-traumatic stress disorder)  3. Anxiety  - Ambulatory referral to Psychology  Uncontrolled.   No recent treatment.   No records noting recent mental health treatment.   At this time, patient agrees her mood is not stable.   We discussed options of treatment, evaluation.   Recommended evaluation and treatment by mental health provider.   She agrees. Referral placed. Will follow recommendations.     4. Chronic pain  5. Family history of fibromyalgia  - Ambulatory referral to Rheumatology  We discussed possible diagnosis of fibromyalgia, natural course and history. She has more than eleven tender points. There is no signs of tenosynovitis.   We discussed possible therapy including gabapentin, lyrica, SSRI.   She declines medication. She wants to see a specialist to determine the correct diagnosis.   Referral placed. Will follow recommendations.     6. Hypothyroid  EHR reviewed. Past medical problems, past surgical history, problem list, social history, medications reviewed in chart and with patient.   Recent TSH showing stable hypothyroid. She will continue her current dose. Advised on recheck of this and medication adjustments if needed.     7. Autism  Unclear diagnosis and conflicting Asperger as well as autism. As above.   - Ambulatory referral to Psychology    8. Hemorrhoid  She defers physical exam.   Discussed natural course and history.   Will try anusol, sitz bath, avoid constipation.       Subjective  [age] year old female here with several concerns.   New patient.   EHR reviewed.   Past medical history, past surgical history, family history, social history, medications, reviewed in EHR.   Recently seen at [facility]. She was seen to establish care on 12/17/18. History was taken. Blood work collected. She was told to follow up. She did not follow up. Patient says she called and was told she could not be seen there. There is no explanation. TSH  at that time was normal. She is taking her thyroid medication as directed. She is taking her OCP as directed. She takes this to regulate her menstrual cycle. She denies any prior sexual activity.   She was seen at [facility] primary care clinic. She was seen to establish are in 9/2016. Was seen several times after this. She did not continue care there.   She has long history of mental illness since she was a child. She has seen mental health providers at that time. She has not seen anyone in several years. She notes her depression was stable. Her anxiety is worse now because of a conflict at work. She works at the [employer] as . She is tearful with history taking. She denies suicidal ideations. She denies homicidal ideations. She denies recent hospitalizations for mood disorders. She notes PTSD, eating disorder, stable now.   EHR notes history of autism and asperger. She does not know which is correct.   She notes chronic pain. She has had this for several years. This of most concern to her. She reports all muscles are tender, every day, she wakes up with pain and this never improves. Sleep does not improve this. When walking her dog it was better, but not anymore. She does exercise. She reports sleeping well, sleeping seven hours. She denies joint pain or swelling. No skin changes. Her sister and mom have fibromyalgia. She has seen providers here for her pain. She did not seek evaluation from her previous doctors because they were 'too condescending.' She is not sure what her diagnosis would be but is curious about fibromyalgia. She denies any prior fall or injury. She does not know how the pain started. Located in her neck, back, upper arms, shoulders, upper legs, lower legs.   She moved here from [place] a couple years ago. Her family is still in [place]. She lives with a room mate, dog and cat.   She denies smoking, use of alcohol or illicit drug use   She has hemorrhoids. She notes bleeding with bowel  movements. This has been present for several years. She is sure she has hemorrhoids and looking for advice on how to improve them.     ROS: 12 systems reviewed, all negative except for what is mentioned in HPI.     Past Medical History:   Diagnosis Date     Anxiety      Autism      Chronic pain      Depression      Disease of thyroid gland      Eating disorder      PTSD (post-traumatic stress disorder)      Patient Active Problem List   Diagnosis     Autism     Chronic pain     Dental decay     Eating disorder     Hypothyroidism     Recurrent major depressive episodes, in full remission     PTSD (post-traumatic stress disorder)     Depression     Anxiety     Family history of fibromyalgia     Hemorrhoid     History reviewed. No pertinent surgical history.  Family History   Problem Relation Age of Onset     Fibromyalgia Mother      Rheumatologic disease Mother      Social History     Social History     Marital status: Single     Spouse name: N/A     Number of children: N/A     Years of education: N/A     Occupational History     Not on file.     Social History Main Topics     Smoking status: Passive Smoke Exposure - Never Smoker     Smokeless tobacco: Never Used     Alcohol use Not on file     Drug use: Not on file     Sexual activity: Not on file     Other Topics Concern     Not on file     Social History Narrative     No narrative on file     No current outpatient prescriptions on file prior to visit.     No current facility-administered medications on file prior to visit.      Objective  Vitals:    01/30/18 1505   BP: 96/68   Pulse: 76       General Appearance:  Alert, cooperative, no distress but almost tearful when talking about her mental health, appears stated age   Head:  Normocephalic, without obvious abnormality, atraumatic   Eyes:  PERRL, conjunctiva/corneas clear, EOM's intact   Ears:  Normal TM's and external ear canals, both ears   Nose: Nares normal, septum midline,mucosa normal, no drainage    Throat:  Lips, mucosa, and tongue normal; teeth and gums normal   Neck: Supple, symmetrical, trachea midline, no adenopathy;  thyroid: not enlarged, symmetric, no tenderness/mass/nodules; no carotid bruit or JVD   Back:   Symmetric, no curvature, ROM normal, tender over cervical, thoracic, lumbar paraspinal muscles, vertebra nontender, SI joints tender, negative straight leg raise   Lungs:   Clear to auscultation bilaterally, respirations unlabored   Heart:  Regular rate and rhythm, S1 and S2 normal, no murmur, rub, or gallop   Rectal:  defers   Extremities: Extremities normal, atraumatic, no cyanosis or edema, tender over upper and lower arm bilaterally, tender over upper thighs, posterior thighs, tibia bilaterally. Pain is out of proportion to palpation.    Skin: Skin color, texture, turgor normal, no rashes or lesions   Lymph nodes: Cervical, supraclavicular nodes normal   Neurologic: Normal, CN 2-12 intact, normal strength and tone, normal gait     Lab Results   Component Value Date    TSH 3.81 12/13/2017

## 2021-06-15 NOTE — TELEPHONE ENCOUNTER
PRIOR AUTHORIZATION DENIED    Denial Rational: Called [employer] regarding denial.  Per rep since this is a non-formulary drug and was approved through a formulary exception Tizanidine is not eligible for a tier exception per medicare laws and guidelines.  Only formulary drugs are eligible for tier exceptions.
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
## 2021-06-15 NOTE — TELEPHONE ENCOUNTER
Per call to [employer] regarding inquiry below.     Rep states Baclofen tablets are the preferred alternative

## 2021-06-15 NOTE — TELEPHONE ENCOUNTER
Left message to call back for: Pt to call back to schedule    Information to relay to patient:  LVM for Pt to call back to schedule her follow up with Dr. Thompson.  Pt had sent a message via [employer] ([employer]) on 1/29 and we are just contacting her to schedule.

## 2021-06-16 DIAGNOSIS — K64.4 EXTERNAL HEMORRHOIDS: Primary | ICD-10-CM

## 2021-06-16 RX ORDER — HYDROCORTISONE ACETATE 25 MG/1
25 SUPPOSITORY RECTAL AT BEDTIME
Qty: 30 SUPPOSITORY | Refills: 0 | Status: SHIPPED | OUTPATIENT
Start: 2021-06-16 | End: 2021-06-23
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
## 2021-06-16 NOTE — TELEPHONE ENCOUNTER
Telephone Encounter by Derick Carrion at 5/28/2019  8:27 AM     Author: Derick Carrion Service: -- Author Type: Patient Access    Filed: 5/28/2019  8:28 AM Encounter Date: 5/28/2019 Status: Signed    : Derick Carrion (Patient Access)       Beryl Shah RN  Rheumatology Scheduling Registration Pool 6 minutes ago (8:21 AM)      Please call pt to see if she can come in today or Thursday to see Dr Thompson in one of the open spots.  Needs to be seen to get refills.     Routing comment       Mikaela Vernon Steve Nelson, DO 2 days ago         My insurance is ending on 5/31, and I won't be able to afford my office visit on 06/04. If there are any available times to reschedule on either 05/30 or 05/31, please let me know. Otherwise, I will have to cancel my appointment.

## 2021-06-17 ENCOUNTER — OFFICE VISIT (OUTPATIENT)
Dept: FAMILY MEDICINE | Facility: CLINIC | Age: 31
End: 2021-06-17
Payer: COMMERCIAL

## 2021-06-17 VITALS
OXYGEN SATURATION: 98 % | DIASTOLIC BLOOD PRESSURE: 80 MMHG | TEMPERATURE: 98.2 F | WEIGHT: 131.1 LBS | HEART RATE: 66 BPM | HEIGHT: 66 IN | SYSTOLIC BLOOD PRESSURE: 128 MMHG | RESPIRATION RATE: 16 BRPM | BODY MASS INDEX: 21.07 KG/M2

## 2021-06-17 DIAGNOSIS — Z80.0 FAMILY HISTORY OF COLON CANCER: ICD-10-CM

## 2021-06-17 DIAGNOSIS — K64.4 EXTERNAL HEMORRHOIDS: Primary | ICD-10-CM

## 2021-06-17 DIAGNOSIS — K62.5 RECTAL BLEEDING: ICD-10-CM

## 2021-06-17 PROCEDURE — 99203 OFFICE O/P NEW LOW 30 MIN: CPT | Performed by: PHYSICIAN ASSISTANT

## 2021-06-17 ASSESSMENT — MIFFLIN-ST. JEOR: SCORE: 1323.48
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
## 2021-06-17 NOTE — TELEPHONE ENCOUNTER
Lvmtcb. Please give pt a call to schedule. Thank you    Next Appointment:   6 Months        Tests:      Recommend having liver enzymes checked this month.

## 2021-06-17 NOTE — TELEPHONE ENCOUNTER
RN cannot approve Refill Request    RN can NOT refill this medication med is not covered by policy/route to provider. Last office visit: 3/1/2021 Meka Montemayor MD Last Physical: Visit date not found Last MTM visit: Visit date not found Last visit same specialty: 3/1/2021 Meka Montemayor MD.  Next visit within 3 mo: Visit date not found  Next physical within 3 mo: Visit date not found      Regina Hernandez, [facility] Triage/Med Refill 5/4/2021    Requested Prescriptions   Pending Prescriptions Disp Refills     tiZANidine (ZANAFLEX) 4 MG tablet [Pharmacy Med Name: TIZANIDINE 4MG TABLETS] 90 tablet 1     Sig: TAKE 3 TABLETS BY MOUTH AT BEDTIME       There is no refill protocol information for this order

## 2021-06-17 NOTE — TELEPHONE ENCOUNTER
Telephone Encounter by Noelle Childress at 2/19/2021  2:23 PM     Author: Noelle Childress Service: -- Author Type: --    Filed: 2/19/2021  2:23 PM Encounter Date: 2/18/2021 Status: Signed    : Noelle Childress       PRIOR AUTHORIZATION DENIED    Denial Rational: Drug is at the lowest cost sharing tier.             Appeal info:  No appeal can be done, medication is at the lowest cost sharing tier.
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
## 2021-06-17 NOTE — TELEPHONE ENCOUNTER
Telephone Encounter by Audrey Garcia at 2/5/2021  1:19 PM     Author: Audrey Garcia Service: -- Author Type: --    Filed: 2/5/2021  1:20 PM Encounter Date: 2/3/2021 Status: Signed    : Audrey Garcia       Information below provided to pharmacy, as well as insurance help desk information for pharmacy to contact with issues processing medication.

## 2021-06-17 NOTE — PROGRESS NOTES
"    Assessment & Plan     External hemorrhoids  Well send to colorectal to possible remove tags and hemorrhoid  - COLORECTAL SURGERY REFERRAL    Rectal bleeding      Family history of colon cancer    - GASTROENTEROLOGY ADULT REF PROCEDURE ONLY; Future                 No follow-ups on file.    Ramona Ann Aaseby-Aguilera, PA-C  [facility] RICKY Al is a 30 year old who presents for the following health issues             History of Present Illness       Mikaela Vernon eats 0-1 servings of fruits and vegetables daily.Mikaela Vernon consumes 3 sweetened beverage(s) daily.Mikaela Vernon exercises with enough effort to increase Mikaela Vernon's heart rate 30 to 60 minutes per day.  Mikaela Vernon exercises with enough effort to increase Mikaela Vernon's heart rate 6 days per week.   Mikaela Vernon is taking medications regularly.       Hemorrhoids  Onset/Duration: Ongoing For 4 Months  Description:   Jacki-anal lump: no  Pain: YES- Burning   Itching: YES  Accompanying Signs & Symptoms:  Blood in stool: Blood Streaked Toilet Paper  Changes in stool pattern: no  History:       Hx of Hemorrhoids in the Past     Any previous GI studies done:none  Family History of colon cancer: YES- Father has Colon Cancer  Precipitating factors:   Defecating  Alleviating factors:  None  Therapies tried and outcome: anusol has not helped.     Also, Mikaela Vernon has additional complaints of dad had colon cancer diagnosed in 50s.  Mikaela states she had an episode of rectal bleeding, bright red blood in toilet and no stool. .      Review of Systems   Constitutional, HEENT, cardiovascular, pulmonary, gi and gu systems are negative, except as otherwise noted.      Objective    /80 (BP Location: Right arm, Patient Position: Chair, Cuff Size: Adult Regular)   Pulse 66   Temp 98.2  F (36.8  C) (Oral)   Resp 16   Ht 1.664 m (5' 5.5\")   Wt 59.5 kg (131 lb 1.6 oz)   SpO2 98%  "  BMI 21.48 kg/m    Body mass index is 21.48 kg/m .  Physical Exam   GENERAL: healthy, alert and no distress  RECTAL (female): multple hemorrhoids, non thrombosed hemorrhoids and skin tags

## 2021-06-17 NOTE — TELEPHONE ENCOUNTER
Refill Approved    Rx renewed per Medication Renewal Policy. Medication was last renewed on 2/18/21, last OV 3/1/21.    Daisy Callahan, [facility] Triage/Med Refill 5/15/2021     Requested Prescriptions   Pending Prescriptions Disp Refills     escitalopram oxalate (LEXAPRO) 5 MG tablet [Pharmacy Med Name: ESCITALOPRAM 5MG TABLETS] 30 tablet 2     Sig: TAKE 1 TABLET(5 MG) BY MOUTH DAILY       SSRI Refill Protocol  Passed - 5/15/2021  3:39 AM        Passed - PCP or prescribing provider visit in last year     Last office visit with prescriber/PCP: 3/1/2021 Meka Montemayor MD OR same dept: 3/1/2021 Meka Montemayor MD OR same specialty: 3/1/2021 Meka Montemayor MD  Last physical: Visit date not found Last MTM visit: Visit date not found   Next visit within 3 mo: Visit date not found  Next physical within 3 mo: Visit date not found  Prescriber OR PCP: Meka Montemayor MD  Last diagnosis associated with med order: 1. Moderate episode of recurrent major depressive disorder (H)  - escitalopram oxalate (LEXAPRO) 5 MG tablet [Pharmacy Med Name: ESCITALOPRAM 5MG TABLETS]; TAKE 1 TABLET(5 MG) BY MOUTH DAILY  Dispense: 30 tablet; Refill: 2    If protocol passes may refill for 12 months if within 3 months of last provider visit (or a total of 15 months).

## 2021-06-17 NOTE — TELEPHONE ENCOUNTER
Telephone Encounter by Audrey Garcia at 2/10/2021  8:13 AM     Author: Audrey Garcia Service: -- Author Type: --    Filed: 2/10/2021  8:14 AM Encounter Date: 2/8/2021 Status: Signed    : Audrey Garcia APPROVED:    Approval start date: 01/01/2021  Approval end date:  12/31/2021    Pharmacy has been notified of approval and will contact patient when medication is ready for pickup.
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
## 2021-06-17 NOTE — TELEPHONE ENCOUNTER
I did see this patient, please do not delete the note or encounter, for some reason there are 2 notes and my plan did not register.  I will look into later today and modify.      Thank you

## 2021-06-17 NOTE — PROGRESS NOTES
[facility] Daily Progress     Patient Name: Mikaela Vernon  Date: 5/1/2018  Visit #: 2  PTA visit #:    PRECAUTIONS: none  Referral Diagnosis:   726.61 (ICD-9-CM) - M70.50 (ICD-10-CM) - Anserine bursitis   726.5 (ICD-9-CM) - M70.61, M70.62 (ICD-10-CM) - Trochanteric bursitis of both hips   724.2, 338.29 (ICD-9-CM) - M54.5, G89.29 (ICD-10-CM) - Chronic bilateral low back pain, with sciatica presence unspecified   724.1, 338.29 (ICD-9-CM) - M54.6, G89.29 (ICD-10-CM) - Chronic bilateral thoracic back pain   729.5 (ICD-9-CM) - M79.604, M79.605 (ICD-10-CM) - Pain in both lower extremities      Referring provider: Elmer Thompson*  Visit Diagnosis:     ICD-10-CM    1. Chronic pain syndrome G89.4    2. Fibromyalgia M79.7    3. Generalized muscle weakness M62.81          Assessment:     Pt presents to PT with chronic widespread pain, worse in B LE, most consistent with fibromyalgia. She seeks PT primarily to help with pain control, along with to improve her energy levels for greater work and activity tolerance.  HEP/POC compliance is  good .  Patient demonstrates understanding/independence with home program.  Patient is appropriate to continue with skilled physical therapy intervention, as indicated by initial plan of care.    Goal Status:  Pt. will be independent with home exercise program in : 4 weeks  Pt. will have improved quality of sleep: waking less times/night;in 12 weeks;Comment  Comment:: and being able to get to sleep without difficulty consistently  Pt. will be able to walk : in 12 weeks;1 mile;Comment;for exercise/recreation  Comment:: with pain increase <= 1/10 on NPRS, without rest breaks  Patient will increase : LEFS score;in 12 weeks;for improved quality of life;by _ points  by ___ points: >= 9  Patient will decrease : CLIFF score;for improved quality of life;in 12 weeks;by _ points  by ___ points: >= 30% from IE  Patient will show increased : tolerance for ___;in 12 weeks  Patient  will show increased tolerance for : working full work day with pain increase <= 2/10 on NPRS.    Plan / Patient Education:     Continue with initial plan of care.  Progress with home program as tolerated, specifically, progressing core/LE strength as tolerated.    Subjective:     Pain Rating: About the same  Getting through HEP pretty regularly. Not too much pain with ex, although some get tiring.  Need to review the arm ex.    Objective:     Reviewed HEP with min cueing to ensure correct performance. Added bridges and stair heelraises.    Treatment Today     TREATMENT MINUTES COMMENTS   Evaluation     Self-care/ Home management     Manual therapy     Neuromuscular Re-education     Therapeutic Activity     Therapeutic Exercises 27 Ex per flow sheet   Gait training     Modality__________________                Total 27    Blank areas are intentional and mean the treatment did not include these items.       Adalid Suero   5/1/2018     [facility] Discharge Summary  Patient Name: Mikaela Vernon  Date: 6/12/2018  Referral Diagnosis:   726.61 (ICD-9-CM) - M70.50 (ICD-10-CM) - Anserine bursitis   726.5 (ICD-9-CM) - M70.61, M70.62 (ICD-10-CM) - Trochanteric bursitis of both hips   724.2, 338.29 (ICD-9-CM) - M54.5, G89.29 (ICD-10-CM) - Chronic bilateral low back pain, with sciatica presence unspecified   724.1, 338.29 (ICD-9-CM) - M54.6, G89.29 (ICD-10-CM) - Chronic bilateral thoracic back pain   729.5 (ICD-9-CM) - M79.604, M79.605 (ICD-10-CM) - Pain in both lower extremities      Referring provider: Elmer Thompson*  Visit Diagnosis:   1. Chronic pain syndrome     2. Fibromyalgia     3. Generalized muscle weakness         Goals:  Pt. will be independent with home exercise program in : 4 weeks. PROGRESSING  Pt. will have improved quality of sleep: waking less times/night;in 12 weeks;Comment  Comment:: and being able to get to sleep without difficulty consistently. PROGRESSING WITH HELP OF  MEDICATION  Pt. will be able to walk : in 12 weeks;1 mile;Comment;for exercise/recreation  Comment:: with pain increase <= 1/10 on NPRS, without rest breaks. PROGRESSING  Patient will increase : LEFS score;in 12 weeks;for improved quality of life;by _ points  by ___ points: >= 9. NOT ASSESSED  Patient will decrease : CLIFF score;for improved quality of life;in 12 weeks;by _ points  by ___ points: >= 30% from IE. NOT ASSSESSED  Patient will show increased : tolerance for ___;in 12 weeks  Patient will show increased tolerance for : working full work day with pain increase <= 2/10 on NPRS. PROGRESSING    Patient was seen for 2 visits from 4/17/18 to 5/1/18 with 0 missed appointments.  The patient attended therapy initially, but did not finish the therapy sessions prescribed.  Goals were not fully achieved.  Patient received a home program .  The patient discontinued therapy, did not return.    Therapy will be discontinued at this time.  The patient will need a new referral to resume.    Thank you for your referral.  Adalid Suero  6/12/2018  7:17 AM

## 2021-06-17 NOTE — PATIENT INSTRUCTIONS
(K64.4) External hemorrhoids  (primary encounter diagnosis)  Comment:   Plan: COLORECTAL SURGERY REFERRAL                (Z80.0) Family history of colon cancer  Comment: dad had colon cancer  Plan: GASTROENTEROLOGY ADULT REF PROCEDURE ONLY
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
## 2021-06-17 NOTE — PATIENT INSTRUCTIONS - HE
Summary of Your Rheumatology Visit    Next Appointment:   6 Months      Medications:    Please follow directives on pill bottle on how to take medication(s) provided.      Referrals:      Tests:     Recommend having liver enzymes checked this month.    Injections:        Other:
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
## 2021-06-18 NOTE — PROGRESS NOTES
Subjective: Patient comes in for evaluation this 27-year-old female has had lab test back in March for her chronic pain issues.  As part of that she had a thyroid test, by the rheumatologist    Her TSH was elevated at 85.98.  She states she has been taking her levothyroxine 100 mcg prior to that it was therapeutic.    I discussed with her and like to recheck it and TSH and free T4 were checked today.  She states she is continued to take the 100 mcg daily.    Patient also needed refill on birth control pill she takes that for dysmenorrhea.  That has been helpful for her.    Patient has had some ongoing fatigue possible fibromyalgia.  She has a follow-up with rheumatology next Tuesday.    No additional concerns at this time.    Tobacco status: She  reports that she is a non-smoker but has been exposed to tobacco smoke. She has never used smokeless tobacco.    Patient Active Problem List    Diagnosis Date Noted     Depression 01/31/2018     Anxiety 01/31/2018     Family history of fibromyalgia 01/31/2018     Hemorrhoid 01/31/2018     Autism 09/02/2016     Chronic pain 09/02/2016     Dental decay 09/02/2016     Eating disorder 09/02/2016     Recurrent major depressive episodes, in full remission 09/02/2016     PTSD (post-traumatic stress disorder) 09/02/2016     Hypothyroid 1990       Current Outpatient Prescriptions   Medication Sig Dispense Refill     desogestrel-ethinyl estradiol (ENSKYCE) 0.15-0.03 mg per tablet Take 1 tablet by mouth daily. 28 tablet 4     levothyroxine (SYNTHROID, LEVOTHROID) 100 MCG tablet Take 100 mcg by mouth daily.  2     MV,CALCIUM,MIN/IRON/FOLIC/VITK (MULTI FOR HER ORAL) Take by mouth.       tiZANidine (ZANAFLEX) 2 MG tablet Take 1-4 tablets qhs, start low-dose and gradually increase prn for muscle ache or insomnia, as tolerated. 90 tablet 2     clindamycin (CLEOCIN T) 1 % lotion Apply topically.       No current facility-administered medications for this visit.        ROS: 10 point  review of systems negative other than as outlined above    Objective:    BP 92/60 (Patient Site: Left Arm, Patient Position: Sitting, Cuff Size: Adult Regular)  Pulse (!) 58  Resp 12  Wt 129 lb (58.5 kg)  LMP 04/23/2018  SpO2 97%  BMI 21.47 kg/m2  Body mass index is 21.47 kg/(m^2).    General appearance no acute distress    HEENT: Neck without thyroid tenderness or fullness    Lungs are clear no rales or rhonchi, heart was regular S1-S2    Abdomen soft nontender    Extremities without edema skin was normal    Lab work from March 27 showed elevated CPK and other tests including the elevated TSH.    Recheck free T4 TSH today, this is pending.    Results for orders placed or performed in visit on 03/27/18   HM2(CBC w/o Differential)   Result Value Ref Range    WBC 5.2 4.0 - 11.0 thou/uL    RBC 4.11 3.80 - 5.40 mill/uL    Hemoglobin 12.9 12.0 - 16.0 g/dL    Hematocrit 36.9 35.0 - 47.0 %    MCV 90 80 - 100 fL    MCH 31.3 27.0 - 34.0 pg    MCHC 34.8 32.0 - 36.0 g/dL    RDW 11.9 11.0 - 14.5 %    Platelets 311 140 - 440 thou/uL    MPV 7.3 7.0 - 10.0 fL   Creatinine   Result Value Ref Range    Creatinine 0.78 0.60 - 1.10 mg/dL    GFR MDRD Af Amer >60 >60 mL/min/1.73m2    GFR MDRD Non Af Amer >60 >60 mL/min/1.73m2   ALT (SGPT)   Result Value Ref Range    ALT 13 0 - 45 U/L   AST (SGOT)   Result Value Ref Range    AST 19 0 - 40 U/L   Albumin   Result Value Ref Range    Albumin 3.7 3.5 - 5.0 g/dL   CCP Antibodies   Result Value Ref Range    CCP IgG Antibodies 5.0 (H) <=4.9 U/mL   Hepatitis C Antibody (Anti-HCV)   Result Value Ref Range    Hepatitis C Ab Negative Negative   Erythrocyte Sedimentation Rate   Result Value Ref Range    Sed Rate 10 0 - 20 mm/hr   C-Reactive Protein   Result Value Ref Range    CRP 0.3 0.0 - 0.8 mg/dL   HLA-B27 Antigen   Result Value Ref Range    HLA-B27 RESULT Negative Not Applicable    INTERPRETATION HLA-B27 antigen was not detected.    CK Total   Result Value Ref Range    CK, Total 390 (H) 30 -  190 U/L   Vitamin D, Total (25-Hydroxy)   Result Value Ref Range    Vitamin D, Total (25-Hydroxy) 62.4 30.0 - 80.0 ng/mL   Calcium   Result Value Ref Range    Calcium 9.5 8.5 - 10.5 mg/dL   Thyroid Stimulating Hormone (TSH)   Result Value Ref Range    TSH 85.98 (H) 0.30 - 5.00 uIU/mL       Assessment:  1. Hypothyroid  T4, Free    Thyroid Stimulating Hormone (TSH)   2. Family history of fibromyalgia     3. Chronic pain syndrome     4. Dysmenorrhea  desogestrel-ethinyl estradiol (ENSKYCE) 0.15-0.03 mg per tablet     Hypothyroid, will recheck levels, her weight stays about the same around 125- 130.    Chronic joint and muscle pain family history of fibromyalgia    Dysmenorrhea refill on her birth control    Plan: As outlined above    This transcription uses voice recognition software, which may contain typographical errors.

## 2021-06-19 NOTE — LETTER
Letter by Elmer Thompson DO at      Author: Elmer Thompson DO Service: -- Author Type: --    Filed:  Encounter Date: 6/28/2019 Status: (Other)         Mikaela Vernon  [address]  [place] [address]                              June 28, 2019    Patient: Mikaela Vernon   MR Number: [id number]   YOB: 1990   Date of Visit: 6/28/2019         To Whom It May Concern:     It is my medical opinion that Mikaela Vernon has a rheumatological condition that may be exacerbated  with prolonged standing therefore please consider permitting patient to use either a chair or a stool  while at work for the entirety of her shift, which should help minimize her symptoms.      If you need any additional information please feel free to contact us.      Sincerely,        Elmer Thompson DO

## 2021-06-19 NOTE — LETTER
Letter by Meka Montemayor MD at      Author: Meka Montemayor MD Service: -- Author Type: --    Filed:  Encounter Date: 6/3/2019 Status: (Other)         Katheryn 3, 2019     Patient: Mikaela Vernon   YOB: 1990   Date of Visit: 6/3/2019       To Whom It May Concern:     Mikaela Vernon is under my care. Please allow Mikaela consume nutritional drinks besides water at work due to a medical condition.    If you have any questions or concerns, please don't hesitate to call.    Sincerely,        Electronically signed by Meka Montemayor MD

## 2021-06-19 NOTE — LETTER
Letter by Elmer Thompson DO at      Author: Elmer Thompson DO Service: -- Author Type: --    Filed:  Encounter Date: 5/9/2019 Status: (Other)         Mikaela HARJINDER Saulo  [address]  [place] [address]             May 9, 2019         Dear  Saulo,    Below are the results from your recent visit:    Resulted Orders   XR Sacroliac Joints 3 Or More VWS    Narrative    EXAM: XR SACROILIAC JOINTS 3 OR MORE VWS  LOCATION: [facility]  DATE/TIME: 5/1/2019 11:26 AM    INDICATION: Low back pain  COMPARISON: None.    FINDINGS: Negative SI joints. No erosions.        FINDINGS: Negative/unremarkable sacroiliac joints. No erosions.      Sincerely,    Electronically signed by Elmer Thompson DO

## 2021-06-19 NOTE — LETTER
Letter by Elmer Thompson DO at      Author: Elmer Thompson DO Service: -- Author Type: --    Filed:  Encounter Date: 5/9/2019 Status: (Other)         Mikaela Vernon  [address]  [place] [address]             May 9, 2019         Dear  Saulo,    Below are the results from your recent visit:    Resulted Orders   XR Thoracic Spine 3 VWS    Narrative    EXAM: XR THORACIC SPINE 3 VWS  LOCATION: [facility]  DATE/TIME: 5/1/2019 11:25 AM    INDICATION: Low back pain  COMPARISON: None.    FINDINGS: Mild convex right thoracic curvature. Minimal degenerative disc space narrowing and interbody spurring in the midthoracic spine. No erosions. No fracture or subluxation.      FINDINGS: Mild convex right thoracic curvature. Minimal degenerative disc space narrowing and interbody spurring in the midthoracic spine. No erosions. No fracture or subluxation.     Findings of minimal degenerative disks space narrowing and spurring are consistent with having minimal signs of Osteoarthritis. Osteoarthritis represents gradual wearing down with time of the protective cartilage in a joint.    Sincerely,      Electronically signed by Elmer Thompson DO

## 2021-06-19 NOTE — LETTER
Letter by Meka Montemayor MD at      Author: Meka Montemayor MD Service: -- Author Type: --    Filed:  Encounter Date: 9/24/2019 Status: Signed       Mikaela HARJINDER Vernon  [address]  [place] [address]    September 24, 2019    Dear Mikaela    In reviewing your records, we have determined a gap in your preventive services. Based on your age and health history, we recommend the follow service.     ? General Physical  ? Physical with a Pap Smear  ? Colon cancer screening  ? Mammogram  ? Immunization  ? Diabetic check  ? Blood pressure/cardiovascular check  ? Asthma check  ? Cholesterol test  ? Lab work  ? Med check    If you have had the service elsewhere, please contact us so we can update our records. Please let us know if you have transferred your care to another clinic.    Please call [phone] to schedule this appointment.    We believe that a strong preventive care program, including regular physicals and follow-up care is an important part of a healthy lifestyle and we are committed to helping you maintain your health.    Thank you for choosing us as your health care provider.    Sincerely,   [facility]  [address] [facility] [address]  Phone Number:  [phone]

## 2021-06-19 NOTE — LETTER
Letter by Elmer Thompson DO at      Author: Elmer Thompson DO Service: -- Author Type: --    Filed:  Encounter Date: 5/9/2019 Status: (Other)         Mikaela Vernon  [address]  [place] [address]             May 9, 2019         Dear  Saulo,    Below are the results from your recent visit:    Resulted Orders   XR Lumbar Spine 2 or 3 VWS    Narrative    EXAM: XR LUMBAR SPINE 2 OR 3 VWS  LOCATION: [facility]  DATE/TIME: 5/1/2019 11:24 AM    INDICATION: Low back pain  COMPARISON: None.    FINDINGS: Negative lumbar spine. No fracture or subluxation. No disc space narrowing. Transitional partially lumbarized S1 segment.     FINDINGS: Negative lumbar spine. No fracture or subluxation. No disc space narrowing. Transitional partially lumbarized S1 segment.     Regarding finding of partially lumbarized S1 segment: Beneath the lumbar vertebrae is the sacrum, which has five vertebrae that are typically already fused to each other. Sometimes, the top of the sacrum, that is, S1 (first sacral vertebrae) can be separate from the rest of the sacrum and can appear to be a part of the lumbar spine.     Findings need to be correlated clinically, can discuss more extensively over future visit.     Sincerely,      Electronically signed by Elmer Thompson DO

## 2021-06-20 NOTE — LETTER
Letter by Meka Montemayor MD at      Author: Meka Montemayor MD Service: -- Author Type: --    Filed:  Encounter Date: 1/27/2020 Status: (Other)         Mikaela AD Saulo  [address]  [place] [address]             January 31, 2020         Dear   Saulo,    I will not be able to refill your Thyroid medication again until you have blood work done. The blood work will need to be done while on Synthroid, so I can see if levels need to be adjusted. If you want me to manage your Synthroid, you will need to have blood levels checked minimum of once a year (orders are in, you will need to call [phone] and schedule a lab only). Thank you!    Please call with questions or contact us using [employer].    Sincerely,        Electronically signed by Meka Montemayor MD
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
## 2021-06-21 ENCOUNTER — COMMUNICATION - HEALTHEAST (OUTPATIENT)
Dept: RHEUMATOLOGY | Facility: CLINIC | Age: 31
End: 2021-06-21

## 2021-06-21 DIAGNOSIS — M79.7 FIBROMYALGIA: ICD-10-CM
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
## 2021-06-23 ENCOUNTER — VIRTUAL VISIT (OUTPATIENT)
Dept: PSYCHOLOGY | Facility: CLINIC | Age: 31
End: 2021-06-23
Payer: COMMERCIAL

## 2021-06-23 DIAGNOSIS — F50.9 EATING DISORDER, UNSPECIFIED TYPE: ICD-10-CM

## 2021-06-23 DIAGNOSIS — F41.9 ANXIETY DISORDER, UNSPECIFIED TYPE: Primary | ICD-10-CM

## 2021-06-23 DIAGNOSIS — F84.0 AUTISM SPECTRUM DISORDER: ICD-10-CM

## 2021-06-23 PROCEDURE — 90834 PSYTX W PT 45 MINUTES: CPT | Mod: 95 | Performed by: SOCIAL WORKER

## 2021-06-23 RX ORDER — HYDROCORTISONE ACETATE 25 MG/1
25 SUPPOSITORY RECTAL AT BEDTIME
Qty: 30 SUPPOSITORY | Refills: 0 | Status: SHIPPED | OUTPATIENT
Start: 2021-06-23 | End: 2021-07-21

## 2021-06-23 ASSESSMENT — ANXIETY QUESTIONNAIRES
GAD7 TOTAL SCORE: 5
GAD7 TOTAL SCORE: 5
3. WORRYING TOO MUCH ABOUT DIFFERENT THINGS: SEVERAL DAYS
5. BEING SO RESTLESS THAT IT IS HARD TO SIT STILL: NOT AT ALL
4. TROUBLE RELAXING: SEVERAL DAYS
7. FEELING AFRAID AS IF SOMETHING AWFUL MIGHT HAPPEN: SEVERAL DAYS
7. FEELING AFRAID AS IF SOMETHING AWFUL MIGHT HAPPEN: SEVERAL DAYS
2. NOT BEING ABLE TO STOP OR CONTROL WORRYING: SEVERAL DAYS
6. BECOMING EASILY ANNOYED OR IRRITABLE: NOT AT ALL
1. FEELING NERVOUS, ANXIOUS, OR ON EDGE: SEVERAL DAYS
GAD7 TOTAL SCORE: 5

## 2021-06-23 ASSESSMENT — PATIENT HEALTH QUESTIONNAIRE - PHQ9
10. IF YOU CHECKED OFF ANY PROBLEMS, HOW DIFFICULT HAVE THESE PROBLEMS MADE IT FOR YOU TO DO YOUR WORK, TAKE CARE OF THINGS AT HOME, OR GET ALONG WITH OTHER PEOPLE: SOMEWHAT DIFFICULT
SUM OF ALL RESPONSES TO PHQ QUESTIONS 1-9: 4
SUM OF ALL RESPONSES TO PHQ QUESTIONS 1-9: 4
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
## 2021-06-23 NOTE — TELEPHONE ENCOUNTER
Refill Approved    Rx renewed per Medication Renewal Policy. Medication was last renewed on 10/9/18.    Tricia Oneil, [facility] Triage/Med Refill 2/11/2019     Requested Prescriptions   Pending Prescriptions Disp Refills     levothyroxine (SYNTHROID, LEVOTHROID) 112 MCG tablet [Pharmacy Med Name: LEVOTHYROXINE 0.112MG (112MCG) TABS] 30 tablet 0     Sig: TAKE 1 TABLET BY MOUTH DAILY    Thyroid Hormones Protocol Passed - 2/8/2019 10:24 AM       Passed - Provider visit in past 12 months or next 3 months    Last office visit with prescriber/PCP: 5/22/2018 Ryan Coleman MD OR same dept: 5/22/2018 Ryan Coleman MD OR same specialty: 5/22/2018 Ryan Coleman MD  Last physical: Visit date not found Last MTM visit: Visit date not found   Next visit within 3 mo: Visit date not found  Next physical within 3 mo: Visit date not found  Prescriber OR PCP: Ryan Coleman MD  Last diagnosis associated with med order: 1. Hypothyroid  - levothyroxine (SYNTHROID, LEVOTHROID) 112 MCG tablet [Pharmacy Med Name: LEVOTHYROXINE 0.112MG (112MCG) TABS]; TAKE 1 TABLET BY MOUTH DAILY  Dispense: 30 tablet; Refill: 0    If protocol passes may refill for 12 months if within 3 months of last provider visit (or a total of 15 months).            Passed - TSH on file in past 12 months for patient age 12 & older    TSH   Date Value Ref Range Status   11/08/2018 0.91 0.30 - 5.00 uIU/mL Final

## 2021-06-23 NOTE — TELEPHONE ENCOUNTER
Last ov-  8/30/18  Last labs- 9/17/18    Future appt- 5/28/19    Not on RN refill protocol, please advise

## 2021-06-24 ASSESSMENT — ANXIETY QUESTIONNAIRES: GAD7 TOTAL SCORE: 5

## 2021-06-24 ASSESSMENT — PATIENT HEALTH QUESTIONNAIRE - PHQ9: SUM OF ALL RESPONSES TO PHQ QUESTIONS 1-9: 4

## 2021-06-25 NOTE — TELEPHONE ENCOUNTER
Please mention to the patient:    Liver enzymes performed at [place] on May 13, 2021 were within normal limits.    Recommend rechecking creatinine/kidney function and liver enzymes (AST/ALT) just prior to next visit in about 6 months.    Please place standing order for above labs for every 4 to 6 months, thanks.

## 2021-07-03 ENCOUNTER — COMMUNICATION - HEALTHEAST (OUTPATIENT)
Dept: INTERNAL MEDICINE | Facility: CLINIC | Age: 31
End: 2021-07-03

## 2021-07-03 DIAGNOSIS — M79.7 FIBROMYALGIA: ICD-10-CM

## 2021-07-03 NOTE — ADDENDUM NOTE
Addendum Note by Lila Davison DO at 3/27/2018  9:18 PM     Author: Lila Davison DO Service: -- Author Type: Physician    Filed: 3/27/2018  9:18 PM Encounter Date: 3/27/2018 Status: Signed    : Lila Davison DO (Physician)    Addended by: LILA DAVISON on: 3/27/2018 09:18 PM        Modules accepted: Orders

## 2021-07-04 NOTE — TELEPHONE ENCOUNTER
Telephone Encounter by Daisy Callahan, RN at 7/3/2021  5:45 AM     Author: Daisy Callahan RN Service: -- Author Type: Registered Nurse    Filed: 7/3/2021  5:45 AM Encounter Date: 7/3/2021 Status: Signed    : Daisy Callahan RN (Registered Nurse)       RN cannot approve Refill Request    RN can NOT refill this medication med is not covered by policy/route to provider. Last office visit: 3/1/2021 Meka Montemayor MD Last Physical: Visit date not found Last MTM visit: Visit date not found Last visit same specialty: 3/1/2021 Meka Montemayor MD.  Next visit within 3 mo: Visit date not found  Next physical within 3 mo: Visit date not found      Daisy Callahan, [facility] Triage/Med Refill 7/3/2021    Requested Prescriptions   Pending Prescriptions Disp Refills   ? tiZANidine (ZANAFLEX) 4 MG tablet [Pharmacy Med Name: TIZANIDINE 4MG TABLETS] 90 tablet 1     Sig: TAKE 3 TABLETS BY MOUTH AT BEDTIME       There is no refill protocol information for this order

## 2021-07-04 NOTE — ADDENDUM NOTE
Addendum Note by Josiah Montemayor MD at 5/3/2021  3:34 PM     Author: Josiah Montemayor MD Service: -- Author Type: Physician    Filed: 5/3/2021  3:34 PM Encounter Date: 4/26/2021 Status: Signed    : Josiah Montemayor MD (Physician)    Addended by: JOSIAH MONTEMAYOR on: 5/3/2021 03:34 PM        Modules accepted: Orders

## 2021-07-05 ENCOUNTER — COMMUNICATION - HEALTHEAST (OUTPATIENT)
Dept: MIDWIFE SERVICES | Facility: CLINIC | Age: 31
End: 2021-07-05

## 2021-07-05 DIAGNOSIS — Z31.83 PROCREATIVE MANAGEMENT FOR ASSISTED FERTILITY PROCEDURE CYCLE: Primary | ICD-10-CM

## 2021-07-06 DIAGNOSIS — Z31.83 PROCREATIVE MANAGEMENT FOR ASSISTED FERTILITY PROCEDURE CYCLE: ICD-10-CM

## 2021-07-06 LAB
ABO + RH BLD: NORMAL
ABO + RH BLD: NORMAL
BASOPHILS # BLD AUTO: 0 10E9/L (ref 0–0.2)
BASOPHILS NFR BLD AUTO: 0.2 %
BLD GP AB SCN SERPL QL: NORMAL
BLOOD BANK CMNT PATIENT-IMP: NORMAL
DIFFERENTIAL METHOD BLD: NORMAL
EOSINOPHIL # BLD AUTO: 0.1 10E9/L (ref 0–0.7)
EOSINOPHIL NFR BLD AUTO: 2 %
ERYTHROCYTE [DISTWIDTH] IN BLOOD BY AUTOMATED COUNT: 11.6 % (ref 10–15)
HBA1C MFR BLD: 5.3 % (ref 0–5.6)
HCT VFR BLD AUTO: 40.5 % (ref 35–47)
HGB BLD-MCNC: 13.4 G/DL (ref 11.7–15.7)
LYMPHOCYTES # BLD AUTO: 2.2 10E9/L (ref 0.8–5.3)
LYMPHOCYTES NFR BLD AUTO: 44.2 %
MCH RBC QN AUTO: 30.9 PG (ref 26.5–33)
MCHC RBC AUTO-ENTMCNC: 33.1 G/DL (ref 31.5–36.5)
MCV RBC AUTO: 94 FL (ref 78–100)
MISCELLANEOUS TEST: NORMAL
MONOCYTES # BLD AUTO: 0.5 10E9/L (ref 0–1.3)
MONOCYTES NFR BLD AUTO: 10.5 %
NEUTROPHILS # BLD AUTO: 2.1 10E9/L (ref 1.6–8.3)
NEUTROPHILS NFR BLD AUTO: 43.1 %
PLATELET # BLD AUTO: 320 10E9/L (ref 150–450)
RBC # BLD AUTO: 4.33 10E12/L (ref 3.8–5.2)
SPECIMEN EXP DATE BLD: NORMAL
WBC # BLD AUTO: 5 10E9/L (ref 4–11)

## 2021-07-06 PROCEDURE — 83520 IMMUNOASSAY QUANT NOS NONAB: CPT | Mod: 90 | Performed by: OBSTETRICS & GYNECOLOGY

## 2021-07-06 PROCEDURE — 87389 HIV-1 AG W/HIV-1&-2 AB AG IA: CPT | Performed by: OBSTETRICS & GYNECOLOGY

## 2021-07-06 PROCEDURE — 86787 VARICELLA-ZOSTER ANTIBODY: CPT | Performed by: OBSTETRICS & GYNECOLOGY

## 2021-07-06 PROCEDURE — 84144 ASSAY OF PROGESTERONE: CPT | Performed by: OBSTETRICS & GYNECOLOGY

## 2021-07-06 PROCEDURE — 81220 CFTR GENE COM VARIANTS: CPT | Performed by: OBSTETRICS & GYNECOLOGY

## 2021-07-06 PROCEDURE — 86644 CMV ANTIBODY: CPT | Performed by: OBSTETRICS & GYNECOLOGY

## 2021-07-06 PROCEDURE — 84270 ASSAY OF SEX HORMONE GLOBUL: CPT | Performed by: OBSTETRICS & GYNECOLOGY

## 2021-07-06 PROCEDURE — 81400 MOPATH PROCEDURE LEVEL 1: CPT | Performed by: OBSTETRICS & GYNECOLOGY

## 2021-07-06 PROCEDURE — 82670 ASSAY OF TOTAL ESTRADIOL: CPT | Performed by: OBSTETRICS & GYNECOLOGY

## 2021-07-06 PROCEDURE — 86900 BLOOD TYPING SEROLOGIC ABO: CPT | Performed by: OBSTETRICS & GYNECOLOGY

## 2021-07-06 PROCEDURE — 87340 HEPATITIS B SURFACE AG IA: CPT | Performed by: OBSTETRICS & GYNECOLOGY

## 2021-07-06 PROCEDURE — 84702 CHORIONIC GONADOTROPIN TEST: CPT | Performed by: OBSTETRICS & GYNECOLOGY

## 2021-07-06 PROCEDURE — 36415 COLL VENOUS BLD VENIPUNCTURE: CPT | Performed by: OBSTETRICS & GYNECOLOGY

## 2021-07-06 PROCEDURE — 84146 ASSAY OF PROLACTIN: CPT | Performed by: OBSTETRICS & GYNECOLOGY

## 2021-07-06 PROCEDURE — 86780 TREPONEMA PALLIDUM: CPT | Mod: 90 | Performed by: OBSTETRICS & GYNECOLOGY

## 2021-07-06 PROCEDURE — 83001 ASSAY OF GONADOTROPIN (FSH): CPT | Performed by: OBSTETRICS & GYNECOLOGY

## 2021-07-06 PROCEDURE — 83002 ASSAY OF GONADOTROPIN (LH): CPT | Performed by: OBSTETRICS & GYNECOLOGY

## 2021-07-06 PROCEDURE — 84403 ASSAY OF TOTAL TESTOSTERONE: CPT | Mod: 90 | Performed by: OBSTETRICS & GYNECOLOGY

## 2021-07-06 PROCEDURE — G0452 MOLECULAR PATHOLOGY INTERPR: HCPCS | Performed by: PATHOLOGY

## 2021-07-06 PROCEDURE — 86762 RUBELLA ANTIBODY: CPT | Performed by: OBSTETRICS & GYNECOLOGY

## 2021-07-06 PROCEDURE — 81243 FMR1 GEN ALY DETC ABNL ALLEL: CPT | Performed by: OBSTETRICS & GYNECOLOGY

## 2021-07-06 PROCEDURE — 83036 HEMOGLOBIN GLYCOSYLATED A1C: CPT | Performed by: OBSTETRICS & GYNECOLOGY

## 2021-07-06 PROCEDURE — 99000 SPECIMEN HANDLING OFFICE-LAB: CPT | Performed by: OBSTETRICS & GYNECOLOGY

## 2021-07-06 PROCEDURE — 80050 GENERAL HEALTH PANEL: CPT | Performed by: OBSTETRICS & GYNECOLOGY

## 2021-07-06 PROCEDURE — 82306 VITAMIN D 25 HYDROXY: CPT | Performed by: OBSTETRICS & GYNECOLOGY

## 2021-07-06 PROCEDURE — 86850 RBC ANTIBODY SCREEN: CPT | Performed by: OBSTETRICS & GYNECOLOGY

## 2021-07-06 PROCEDURE — 86803 HEPATITIS C AB TEST: CPT | Performed by: OBSTETRICS & GYNECOLOGY

## 2021-07-06 PROCEDURE — 86901 BLOOD TYPING SEROLOGIC RH(D): CPT | Performed by: OBSTETRICS & GYNECOLOGY

## 2021-07-06 ASSESSMENT — ANXIETY QUESTIONNAIRES
GAD7 TOTAL SCORE: 5
6. BECOMING EASILY ANNOYED OR IRRITABLE: NOT AT ALL
1. FEELING NERVOUS, ANXIOUS, OR ON EDGE: SEVERAL DAYS
4. TROUBLE RELAXING: SEVERAL DAYS
7. FEELING AFRAID AS IF SOMETHING AWFUL MIGHT HAPPEN: SEVERAL DAYS
3. WORRYING TOO MUCH ABOUT DIFFERENT THINGS: SEVERAL DAYS
GAD7 TOTAL SCORE: 5
2. NOT BEING ABLE TO STOP OR CONTROL WORRYING: SEVERAL DAYS
7. FEELING AFRAID AS IF SOMETHING AWFUL MIGHT HAPPEN: SEVERAL DAYS
GAD7 TOTAL SCORE: 5
5. BEING SO RESTLESS THAT IT IS HARD TO SIT STILL: NOT AT ALL

## 2021-07-07 ENCOUNTER — COMMUNICATION - HEALTHEAST (OUTPATIENT)
Dept: INTERNAL MEDICINE | Facility: CLINIC | Age: 31
End: 2021-07-07

## 2021-07-07 ENCOUNTER — VIRTUAL VISIT (OUTPATIENT)
Dept: PSYCHOLOGY | Facility: CLINIC | Age: 31
End: 2021-07-07
Payer: COMMERCIAL

## 2021-07-07 DIAGNOSIS — F41.9 ANXIETY DISORDER, UNSPECIFIED TYPE: Primary | ICD-10-CM

## 2021-07-07 DIAGNOSIS — F50.9 EATING DISORDER, UNSPECIFIED TYPE: ICD-10-CM

## 2021-07-07 DIAGNOSIS — F84.0 AUTISM SPECTRUM DISORDER: ICD-10-CM

## 2021-07-07 LAB
ALBUMIN SERPL-MCNC: 3.7 G/DL (ref 3.4–5)
ALP SERPL-CCNC: 107 U/L (ref 40–150)
ALT SERPL W P-5'-P-CCNC: 18 U/L (ref 0–50)
ANION GAP SERPL CALCULATED.3IONS-SCNC: 8 MMOL/L (ref 3–14)
AST SERPL W P-5'-P-CCNC: 11 U/L (ref 0–45)
B-HCG SERPL-ACNC: <1 IU/L (ref 0–5)
BILIRUB SERPL-MCNC: 0.2 MG/DL (ref 0.2–1.3)
BUN SERPL-MCNC: 10 MG/DL (ref 7–30)
CALCIUM SERPL-MCNC: 8.9 MG/DL (ref 8.5–10.1)
CHLORIDE SERPL-SCNC: 103 MMOL/L (ref 94–109)
CO2 SERPL-SCNC: 26 MMOL/L (ref 20–32)
CREAT SERPL-MCNC: 0.83 MG/DL (ref 0.52–1.04)
DEPRECATED CALCIDIOL+CALCIFEROL SERPL-MC: 77 UG/L (ref 20–75)
ESTRADIOL SERPL-MCNC: <11 PG/ML
FSH SERPL-ACNC: 0.2 IU/L
GFR SERPL CREATININE-BSD FRML MDRD: >90 ML/MIN/{1.73_M2}
GLUCOSE SERPL-MCNC: 88 MG/DL (ref 70–99)
HBV SURFACE AG SERPL QL IA: NONREACTIVE
HCV AB SERPL QL IA: NONREACTIVE
HIV 1+2 AB+HIV1 P24 AG SERPL QL IA: NONREACTIVE
LH SERPL-ACNC: <0.2 IU/L
Lab: NORMAL
POTASSIUM SERPL-SCNC: 3.6 MMOL/L (ref 3.4–5.3)
PROGEST SERPL-MCNC: 0.2 NG/ML
PROLACTIN SERPL-MCNC: 4 UG/L (ref 3–27)
PROT SERPL-MCNC: 7.9 G/DL (ref 6.8–8.8)
SODIUM SERPL-SCNC: 137 MMOL/L (ref 133–144)
T PALLIDUM AB SER QL: NONREACTIVE
TSH SERPL DL<=0.005 MIU/L-ACNC: 0.19 MU/L (ref 0.4–4)

## 2021-07-07 PROCEDURE — 90832 PSYTX W PT 30 MINUTES: CPT | Mod: 95 | Performed by: SOCIAL WORKER

## 2021-07-07 ASSESSMENT — ANXIETY QUESTIONNAIRES: GAD7 TOTAL SCORE: 5

## 2021-07-07 ASSESSMENT — PATIENT HEALTH QUESTIONNAIRE - PHQ9: SUM OF ALL RESPONSES TO PHQ QUESTIONS 1-9: 4
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
## 2021-07-08 LAB
CMV IGG SERPL QL IA: 0.5 AI (ref 0–0.8)
MIS SERPL-MCNC: 3.89 NG/ML (ref 0.18–11.71)
RUBV IGG SERPL IA-ACNC: 145 IU/ML
SHBG SERPL-SCNC: 141 NMOL/L (ref 30–135)
TESTOST FREE SERPL-MCNC: 0.03 NG/DL (ref 0.08–0.74)
TESTOST SERPL-MCNC: 12 NG/DL (ref 8–60)
VZV IGG SER QL IA: 3.8 AI (ref 0–0.8)

## 2021-07-13 LAB — LAB SCANNED RESULT: NORMAL

## 2021-07-15 LAB — COPATH REPORT: NORMAL

## 2021-07-16 LAB — LAB SCANNED RESULT: NORMAL

## 2021-07-19 NOTE — PROGRESS NOTES
"S: Mikaela presents to [facility] clinic for evaluation of vaginal pain, possible vaginismus. She is attempting IVF and called a few days ago and is hoping for strategies to address vaginal pain.    Patient states that she is with the fertility clinic in [place] she brings with her today papers indicating she would like to have some of her testing done locally, wondering if we will be able to do a hysterosalpingogram and a sonohysterogram and transvaginal ultrasounds.    Patient states that what brings her in today is a curiosity about passable vaginismus.  She states that she tried to have a transvaginal ultrasound recently but it was excruciatingly painful and she could not follow through with this test.  She is wondering what her options are.   Patient states that she has never had sexual intercourse and has never had anything penetrate the vagina other than for her annual exam.  But she states \"that was extremely painful.\"  Patient has tolerated a speculum exam with another midwife but states that otherwise she has not had anything inside of her vagina other than a tampon, which she can place herself or she can tolerate her finger penetrating a short way.  Patient states that the idea of anything penetrating her vagina is\" very frightening\" to her.  When discussing vaginal dilators and using lidocaine gel, patient said this \"sounds very scary\" to her.  Patient also states that she needs extra sedation when she is having oral surgery done.  Per pt, Valium does not work on her.  It has been suggested to her that she have most of her testing/procedures done under sedation if possible.  5% lidocaine gel could also be used.  Wondering if pelvic floor therapy, biofeedback, sedation and/or hypnotherapy could be tried.  Patient states that she has a therapist but that she has not discussed this issue with them.  Patient says she wants a big family, 4 children if possible, so she knows she needs to address these issues to " "move forward with her fertility clinic.Pt states that the idea of having a baby, in terms of the pregnancy and birth isn't worrisome to her. \"I don't have a problem with anything coming out, it's just I don't want anything going in.\"    Physical exam:  External genitalia normal by inspection  No lesions, no discharge noted, no irritation noted.  Deep pressure pelvic area is not tolerated at all.  Pt jumped and expressed discomfort when pressure put on her bony pelvic structures including her sitz bones   Light pressure is not tolerated over lower half of the vagina, upper half doesn't seem to bother her.  With a Q-tip--patient experiences discomfort at the introitus, lower half of labia minora.  No discomfort or abnormality of clitoral randolph, upper areas of labia majora, or urethral area.    Patient could not tolerate digital vaginal exam today. Very jumpy and hesitant. Not tolerating anything into the vagina, muscles clenched/tight.    Plan: 1.  Referral to [facility] for discussion of plan and possible HSG, sonohysterogram, and if needed transvaginal ultrasounds to monitor ovarian stimulation. And how this would be accomplished with sedation if possible.  2.  Discussed options to reduce vaginal discomfort with procedures and suggest that she get further direction from OB.  Consider 5% lidocaine gel applied to the vagina ahead of time for vaginal procedures.  And appropriate doses of Valium could be considered as well.  3.  Encourage patient to discuss issues with her therapist for referral to a sex therapist or therapist experienced in pelvic pain issues.  4.  Early prenatal care with the [facility] midwives as appropriate when/if pregnancy is achieved.    Paula LIRIANO CNM  Total time with patient 45 minutes Greater than 50% of time spent with patient was counseling, education and coordination of care.      "

## 2021-07-21 ENCOUNTER — MEDICAL CORRESPONDENCE (OUTPATIENT)
Dept: HEALTH INFORMATION MANAGEMENT | Facility: CLINIC | Age: 31
End: 2021-07-21

## 2021-07-21 ENCOUNTER — OFFICE VISIT (OUTPATIENT)
Dept: MIDWIFE SERVICES | Facility: CLINIC | Age: 31
End: 2021-07-21
Payer: COMMERCIAL

## 2021-07-21 VITALS
SYSTOLIC BLOOD PRESSURE: 128 MMHG | HEART RATE: 77 BPM | HEIGHT: 66 IN | OXYGEN SATURATION: 97 % | WEIGHT: 133 LBS | BODY MASS INDEX: 21.38 KG/M2 | DIASTOLIC BLOOD PRESSURE: 78 MMHG

## 2021-07-21 DIAGNOSIS — R10.2 VAGINAL PAIN: Primary | ICD-10-CM

## 2021-07-21 DIAGNOSIS — Z31.83 PATIENT UNDERGOING IN VITRO FERTILIZATION: ICD-10-CM

## 2021-07-21 PROCEDURE — 99215 OFFICE O/P EST HI 40 MIN: CPT | Performed by: MIDWIFE

## 2021-07-21 RX ORDER — ESCITALOPRAM OXALATE 10 MG/1
10 TABLET ORAL
COMMUNITY
Start: 2021-06-15 | End: 2022-01-21

## 2021-07-21 RX ORDER — GABAPENTIN 100 MG/1
CAPSULE ORAL
COMMUNITY
Start: 2021-06-21 | End: 2022-03-03

## 2021-07-21 RX ORDER — TIMOLOL MALEATE 5 MG/ML
1 SOLUTION/ DROPS OPHTHALMIC DAILY
COMMUNITY
Start: 2021-05-03 | End: 2022-01-14

## 2021-07-21 RX ORDER — ALBUTEROL SULFATE 90 UG/1
AEROSOL, METERED RESPIRATORY (INHALATION)
COMMUNITY
Start: 2020-02-19

## 2021-07-21 RX ORDER — LEVOTHYROXINE SODIUM 112 UG/1
TABLET ORAL
COMMUNITY
Start: 2021-07-14 | End: 2021-09-19

## 2021-07-21 ASSESSMENT — MIFFLIN-ST. JEOR: SCORE: 1332.09

## 2021-08-02 ENCOUNTER — TRANSFERRED RECORDS (OUTPATIENT)
Dept: HEALTH INFORMATION MANAGEMENT | Facility: CLINIC | Age: 31
End: 2021-08-02

## 2021-08-02 ENCOUNTER — NURSE TRIAGE (OUTPATIENT)
Dept: NURSING | Facility: CLINIC | Age: 31
End: 2021-08-02

## 2021-08-02 NOTE — TELEPHONE ENCOUNTER
Patient had rubber banding of one internal hemorrhoid at 2:30pm. Patient had a normal bowel movement but then afterward had an episode of almost fainting about 45 minutes ago. Patient now having a lot of pain of 7-8/10 from rectum and pelvis and radiates to abdomen and left leg to the toes. Dizziness remains now mild. Patient has been drinking.   Denies bleeding, dehydration.   Protocol directs to ED or PCP triage. Paging on call provider for pcp office for second level triage.   Page to Dr. Moshe Emmanuel at 7:02 pm  Dr. Emmanuel paging back at 7:09 directing to have patient page provider who performed the procedure.   Return call to patient at 7:22pm.   Relayed message from Dr. Emmanuel that patient should be calling the provider who performed the procedure. Assisted patient in finding provider phone number. Patient will call and ask to speak with on call provider regarding symptoms.   Rosa Pierre RN   08/02/21 7:25 PM  [facility] Nurse Advisor    Reason for Disposition    Patient sounds very sick or weak to the triager    Additional Information    Negative: Sounds like a life-threatening emergency to the triager    Negative: Chest pain    Negative: Difficulty breathing    Negative: Acting confused (e.g., disoriented, slurred speech) or excessively sleepy    Negative: Surgical incision symptoms and questions    Negative: [1] Discomfort (pain, burning or stinging) when passing urine AND [2] male    Negative: [1] Discomfort (pain, burning or stinging) when passing urine AND [2] female    Negative: Constipation    Negative: New or worsening leg (calf, thigh) pain    Negative: New or worsening leg swelling    Negative: Dizziness is severe, or persists > 24 hours after surgery    Negative: Pain, redness, swelling, or pus at IV Site    Negative: Symptoms arising from use of a urinary catheter (Meyer or Coude)    Negative: Cast problems or questions    Negative: Medication question    Negative: [1] Widespread rash AND [2]  bright red, sunburn-like    Negative: [1] SEVERE headache AND [2] after spinal (epidural) anesthesia    Negative: [1] Vomiting AND [2] persists > 4 hours    Negative: [1] Vomiting AND [2] abdomen looks much more swollen than usual    Negative: [1] Drinking very little AND [2] dehydration suspected (e.g., no urine > 12 hours, very dry mouth, very lightheaded)    Protocols used: POST-OP SYMPTOMS AND [id number]

## 2021-08-10 ASSESSMENT — PATIENT HEALTH QUESTIONNAIRE - PHQ9
10. IF YOU CHECKED OFF ANY PROBLEMS, HOW DIFFICULT HAVE THESE PROBLEMS MADE IT FOR YOU TO DO YOUR WORK, TAKE CARE OF THINGS AT HOME, OR GET ALONG WITH OTHER PEOPLE: SOMEWHAT DIFFICULT
SUM OF ALL RESPONSES TO PHQ QUESTIONS 1-9: 3
SUM OF ALL RESPONSES TO PHQ QUESTIONS 1-9: 3

## 2021-08-10 ASSESSMENT — ANXIETY QUESTIONNAIRES
5. BEING SO RESTLESS THAT IT IS HARD TO SIT STILL: NOT AT ALL
GAD7 TOTAL SCORE: 3
7. FEELING AFRAID AS IF SOMETHING AWFUL MIGHT HAPPEN: NOT AT ALL
4. TROUBLE RELAXING: SEVERAL DAYS
2. NOT BEING ABLE TO STOP OR CONTROL WORRYING: SEVERAL DAYS
GAD7 TOTAL SCORE: 3
7. FEELING AFRAID AS IF SOMETHING AWFUL MIGHT HAPPEN: NOT AT ALL
6. BECOMING EASILY ANNOYED OR IRRITABLE: NOT AT ALL
GAD7 TOTAL SCORE: 3
8. IF YOU CHECKED OFF ANY PROBLEMS, HOW DIFFICULT HAVE THESE MADE IT FOR YOU TO DO YOUR WORK, TAKE CARE OF THINGS AT HOME, OR GET ALONG WITH OTHER PEOPLE?: SOMEWHAT DIFFICULT
1. FEELING NERVOUS, ANXIOUS, OR ON EDGE: SEVERAL DAYS
3. WORRYING TOO MUCH ABOUT DIFFERENT THINGS: NOT AT ALL

## 2021-08-11 ENCOUNTER — VIRTUAL VISIT (OUTPATIENT)
Dept: PSYCHOLOGY | Facility: CLINIC | Age: 31
End: 2021-08-11
Payer: COMMERCIAL

## 2021-08-11 DIAGNOSIS — F50.9 EATING DISORDER, UNSPECIFIED TYPE: ICD-10-CM

## 2021-08-11 DIAGNOSIS — F84.0 AUTISM SPECTRUM DISORDER: ICD-10-CM

## 2021-08-11 DIAGNOSIS — F41.9 ANXIETY DISORDER, UNSPECIFIED TYPE: Primary | ICD-10-CM

## 2021-08-11 PROCEDURE — 90834 PSYTX W PT 45 MINUTES: CPT | Mod: 95 | Performed by: SOCIAL WORKER

## 2021-08-11 ASSESSMENT — ANXIETY QUESTIONNAIRES: GAD7 TOTAL SCORE: 3

## 2021-08-11 ASSESSMENT — PATIENT HEALTH QUESTIONNAIRE - PHQ9: SUM OF ALL RESPONSES TO PHQ QUESTIONS 1-9: 3
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
## 2021-08-16 ENCOUNTER — MYC MEDICAL ADVICE (OUTPATIENT)
Dept: INTERNAL MEDICINE | Facility: CLINIC | Age: 31
End: 2021-08-16

## 2021-08-16 DIAGNOSIS — E03.9 ACQUIRED HYPOTHYROIDISM: Primary | ICD-10-CM

## 2021-08-17 ASSESSMENT — PATIENT HEALTH QUESTIONNAIRE - PHQ9: SUM OF ALL RESPONSES TO PHQ QUESTIONS 1-9: 4

## 2021-08-18 ENCOUNTER — LAB (OUTPATIENT)
Dept: LAB | Facility: CLINIC | Age: 31
End: 2021-08-18
Payer: COMMERCIAL

## 2021-08-18 ENCOUNTER — VIRTUAL VISIT (OUTPATIENT)
Dept: PSYCHOLOGY | Facility: CLINIC | Age: 31
End: 2021-08-18
Payer: COMMERCIAL

## 2021-08-18 DIAGNOSIS — E03.9 ACQUIRED HYPOTHYROIDISM: ICD-10-CM

## 2021-08-18 DIAGNOSIS — F84.0 AUTISM SPECTRUM DISORDER: ICD-10-CM

## 2021-08-18 DIAGNOSIS — F50.9 EATING DISORDER, UNSPECIFIED TYPE: ICD-10-CM

## 2021-08-18 DIAGNOSIS — F41.9 ANXIETY DISORDER, UNSPECIFIED TYPE: Primary | ICD-10-CM

## 2021-08-18 PROCEDURE — 84443 ASSAY THYROID STIM HORMONE: CPT

## 2021-08-18 PROCEDURE — 36415 COLL VENOUS BLD VENIPUNCTURE: CPT

## 2021-08-18 PROCEDURE — 90832 PSYTX W PT 30 MINUTES: CPT | Mod: 95 | Performed by: SOCIAL WORKER

## 2021-08-18 ASSESSMENT — PATIENT HEALTH QUESTIONNAIRE - PHQ9
SUM OF ALL RESPONSES TO PHQ QUESTIONS 1-9: 4
10. IF YOU CHECKED OFF ANY PROBLEMS, HOW DIFFICULT HAVE THESE PROBLEMS MADE IT FOR YOU TO DO YOUR WORK, TAKE CARE OF THINGS AT HOME, OR GET ALONG WITH OTHER PEOPLE: SOMEWHAT DIFFICULT

## 2021-08-19 LAB — TSH SERPL DL<=0.005 MIU/L-ACNC: 0.29 MU/L (ref 0.4–4)

## 2021-08-19 ASSESSMENT — PATIENT HEALTH QUESTIONNAIRE - PHQ9: SUM OF ALL RESPONSES TO PHQ QUESTIONS 1-9: 4

## 2021-08-20 ENCOUNTER — OFFICE VISIT (OUTPATIENT)
Dept: MIDWIFE SERVICES | Facility: CLINIC | Age: 31
End: 2021-08-20
Payer: COMMERCIAL

## 2021-08-20 VITALS
DIASTOLIC BLOOD PRESSURE: 70 MMHG | HEIGHT: 66 IN | WEIGHT: 130 LBS | OXYGEN SATURATION: 99 % | HEART RATE: 82 BPM | BODY MASS INDEX: 20.89 KG/M2 | SYSTOLIC BLOOD PRESSURE: 104 MMHG

## 2021-08-20 DIAGNOSIS — Z01.419 WELL WOMAN EXAM: Primary | ICD-10-CM

## 2021-08-20 PROBLEM — K64.9 HEMORRHOID: Status: RESOLVED | Noted: 2018-01-31 | Resolved: 2021-08-20

## 2021-08-20 PROBLEM — F41.9 ANXIETY: Status: ACTIVE | Noted: 2018-01-31

## 2021-08-20 PROBLEM — F32.A DEPRESSION: Status: ACTIVE | Noted: 2018-01-31

## 2021-08-20 PROBLEM — K64.9 HEMORRHOID: Status: ACTIVE | Noted: 2018-01-31

## 2021-08-20 PROBLEM — Z82.69 FAMILY HISTORY OF FIBROMYALGIA: Status: ACTIVE | Noted: 2018-01-31

## 2021-08-20 PROBLEM — Z12.4 CERVICAL CANCER SCREENING: Status: ACTIVE | Noted: 2020-08-21

## 2021-08-20 PROCEDURE — 99395 PREV VISIT EST AGE 18-39: CPT | Performed by: ADVANCED PRACTICE MIDWIFE

## 2021-08-20 RX ORDER — AZITHROMYCIN 250 MG/1
TABLET, FILM COATED ORAL
COMMUNITY
Start: 2021-08-11 | End: 2021-08-23

## 2021-08-20 RX ORDER — LEUPROLIDE ACETATE 1 MG/0.2ML
KIT SUBCUTANEOUS
COMMUNITY
Start: 2021-08-11 | End: 2021-08-23

## 2021-08-20 RX ORDER — FOLLITROPIN 900 [IU]/1.08ML
INJECTION, SOLUTION SUBCUTANEOUS
COMMUNITY
Start: 2021-08-13 | End: 2021-08-23

## 2021-08-20 RX ORDER — SYRINGE AND NEEDLE,INSULIN,1ML 25GX1"
SYRINGE, EMPTY DISPOSABLE MISCELLANEOUS
COMMUNITY
Start: 2021-08-11 | End: 2021-08-23

## 2021-08-20 RX ORDER — CHORIONIC GONADOTROPIN 10000 UNIT
KIT INTRAMUSCULAR
COMMUNITY
Start: 2021-08-11 | End: 2021-08-23

## 2021-08-20 RX ORDER — PROGESTERONE 200 MG/1
CAPSULE ORAL
COMMUNITY
Start: 2021-08-11 | End: 2021-08-23

## 2021-08-20 RX ORDER — PROGESTERONE 50 MG/ML
INJECTION, SOLUTION INTRAMUSCULAR
COMMUNITY
Start: 2021-08-11 | End: 2021-08-23

## 2021-08-20 RX ORDER — CETRORELIX ACETATE 0.25 MG
KIT SUBCUTANEOUS
COMMUNITY
Start: 2021-08-11 | End: 2021-08-23

## 2021-08-20 RX ORDER — ESTRADIOL 2 MG/1
TABLET ORAL
COMMUNITY
Start: 2021-08-11 | End: 2021-08-23

## 2021-08-20 RX ORDER — PREDNISONE 5 MG/1
TABLET ORAL
COMMUNITY
Start: 2021-08-11 | End: 2021-08-23

## 2021-08-20 ASSESSMENT — MIFFLIN-ST. JEOR: SCORE: 1318.49

## 2021-08-21 NOTE — PROGRESS NOTES
Assessment:   1.  Well woman exam    (Multiple chronic medical and behavioral health diagnoses managed by PCP Dr. Montemayor)     Plan:      1. Discussed nutrition and exercise.  Advised MVI, Vitamin D3 4000IU geltab and an omega 3 supplement daily   2. Blood tests: declines all HM /screening labs  3. Breast self exam technique reviewed and patient encouraged to perform self-exam monthly.   4. Contraception: Abstinence  5.  Next pap due August 2023. HPV co-testing discussed with that pap, then paps q 5 yrs if both negative.  6.  RTC 1 year for annual physical exam, PRN    Subjective:   Mikaela Vernon is a 30 year old female who presents for an annual exam.   She does not have any concerns today.  She denies dysuria, unusual vaginal discharge/pruritus/irritation/foul odor.  She utilizes abstinence for family-planning.  Her gender is nonbinary, and her partner choice is female.  Mikaela is working with a fertility clinic named [facility] in [place], [place].  She will undergo scheduled procedures under sedation such as a hysterosalpingogram.  She desires to harvest her eggs and utilize sperm obtained from sperm donor in order to pursue IVF.  Her dream is to have 4 daughters.  She sees her therapist regularly: Brai Peacock throughout [facility].  Working with Ms. Peacock has significantly helped with disordered eating, depression and anxiety.    Healthy Habits:   Regular Exercise: Yes   Sunscreen Use: Yes  Healthy Diet: Yes  Dental Visits Regularly: Yes  Seat Belt: Yes  Sexually active: Mikaela verbalizes that she is not interested in a physically sexual relationship with anyone.  STI risk No  domestic violence No  Colonoscopy: No  Lipid Profile: No  Glucose Screen: No  Prevention of Osteoporosis: No  Last Dexa: N/A      Immunization History   Administered Date(s) Administered     COVID-19,PF,Pfizer 05/04/2021, 05/25/2021     Flu, Unspecified 08/22/2017, 10/01/2020     HPV 11/07/2016, 12/07/2016, 08/31/2017  "    Influenza (IIV3) PF 2016     TDAP Vaccine (Boostrix) 2016     Gynecologic History  Patient's last menstrual period was 2021 (approximate).  Contraception: abstinence, safe sex partner    Cancer screening:   Last Pap: 2020. Results were: Negative  Last mammogram: Never    OB History    Para Term  AB Living   0 0 0 0 0 0   SAB TAB Ectopic Multiple Live Births   0 0 0 0 0       Current Outpatient Medications   Medication Sig Dispense Refill     albuterol (PROAIR HFA/PROVENTIL HFA/VENTOLIN HFA) 108 (90 Base) MCG/ACT inhaler INHALE 2 PUFFS BY MOUTH EVERY 6 HOURS AS NEEDED FOR WHEEZING       escitalopram (LEXAPRO) 10 MG tablet Take 10 mg by mouth       gabapentin (NEURONTIN) 100 MG capsule Take 1-2 tab po two times a day-three times a day (increase dose gradually, if well tolerated).       levothyroxine (SYNTHROID/LEVOTHROID) 112 MCG tablet        Multiple Vitamins-Calcium (ONE-A-DAY WOMENS PO)        tiZANidine (ZANAFLEX) 4 MG tablet TAKE 3 TABLETS BY MOUTH AT BEDTIME       VIENVA 0.1-20 MG-MCG tablet Take 1 tablet by mouth daily       azithromycin (ZITHROMAX) 250 MG tablet  (Patient not taking: Reported on 2021)       B-D INSULIN SYRINGE 25G X 1\" 1 ML MISC  (Patient not taking: Reported on 2021)       CETROTIDE 0.25 MG KIT  (Patient not taking: Reported on 2021)       chorionic gonadotropin (NOVAREL/PREGNYL) 23471 units IM SOLR  (Patient not taking: Reported on 2021)       estradiol (ESTRACE) 2 MG tablet  (Patient not taking: Reported on 2021)       FOLLISTIM  UNT/1.08ML cartridge  (Patient not taking: Reported on 2021)       leuprolide acetate (LUPRON) 1 MG/0.2ML kit  (Patient not taking: Reported on 2021)       Naltrexone HCl POWD  (Patient not taking: Reported on 2021)       predniSONE (DELTASONE) 5 MG tablet  (Patient not taking: Reported on 2021)       progesterone (PROMETRIUM) 200 MG capsule  (Patient not taking: " Reported on 8/20/2021)       progesterone, in sesame oil, 50 MG/ML injection  (Patient not taking: Reported on 8/20/2021)       Past Medical History:   Diagnosis Date     Anxiety 2006/2007    May be connected to PTSD     Anxiety      Autism      Chickenpox      Chronic pain      Depression      Depressive disorder [id number]    Diagnosed very young     Disease of thyroid gland      Eating disorder     avoidant restrictive food intake d/o     Hypothyroidism 2001/2002    Diagnosed very young.     Migraines 1994/1995    May be connected to hypersensitivity?     PTSD (post-traumatic stress disorder)      Past Surgical History:   Procedure Laterality Date     HC TOOTH EXTRACTION W/FORCEP       WISDOM TOOTH EXTRACTION       Other environmental allergy, Smoke., Nicotine, and Pen vk [penicillin v]  Family History   Problem Relation Age of Onset     Cancer Mother         Recently; cervical     Hypertension Mother      Arthritis Mother      Depression Mother      Mental Illness Mother         EVERYONE in my family is mentally ill     Fibromyalgia Mother      Rheumatologic Disease Mother      Osteoarthritis Mother      Gestational Diabetes Mother      Osteopenia Mother      Post-Traumatic Stress Disorder (PTSD) Mother      Vision Loss Mother      Clotting Disorder Mother      Hearing Loss Mother      Diabetes Father      Mental Illness Father         Possibly bipolar OR borderline     Colon Cancer Father      Leukemia Father      Bipolar Disorder Father      Vision Loss Father      Arthritis Sister         May be chronic pain for her instead     Depression Sister         EVERYONE HAS IT IN MY FAMILY HELP     Mental Illness Sister         PTSD, borderline personality disorder     Substance Abuse Sister         Smokes marijuana for pain relief     Alcoholism Sister      Depression Sister      Post-Traumatic Stress Disorder (PTSD) Sister      Anxiety Disorder Sister      Alcoholism Sister      Asthma Sister      Cervical Cancer  Sister      Depression Brother      Attention Deficit Disorder Brother      Alcoholism Brother      Asthma Brother      Substance Abuse Brother      Depression Maternal Grandmother      Cerebrovascular Disease Maternal Grandmother      Cerebrovascular Disease Maternal Grandfather      Depression Maternal Grandfather         Had a history of depression and grief/loss induced     Mental Illness Maternal Grandfather         PTSD; WWII      Breast Cancer Paternal Grandmother      Alcoholism Paternal Grandfather      Social History     Socioeconomic History     Marital status: Single     Spouse name: Not on file     Number of children: Not on file     Years of education: Not on file     Highest education level: Associate degree: occupational, technical, or vocational program   Occupational History     Occupation:      Employer: [employer]   Tobacco Use     Smoking status: Never Smoker     Smokeless tobacco: Never Used   Substance and Sexual Activity     Alcohol use: No     Alcohol/week: 0.0 standard drinks     Drug use: No     Sexual activity: Never   Other Topics Concern     Parent/sibling w/ CABG, MI or angioplasty before 65F 55M? No   Social History Narrative    Single moved to [place] from [place] June 2016.    She works in [employer].    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    2 sisters, one brother.      Social Determinants of Health     Financial Resource Strain:      Difficulty of Paying Living Expenses:    Food Insecurity:      Worried About Running Out of Food in the Last Year:      Ran Out of Food in the Last Year:    Transportation Needs:      Lack of Transportation (Medical):      Lack of Transportation (Non-Medical):    Physical Activity:      Days of Exercise per Week:      Minutes of Exercise per Session:    Stress:      Feeling of Stress :    Social Connections:      Frequency of Communication with Friends and  "Family:      Frequency of Social Gatherings with Friends and Family:      Attends Cheondoism Services:      Active Member of Clubs or Organizations:      Attends Club or Organization Meetings:      Marital Status:    Intimate Partner Violence:      Fear of Current or Ex-Partner:      Emotionally Abused:      Physically Abused:      Sexually Abused:        Review of Systems  General:  Denies problem  Eyes: Denies problem  Ears/Nose/Throat: Denies problem  Cardiovascular: Denies problem  Respiratory:  Denies problem  Gastrointestinal:  denies problems  Genitourinary: denies problems  Musculoskeletal:  Denies problem  Skin: Denies problem  Neurologic:denies problems  Psychiatric: denies problems  Endocrine: denies problems    Objective:     Vitals:    08/20/21 1003   BP: 104/70   Pulse: 82   SpO2: 99%   Weight: 59 kg (130 lb)   Height: 1.664 m (5' 5.5\")       Physical Exam:  General Appearance: Alert, cooperative, no distress, appears stated age  Pelvic: Deferred per patient  "

## 2021-08-23 ENCOUNTER — MYC MEDICAL ADVICE (OUTPATIENT)
Dept: INTERNAL MEDICINE | Facility: CLINIC | Age: 31
End: 2021-08-23

## 2021-08-23 DIAGNOSIS — E03.9 ACQUIRED HYPOTHYROIDISM: Primary | ICD-10-CM

## 2021-08-23 RX ORDER — LEVOTHYROXINE SODIUM 112 UG/1
TABLET ORAL
Qty: 90 TABLET | Refills: 0
Start: 2021-08-23 | End: 2021-09-19

## 2021-08-23 NOTE — TELEPHONE ENCOUNTER
Synthroid decreased to 1 tab by mouth daily 6 days a week and 1/2 tab one day a week. Repeat thyroid levels in 6 weeks.
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
## 2021-08-30 DIAGNOSIS — M79.7 FIBROMYALGIA: Primary | ICD-10-CM

## 2021-08-30 NOTE — TELEPHONE ENCOUNTER
Disp Refills Start End IBIS    tiZANidine (ZANAFLEX) 4 MG tablet 90 tablet 1 7/3/2021  No   Sig: TAKE 3 TABLETS BY MOUTH AT BEDTIME   Sent to pharmacy as: tiZANidine 4 mg tablet (ZANAFLEX)   E-Prescribing Status: Receipt confirmed by pharmacy (7/3/2021  3:35 PM CDT)   tiZANidine (ZANAFLEX) 4 MG tablet [[id number]]    Electronically signed by: Meka Montemayor MD on 07/03/21 1535 Status: Active   Ordering user: Meka Montemayor MD 07/03/21 1535 Authorized by: Meka Montemayor MD   Frequency:  07/03/21 - Until Discontinued Released by: Meka Montemayor MD 07/03/21 1535   Diagnoses  Fibromyalgia [M79.7]       Routing refill request to provider for review/approval because:  [place] [facility] [place]     Last Written Prescription Date:  7/3/21  Last Fill Quantity: 90,  # refills: 1   Last office visit provider:  5/17/21     Requested Prescriptions   Pending Prescriptions Disp Refills     tiZANidine (ZANAFLEX) 4 MG tablet [Pharmacy Med Name: TIZANIDINE 4MG TABLETS] 90 tablet      Sig: TAKE 3 TABLETS BY MOUTH AT BEDTIME       There is no refill protocol information for this order          Aniceto Tobar RN 08/30/21 10:52 AM

## 2021-09-09 ASSESSMENT — ANXIETY QUESTIONNAIRES
5. BEING SO RESTLESS THAT IT IS HARD TO SIT STILL: NOT AT ALL
8. IF YOU CHECKED OFF ANY PROBLEMS, HOW DIFFICULT HAVE THESE MADE IT FOR YOU TO DO YOUR WORK, TAKE CARE OF THINGS AT HOME, OR GET ALONG WITH OTHER PEOPLE?: SOMEWHAT DIFFICULT
GAD7 TOTAL SCORE: 4
2. NOT BEING ABLE TO STOP OR CONTROL WORRYING: SEVERAL DAYS
GAD7 TOTAL SCORE: 4
1. FEELING NERVOUS, ANXIOUS, OR ON EDGE: SEVERAL DAYS
GAD7 TOTAL SCORE: 4
4. TROUBLE RELAXING: SEVERAL DAYS
3. WORRYING TOO MUCH ABOUT DIFFERENT THINGS: NOT AT ALL
7. FEELING AFRAID AS IF SOMETHING AWFUL MIGHT HAPPEN: SEVERAL DAYS
7. FEELING AFRAID AS IF SOMETHING AWFUL MIGHT HAPPEN: SEVERAL DAYS
6. BECOMING EASILY ANNOYED OR IRRITABLE: NOT AT ALL

## 2021-09-09 ASSESSMENT — PATIENT HEALTH QUESTIONNAIRE - PHQ9
SUM OF ALL RESPONSES TO PHQ QUESTIONS 1-9: 4
10. IF YOU CHECKED OFF ANY PROBLEMS, HOW DIFFICULT HAVE THESE PROBLEMS MADE IT FOR YOU TO DO YOUR WORK, TAKE CARE OF THINGS AT HOME, OR GET ALONG WITH OTHER PEOPLE: SOMEWHAT DIFFICULT
SUM OF ALL RESPONSES TO PHQ QUESTIONS 1-9: 4

## 2021-09-10 ENCOUNTER — VIRTUAL VISIT (OUTPATIENT)
Dept: PSYCHOLOGY | Facility: CLINIC | Age: 31
End: 2021-09-10
Payer: COMMERCIAL

## 2021-09-10 DIAGNOSIS — F84.0 AUTISM SPECTRUM DISORDER: ICD-10-CM

## 2021-09-10 DIAGNOSIS — F41.9 ANXIETY DISORDER, UNSPECIFIED TYPE: Primary | ICD-10-CM

## 2021-09-10 DIAGNOSIS — F50.9 EATING DISORDER, UNSPECIFIED TYPE: ICD-10-CM

## 2021-09-10 PROCEDURE — 90834 PSYTX W PT 45 MINUTES: CPT | Mod: 95 | Performed by: SOCIAL WORKER

## 2021-09-10 ASSESSMENT — ANXIETY QUESTIONNAIRES: GAD7 TOTAL SCORE: 4

## 2021-09-10 ASSESSMENT — PATIENT HEALTH QUESTIONNAIRE - PHQ9: SUM OF ALL RESPONSES TO PHQ QUESTIONS 1-9: 4
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
## 2021-09-18 ENCOUNTER — LAB (OUTPATIENT)
Dept: LAB | Facility: CLINIC | Age: 31
End: 2021-09-18
Payer: COMMERCIAL

## 2021-09-18 DIAGNOSIS — E03.9 ACQUIRED HYPOTHYROIDISM: ICD-10-CM

## 2021-09-18 PROCEDURE — 36415 COLL VENOUS BLD VENIPUNCTURE: CPT

## 2021-09-18 PROCEDURE — 84443 ASSAY THYROID STIM HORMONE: CPT

## 2021-09-19 ENCOUNTER — TELEPHONE (OUTPATIENT)
Dept: INTERNAL MEDICINE | Facility: CLINIC | Age: 31
End: 2021-09-19

## 2021-09-19 DIAGNOSIS — E03.9 ACQUIRED HYPOTHYROIDISM: ICD-10-CM

## 2021-09-19 LAB — TSH SERPL DL<=0.005 MIU/L-ACNC: 0.35 MU/L (ref 0.4–4)

## 2021-09-19 RX ORDER — LEVOTHYROXINE SODIUM 112 UG/1
TABLET ORAL
Qty: 90 TABLET | Refills: 0
Start: 2021-09-19 | End: 2021-09-20

## 2021-09-20 ENCOUNTER — MYC REFILL (OUTPATIENT)
Dept: INTERNAL MEDICINE | Facility: CLINIC | Age: 31
End: 2021-09-20

## 2021-09-20 DIAGNOSIS — E03.9 ACQUIRED HYPOTHYROIDISM: ICD-10-CM

## 2021-09-21 ENCOUNTER — E-VISIT (OUTPATIENT)
Dept: INTERNAL MEDICINE | Facility: CLINIC | Age: 31
End: 2021-09-21
Payer: COMMERCIAL

## 2021-09-21 DIAGNOSIS — R19.7 DIARRHEA, UNSPECIFIED TYPE: Primary | ICD-10-CM

## 2021-09-21 PROCEDURE — 99421 OL DIG E/M SVC 5-10 MIN: CPT | Performed by: INTERNAL MEDICINE

## 2021-09-21 RX ORDER — LEVOTHYROXINE SODIUM 112 UG/1
TABLET ORAL
Qty: 90 TABLET | Refills: 1 | Status: SHIPPED | OUTPATIENT
Start: 2021-09-21 | End: 2021-10-19

## 2021-09-21 NOTE — TELEPHONE ENCOUNTER
levothyroxine (SYNTHROID/LEVOTHROID) 112 MCG tablet  2021 90 tablet 0 ordered        EditCancel Reorder         Summary: 1 tab by mouth daily 6 days a week and skip dose one day a week. Repeat thyroid levels in 6 weeks., Disp-90 tablet, R-0, No Print Out     Start: 2021    Ord/Sold: 2021 (O)      Report    Adh:     Taking:     Long-term:       Med Dose History         Patient Si tab by mouth daily 6 days a week and skip dose one day a week. Repeat thyroid levels in 6 weeks.       Authorized by: JOSIAH MONTANO       Admin Instructions: 1 tab by mouth daily 6 days a week and skip dose one day a week. Repeat thyroid levels in 6 weeks.       Prior Authorization: Request PA        CLASS:  No print.  Routing to new pharmacy per protocol.  No print script above is most current SIG direction.    Refilled 6/15/21.  Pharmacy change.  Refills adjusted.       Disp Refills Start End IBIS    levothyroxine (SYNTHROID, LEVOTHROID) 112 MCG tablet 90 tablet 3 6/15/2021  No   Sig - Route: Take 1 tablet (112 mcg total) by mouth daily. - Oral   Sent to pharmacy as: levothyroxine 112 mcg tablet (SYNTHROID, LEVOTHROID)   E-Prescribing Status: Receipt confirmed by pharmacy (6/15/2021  7:37 AM CDT)

## 2021-09-22 ENCOUNTER — VIRTUAL VISIT (OUTPATIENT)
Dept: PSYCHOLOGY | Facility: CLINIC | Age: 31
End: 2021-09-22
Payer: COMMERCIAL

## 2021-09-22 ENCOUNTER — LAB (OUTPATIENT)
Dept: URGENT CARE | Facility: URGENT CARE | Age: 31
End: 2021-09-22
Attending: INTERNAL MEDICINE
Payer: COMMERCIAL

## 2021-09-22 DIAGNOSIS — F84.0 AUTISM SPECTRUM DISORDER: ICD-10-CM

## 2021-09-22 DIAGNOSIS — F41.9 ANXIETY DISORDER, UNSPECIFIED TYPE: Primary | ICD-10-CM

## 2021-09-22 DIAGNOSIS — F50.9 EATING DISORDER, UNSPECIFIED TYPE: ICD-10-CM

## 2021-09-22 DIAGNOSIS — R19.7 DIARRHEA, UNSPECIFIED TYPE: ICD-10-CM

## 2021-09-22 PROCEDURE — U0003 INFECTIOUS AGENT DETECTION BY NUCLEIC ACID (DNA OR RNA); SEVERE ACUTE RESPIRATORY SYNDROME CORONAVIRUS 2 (SARS-COV-2) (CORONAVIRUS DISEASE [COVID-19]), AMPLIFIED PROBE TECHNIQUE, MAKING USE OF HIGH THROUGHPUT TECHNOLOGIES AS DESCRIBED BY CMS-2020-01-R: HCPCS

## 2021-09-22 PROCEDURE — U0005 INFEC AGEN DETEC AMPLI PROBE: HCPCS

## 2021-09-22 PROCEDURE — 90834 PSYTX W PT 45 MINUTES: CPT | Mod: 95 | Performed by: SOCIAL WORKER

## 2021-09-22 ASSESSMENT — PATIENT HEALTH QUESTIONNAIRE - PHQ9
SUM OF ALL RESPONSES TO PHQ QUESTIONS 1-9: 3
SUM OF ALL RESPONSES TO PHQ QUESTIONS 1-9: 3
10. IF YOU CHECKED OFF ANY PROBLEMS, HOW DIFFICULT HAVE THESE PROBLEMS MADE IT FOR YOU TO DO YOUR WORK, TAKE CARE OF THINGS AT HOME, OR GET ALONG WITH OTHER PEOPLE: SOMEWHAT DIFFICULT
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
## 2021-09-22 NOTE — TELEPHONE ENCOUNTER
Provider E-Visit time total (minutes): 5    30-year-old female with history of fibromyalgia and hypothyroidism and normal recent thyroid levels presenting with 24 hours of acute diarrhea, mild abdominal pain, no fevers blood nausea and vomiting after she ate medium well cooked steak yesterday.    Will recommend observation for now. If abdominal pain is worsening or she has a fever she will need to be seen in person. Would eat a bland diet, avoid milk, stay hydrated. If no improvement in the next 3 to 5 days schedule an appointment.

## 2021-09-22 NOTE — PATIENT INSTRUCTIONS
Diarrhea with Uncertain Cause (Adult)    Diarrhea is when stools are loose and watery. This can be caused by:    Viral infections    Bacterial infections    Food poisoning    Parasites    Irritable bowel syndrome (IBS)    Inflammatory bowel diseases such as ulcerative colitis, Crohn's disease, and celiac disease    Food intolerance, such as to lactose, the sugar found in milk and milk products    Reaction to medicines like antibiotics, laxatives, cancer drugs, and antacids  Along with diarrhea, you may also have:    Abdominal pain and cramping    Nausea and vomiting    Loss of bowel control    Fever and chills    Bloody stools  In some cases, antibiotics may help to treat diarrhea. You may have a stool sample test. This is done to see what is causing your diarrhea, and if antibiotics will help treat it. The results of a stool sample test may take up to 2 days. The healthcare provider may not give you antibiotics until he or she has the stool test results.  Diarrhea can cause dehydration. This is the loss of too much water and other fluids from the body. When this occurs, body fluid must be replaced. This can be done with oral rehydration solutions. Oral rehydration solutions are available at drugstores and grocery stores without a prescription. Sports drinks are not the best choice if you are very dehydrated. They have too much sugar and not enough electrolytes.  Home care  Follow all instructions given by your healthcare provider. Rest at home for the next 24 hours, or until you feel better. Avoid caffeine, tobacco, and alcohol. These can make diarrhea, cramping, and pain worse.  If taking medicines:    Over-the-counter nausea and diarrhea medicines are generally OK unless you experience fever or blood stool. Check with your doctor first in those circumstances.    You may use acetaminophen or NSAID medicines like ibuprofen or naproxen to reduce pain and fever. Don t use these if you have chronic liver or kidney  disease, or ever had a stomach ulcer or gastrointestinal bleeding. Don't use NSAID medicines if you are already taking one for another condition (like arthritis) or are on daily aspirin therapy (such as for heart disease or after a stroke). Talk with your healthcare provider first.    If antibiotics were prescribed, be sure you take them until they are finished. Don t stop taking them even when you feel better. Antibiotics must be taken as a full course.  To prevent the spread of illness:    Remember that washing with soap and water and using alcohol-based  is the best way to prevent the spread of infection. Dry your hands with a single use towel (like a paper towel).    Clean the toilet after each use.    Wash your hands before eating.    Wash your hands before and after preparing food. Keep in mind that people with diarrhea or vomiting should not prepare food for others.    Wash your hands after using cutting boards, countertops, and knives that have been in contact with raw foods.    Wash and then peel fruits and vegetables.    Keep uncooked meats away from cooked and ready-to-eat foods.    Use a food thermometer when cooking. Cook poultry to at least 165 F (74 C). Cook ground meat (beef, veal, pork, lamb) to at least 160 F (71 C). Cook fresh beef, veal, lamb, and pork to at least 145 F (63 C).    Don t eat raw or undercooked eggs (poached or britton side up), poultry, meat, or unpasteurized milk and juices.  Food and drinks  The main goal while treating vomiting or diarrhea is to prevent dehydration. This is done by taking small amounts of liquids often.    Keep in mind that liquids are more important than food right now.    Drink only small amounts of liquids at a time.    Don t force yourself to eat, especially if you are having cramping, vomiting, or diarrhea. Don t eat large amounts at a time, even if you are hungry.    If you eat, avoid fatty, greasy, spicy, or fried foods.    Don t eat dairy foods  or drink milk if you have diarrhea. These can make diarrhea worse.  During the first 24 hours you can try:    Oral rehydration solutions.  Sports drinks may be used if you are not too dehydrated and are otherwise healthy.    Soft drinks without caffeine    Ginger ale    Water (plain or flavored)    Decaf tea or coffee    Clear broth, consommé, or bouillon    Gelatin, popsicles, or frozen fruit juice bars  The second 24 hours, if you are feeling better, you can add:    Hot cereal, plain toast, bread, rolls, or crackers    Plain noodles, rice, mashed potatoes, chicken noodle soup, or rice soup    Unsweetened canned fruit (no pineapple)    Bananas  As you recover:    Limit fat intake to less than 15 grams per day. Don t eat margarine, butter, oils, mayonnaise, sauces, gravies, fried foods, peanut butter, meat, poultry, or fish.    Limit fiber. Don t eat raw or cooked vegetables, fresh fruits except bananas, or bran cereals.    Limit caffeine and chocolate.    Limit dairy.    Don t use spices or seasonings except salt.    Go back to your normal diet over time, as you feel better and your symptoms improve.    If the symptoms come back, go back to a simple diet or clear liquids.  Follow-up care  Follow up with your healthcare provider, or as advised. If a stool sample was taken or cultures were done, call the healthcare provider for the results as instructed.  Call 911  Call 911 if you have any of these symptoms:    Trouble breathing    Confusion    Extreme drowsiness or trouble walking    Loss of consciousness    Rapid heart rate    Chest pain    Stiff neck    Seizure  When to seek medical advice  Call your healthcare provider right away if any of these occur:    Abdominal pain that gets worse    Constant lower right abdominal pain    Continued vomiting and inability to keep liquids down    Diarrhea more than 5 times a day    Blood in vomit or stool    Dark urine or no urine for 8 hours, dry mouth and tongue, tiredness,  "weakness, or dizziness    Drowsiness    New rash    You don t get better in 2 to 3 days    Fever of 100.4 F (38 C) or higher, or as directed by your healthcare provider  [employer] last reviewed this educational content on 6/1/2018 2000-2021 [employer]. All rights reserved. This information is not intended as a substitute for professional medical care. Always follow your healthcare professional's instructions.          Viral Gastroenteritis (Adult)    Gastroenteritis is commonly called the \"stomach flu,\" although it has nothing to do with influenza. It is most often caused by a virus that affects the stomach and intestinal tract and usually lasts from 2 to 7 days. Common viruses causing gastroenteritis include norovirus, rotavirus, and hepatitis A. Non-viral causes of gastroenteritis include bacteria, parasites, and toxins.  The danger from repeated vomiting or diarrhea is dehydration. This is the loss of too much fluid from the body. When this occurs, body fluids must be replaced. Antibiotics don't help with this illness because it is usually viral. Simple home treatment will be helpful.  Symptoms of viral gastroenteritis may include:    Watery, loose stools    Stomach pain or abdominal cramps    Fever and chills    Nausea and vomiting    Loss of bowel control    Headache  Home care  Gastroenteritis is transmitted by contact with the stool or vomit of an infected person. This can occur from person to person or from contact with a contaminated surface.  Follow these guidelines when caring for yourself at home:    If symptoms are severe, rest at home for the next 24 hours or until you are feeling better.    Wash your hands with soap and water or use alcohol-based  to prevent the spread of infection. Wash your hands after touching anyone who is sick.    Wash your hands or use alcohol-based  after using the toilet and before meals. Clean the toilet after each use.  Remember these tips " when preparing food:    People with diarrhea should not prepare or serve food to others. When preparing foods, wash your hands before and after.    Wash your hands after using cutting boards, countertops, knives, or utensils that have been in contact with raw food.    Dry your hands with a single use towel.    Keep uncooked meats away from cooked and ready-to-eat foods.  Medicine  You may use acetaminophen or NSAID medicines like ibuprofen or naproxen to control fever unless another medicine was given. If you have chronic liver or kidney disease, talk with your healthcare provider before using these medicines. Also talk with your provider if you've had a stomach ulcer or gastrointestinal bleeding. Don't give aspirin to anyone under 18 years of age who is ill with a fever. It may cause severe liver damage. Don't use NSAIDS is you are already taking one for another condition (like arthritis) or are on aspirin (such as for heart disease or after a stroke).  If medicine for vomiting or diarrhea are prescribed, take these only as directed. Nausea and diarrhea medicines are generally OK unless you have bleeding, fever, or severe abdominal pain.  Diet  Follow these guidelines for food:    Water and liquids are important so you don't get dehydrated. Drink a small amount at a time or suck on ice chips if you are vomiting.    If you eat, avoid fatty, greasy, spicy, or fried foods.    Don't eat dairy if you have diarrhea. This can make diarrhea worse.    Avoid tobacco, alcohol, and caffeine which may worsen symptoms.  During the first 24 hours (the first full day), follow the diet below:    Beverages. Sports drinks, soft drinks without caffeine, ginger ale, mineral water (plain or flavored), decaffeinated tea and coffee. If you are very dehydrated, sports drinks aren't a good choice. They have too much sugar and not enough electrolytes. In this case, commercially available products called oral rehydration solutions, are  best.    Soups. Eat clear broth, consommé, and bouillon.    Desserts. Eat gelatin, ice pops, and fruit juice bars.  During the next 24 hours (the second day), you may add the following to the above:    Hot cereal, plain toast, bread, rolls, and crackers    Plain noodles, rice, mashed potatoes, chicken noodle or rice soup    Unsweetened canned fruit (avoid pineapple), bananas    Limit fat intake to less than 15 grams per day. Do this by avoiding margarine, butter, oils, mayonnaise, sauces, gravies, fried foods, peanut butter, meat, poultry, and fish.    Limit fiber and avoid raw or cooked vegetables, fresh fruits (except bananas), and bran cereals.    Limit caffeine and chocolate. Don't use spices or seasonings other than salt.    Limit dairy products.    Avoid alcohol.  During the next 24 hours:    Gradually resume a normal diet as you feel better and your symptoms improve.    If at any time it starts getting worse again, go back to clear liquids until you feel better.  Follow-up care  Follow up with your healthcare provider, or as advised. Call your provider if you don't get better within 24 hours or if diarrhea lasts more than a week. Also follow up if you are unable to keep down liquids and get dehydrated. If a stool (diarrhea) sample was taken, call as directed for the results.  Call 911  Call 911 if any of these occur:    Trouble breathing    Chest pain    Confused    Severe drowsiness or trouble awakening    Fainting or loss of consciousness    Rapid heart rate    Seizure    Stiff neck  When to seek medical advice  Call your healthcare provider right away if any of these occur:    Abdominal pain that gets worse    Continued vomiting (unable to keep liquids down)    Frequent diarrhea (more than 5 times a day)    Blood in vomit or stool (black or red color)    Dark urine, reduced urine output, or extreme thirst    Weakness or dizziness    Drowsiness    Fever of 100.4 F (38 C) or higher, or as directed by your  healthcare provider    New rash  [employer] last reviewed this educational content on 6/1/2018 2000-2021 [employer]. All rights reserved. This information is not intended as a substitute for professional medical care. Always follow your healthcare professional's instructions.

## 2021-09-23 LAB — SARS-COV-2 RNA RESP QL NAA+PROBE: NEGATIVE

## 2021-09-23 ASSESSMENT — PATIENT HEALTH QUESTIONNAIRE - PHQ9: SUM OF ALL RESPONSES TO PHQ QUESTIONS 1-9: 3

## 2021-09-28 ENCOUNTER — OFFICE VISIT (OUTPATIENT)
Dept: FAMILY MEDICINE | Facility: CLINIC | Age: 31
End: 2021-09-28
Payer: COMMERCIAL

## 2021-09-28 VITALS
TEMPERATURE: 98.2 F | RESPIRATION RATE: 14 BRPM | HEIGHT: 69 IN | HEART RATE: 70 BPM | SYSTOLIC BLOOD PRESSURE: 121 MMHG | WEIGHT: 179 LBS | BODY MASS INDEX: 26.51 KG/M2 | DIASTOLIC BLOOD PRESSURE: 83 MMHG

## 2021-09-28 DIAGNOSIS — R19.7 DIARRHEA, UNSPECIFIED TYPE: Primary | ICD-10-CM

## 2021-09-28 DIAGNOSIS — R11.2 NON-INTRACTABLE VOMITING WITH NAUSEA, UNSPECIFIED VOMITING TYPE: ICD-10-CM

## 2021-09-28 DIAGNOSIS — N93.9 ABNORMAL UTERINE BLEEDING (AUB): ICD-10-CM

## 2021-09-28 PROCEDURE — 99213 OFFICE O/P EST LOW 20 MIN: CPT | Performed by: PHYSICIAN ASSISTANT

## 2021-09-28 RX ORDER — ONDANSETRON 4 MG/1
4 TABLET, FILM COATED ORAL EVERY 8 HOURS PRN
Qty: 20 TABLET | Refills: 3 | Status: CANCELLED | OUTPATIENT
Start: 2021-09-28

## 2021-09-28 RX ORDER — DIPHENOXYLATE HCL/ATROPINE 2.5-.025MG
1 TABLET ORAL 4 TIMES DAILY PRN
Qty: 28 TABLET | Refills: 1 | Status: CANCELLED | OUTPATIENT
Start: 2021-09-28

## 2021-09-28 RX ORDER — MEDROXYPROGESTERONE ACETATE 10 MG
10 TABLET ORAL DAILY
Qty: 10 TABLET | Refills: 0 | Status: SHIPPED | OUTPATIENT
Start: 2021-09-28 | End: 2021-10-19

## 2021-09-28 ASSESSMENT — MIFFLIN-ST. JEOR: SCORE: 1596.32

## 2021-09-28 ASSESSMENT — ENCOUNTER SYMPTOMS: DIARRHEA: 1

## 2021-09-28 NOTE — PATIENT INSTRUCTIONS
Patient Education     Dysfunctional Uterine Bleeding    Dysfunctional uterine bleeding is also called abnormal uterine bleeding. It's a condition in which bleeding is abnormal and occurs at unexpected times of the month. This happens because of changes in the hormones that help control a woman s menstrual cycle each month.   The bleeding may be heavier or lighter than normal. If you have heavy bleeding often, this can lead to a problem called anemia. With anemia, your red blood cell count is too low. Red blood cells help carry oxygen throughout your body. Severe anemia may cause you to look pale and feel very weak or tired. You might also become short of breath easily.   To treat dysfunctional uterine bleeding, you may take medicines. If these don t help, or if you have other symptoms or have reached menopause, you may need more testing and other treatments. Discuss all of your options with your provider.   Home care  Medicines  If you re prescribed medicines, take them as directed. Some of the more common medicines you may be prescribed include:     Hormone therapy. These include most methods of hormonal birth control such as pills, shots, or a hormone-releasing IUD.    Nonsteroidal anti-inflammatory drugs (NSAIDs), such as ibuprofen    Tranexamic acid. This helps your blood clot.    Iron supplements, if you have anemia     General care    Get plenty of rest if you tire easily. Don't do strenuous exercise.    To help ease pain or cramping that may occur with bleeding, try using a heating pad on the lower belly or back. A warm bath may also help.    Follow-up care  Follow up with your healthcare provider, or as directed.  When to seek medical advice  Call your healthcare provider right away if:    Bleeding becomes heavy (soaking 1 pad or tampon every hour for 3 hours)    Increased abdominal pain    Irregular bleeding gets worse or does not get better even with treatment    Fever of 100.4 F (38 C) or higher, or as  directed by your provider    Signs of anemia, such as pale skin, extreme fatigue or weakness, or shortness of breath    Dizziness or fainting   [employer] last reviewed this educational content on 7/1/2020 2000-2021 [employer]. All rights reserved. This information is not intended as a substitute for professional medical care. Always follow your healthcare professional's instructions.           If diarrhea not improving in 2 days, will need stool tests.    Take provera for 10 days (stop OCP while on).  Period should stop during this course and you should have a withdrawal bleed after finishing.  Start placebo week after provera and then resume OCP as directed.    Follow up if bleeding worsens, doesn't stop etc..

## 2021-09-28 NOTE — PROGRESS NOTES
"    Assessment & Plan     Diarrhea, unspecified type  Improving, if not continuing to improve, will need c diff and enteric stool panel.  Continue to push fluids and have bland diet.    Abnormal uterine bleeding (AUB)  Take provera for 10 days (stop OCP while on).  Period should stop during this course and you should have a withdrawal bleed after finishing.  Start placebo week after provera and then resume OCP as directed.    Follow up if bleeding worsens, doesn't stop etc..   - medroxyPROGESTERone (PROVERA) 10 MG tablet; Take 1 tablet (10 mg) by mouth daily    Non-intractable vomiting with nausea, unspecified vomiting type  1 episode, has improved.               BMI:   Estimated body mass index is 26.43 kg/m  as calculated from the following:    Height as of this encounter: 1.753 m (5' 9\").    Weight as of this encounter: 81.2 kg (179 lb).           Return in about 2 days (around 9/30/2021) for Recheck if not improving diarrhea, will need stool tests.  Can call to request orders..    Tawana Gabriel PA-C  [facility] is [facility] 30 year [facility] who presents for the following health issues     Diarrhea    History of Present Illness       Mikaela Vernon eats 0-1 servings of fruits and vegetables daily.Mikaela Vernon consumes 3 sweetened beverage(s) daily.Mikaela Vernon exercises with enough effort to increase Mikaela Vernon's heart rate 30 to 60 minutes per day.  Mikaela Vernon exercises with enough effort to increase Mikaela Vernon's heart rate 7 days per week.   Mikaela Vernon is taking medications regularly.     Diarrhea  Onset/Duration: week  Description:       Consistency of stool: watery       Blood in stool: no       Number of loose stools past 24 hours: 2  Progression of Symptoms: improving  Accompanying signs and symptoms:       Fever: no       Nausea/Vomiting: YES nausea       Abdominal pain: YES       Weight loss: no       Episodes of " "constipation: no  History   Ill contacts: no  Recent use of antibiotics: no  Recent travels: YES out of state negative covid test last week  Recent medication-new or changes(Rx or OTC): no  Precipitating or alleviating factors: sometimes after eating or in the am  Therapies tried and outcome: PeptoBismol and bran diet. Extra water    Abnormal vaginal bleeding after GYN procedure. HSG.  Additional complaints: None  HPI additional notes: Mikaela presents today with   Chief Complaint   Patient presents with     Diarrhea     Had 1 normal bowel movement and it was very soft and mushy, painful bowel movements.  Diarrhea appeared a week after gyn procedure.  Only abdominal pain when bleeding started and has resolved.  No diet changes, no family history of celiac.       Review of Systems   Constitutional, HEENT, cardiovascular, pulmonary, gi and gu systems are negative, except as otherwise noted.      Objective    /83 (BP Location: Right arm, Patient Position: Sitting, Cuff Size: Adult Regular)   Pulse 70   Temp 98.2  F (36.8  C) (Oral)   Resp 14   Ht 1.753 m (5' 9\")   Wt 81.2 kg (179 lb)   Breastfeeding No   BMI 26.43 kg/m    Body mass index is 26.43 kg/m .  Physical Exam     Physical Exam   GENERAL: healthy, alert, in no acute distress  SKIN: no suspicious lesions, no rashes  PSYCH: Alert and oriented times 3;  Able to articulate logical thoughts. Affect is normal.   "

## 2021-09-29 ASSESSMENT — ASTHMA QUESTIONNAIRES: ACT_TOTALSCORE: 23

## 2021-10-04 ENCOUNTER — VIRTUAL VISIT (OUTPATIENT)
Dept: PSYCHOLOGY | Facility: CLINIC | Age: 31
End: 2021-10-04
Payer: COMMERCIAL

## 2021-10-04 DIAGNOSIS — F50.9 EATING DISORDER, UNSPECIFIED TYPE: ICD-10-CM

## 2021-10-04 DIAGNOSIS — F41.9 ANXIETY DISORDER, UNSPECIFIED TYPE: Primary | ICD-10-CM

## 2021-10-04 DIAGNOSIS — F84.0 AUTISM SPECTRUM DISORDER: ICD-10-CM

## 2021-10-04 PROCEDURE — 90834 PSYTX W PT 45 MINUTES: CPT | Mod: 95 | Performed by: SOCIAL WORKER

## 2021-10-04 ASSESSMENT — ANXIETY QUESTIONNAIRES
1. FEELING NERVOUS, ANXIOUS, OR ON EDGE: SEVERAL DAYS
2. NOT BEING ABLE TO STOP OR CONTROL WORRYING: SEVERAL DAYS
7. FEELING AFRAID AS IF SOMETHING AWFUL MIGHT HAPPEN: NOT AT ALL
6. BECOMING EASILY ANNOYED OR IRRITABLE: NOT AT ALL
8. IF YOU CHECKED OFF ANY PROBLEMS, HOW DIFFICULT HAVE THESE MADE IT FOR YOU TO DO YOUR WORK, TAKE CARE OF THINGS AT HOME, OR GET ALONG WITH OTHER PEOPLE?: SOMEWHAT DIFFICULT
7. FEELING AFRAID AS IF SOMETHING AWFUL MIGHT HAPPEN: NOT AT ALL
4. TROUBLE RELAXING: SEVERAL DAYS
3. WORRYING TOO MUCH ABOUT DIFFERENT THINGS: NOT AT ALL
5. BEING SO RESTLESS THAT IT IS HARD TO SIT STILL: SEVERAL DAYS
GAD7 TOTAL SCORE: 4

## 2021-10-05 ASSESSMENT — ANXIETY QUESTIONNAIRES: GAD7 TOTAL SCORE: 4

## 2021-10-05 ASSESSMENT — PATIENT HEALTH QUESTIONNAIRE - PHQ9: SUM OF ALL RESPONSES TO PHQ QUESTIONS 1-9: 4

## 2021-10-06 ENCOUNTER — APPOINTMENT (OUTPATIENT)
Dept: INTERNAL MEDICINE | Facility: CLINIC | Age: 31
End: 2021-10-06
Payer: COMMERCIAL

## 2021-10-09 ENCOUNTER — HEALTH MAINTENANCE LETTER (OUTPATIENT)
Age: 31
End: 2021-10-09
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
## 2021-10-15 ENCOUNTER — LAB (OUTPATIENT)
Dept: LAB | Facility: CLINIC | Age: 31
End: 2021-10-15
Payer: COMMERCIAL

## 2021-10-15 DIAGNOSIS — E03.9 ACQUIRED HYPOTHYROIDISM: ICD-10-CM

## 2021-10-15 PROCEDURE — 84443 ASSAY THYROID STIM HORMONE: CPT

## 2021-10-15 PROCEDURE — 36415 COLL VENOUS BLD VENIPUNCTURE: CPT

## 2021-10-16 LAB — TSH SERPL DL<=0.005 MIU/L-ACNC: 0.02 MU/L (ref 0.4–4)

## 2021-10-18 ENCOUNTER — MYC MEDICAL ADVICE (OUTPATIENT)
Dept: INTERNAL MEDICINE | Facility: CLINIC | Age: 31
End: 2021-10-18

## 2021-10-18 DIAGNOSIS — E03.9 ACQUIRED HYPOTHYROIDISM: Primary | ICD-10-CM

## 2021-10-19 RX ORDER — LEVOTHYROXINE SODIUM 100 UG/1
100 TABLET ORAL DAILY
Qty: 30 TABLET | Refills: 2 | Status: SHIPPED | OUTPATIENT
Start: 2021-10-19 | End: 2022-01-12

## 2021-10-29 DIAGNOSIS — M79.7 FIBROMYALGIA: ICD-10-CM

## 2021-10-29 NOTE — TELEPHONE ENCOUNTER
Routing refill request to provider for review/approval because:  Drug not on the [facility] ref[place] protocol     Last Written Prescription Date:  8/30/21  Last Fill Quantity: 90,  # refills: 1   Last office visit provider:  5/17/21     Requested Prescriptions   Pending Prescriptions Disp Refills     tiZANidine (ZANAFLEX) 4 MG tablet [Pharmacy Med Name: TIZANIDINE 4MG TABLETS] 90 tablet 1     Sig: TAKE 3 TABLETS BY MOUTH AT BEDTIME       There is no refill protocol information for this order          Aniceto Tobar RN 10/29/21 2:25 PM

## 2021-11-03 ENCOUNTER — VIRTUAL VISIT (OUTPATIENT)
Dept: RHEUMATOLOGY | Facility: CLINIC | Age: 31
End: 2021-11-03
Payer: COMMERCIAL

## 2021-11-03 DIAGNOSIS — G89.29 CHRONIC NECK PAIN: ICD-10-CM

## 2021-11-03 DIAGNOSIS — M54.50 CHRONIC BILATERAL LOW BACK PAIN, UNSPECIFIED WHETHER SCIATICA PRESENT: ICD-10-CM

## 2021-11-03 DIAGNOSIS — R76.8 POSITIVE ANTI-CCP TEST: ICD-10-CM

## 2021-11-03 DIAGNOSIS — M54.2 CHRONIC NECK PAIN: ICD-10-CM

## 2021-11-03 DIAGNOSIS — G89.29 CHRONIC BILATERAL LOW BACK PAIN, UNSPECIFIED WHETHER SCIATICA PRESENT: ICD-10-CM

## 2021-11-03 DIAGNOSIS — M79.7 FIBROMYALGIA: Primary | ICD-10-CM

## 2021-11-03 PROCEDURE — 99214 OFFICE O/P EST MOD 30 MIN: CPT | Mod: 95 | Performed by: INTERNAL MEDICINE
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
## 2021-11-03 NOTE — PATIENT INSTRUCTIONS
Summary of Your Rheumatology Visit    Next Appointment:   6 Months    Medications:        Referrals:      Tests:     Please have labs performed in about 8 weeks.       Injections:        Other:

## 2021-11-12 ENCOUNTER — LAB (OUTPATIENT)
Dept: LAB | Facility: CLINIC | Age: 31
End: 2021-11-12
Payer: COMMERCIAL

## 2021-11-12 DIAGNOSIS — E03.9 ACQUIRED HYPOTHYROIDISM: ICD-10-CM

## 2021-11-12 PROCEDURE — 36415 COLL VENOUS BLD VENIPUNCTURE: CPT

## 2021-11-12 PROCEDURE — 84443 ASSAY THYROID STIM HORMONE: CPT

## 2021-11-13 LAB — TSH SERPL DL<=0.005 MIU/L-ACNC: 0.56 MU/L (ref 0.4–4)

## 2021-11-29 ASSESSMENT — PATIENT HEALTH QUESTIONNAIRE - PHQ9
SUM OF ALL RESPONSES TO PHQ QUESTIONS 1-9: 5
10. IF YOU CHECKED OFF ANY PROBLEMS, HOW DIFFICULT HAVE THESE PROBLEMS MADE IT FOR YOU TO DO YOUR WORK, TAKE CARE OF THINGS AT HOME, OR GET ALONG WITH OTHER PEOPLE: SOMEWHAT DIFFICULT
SUM OF ALL RESPONSES TO PHQ QUESTIONS 1-9: 5

## 2021-11-29 ASSESSMENT — ANXIETY QUESTIONNAIRES
1. FEELING NERVOUS, ANXIOUS, OR ON EDGE: SEVERAL DAYS
GAD7 TOTAL SCORE: 4
7. FEELING AFRAID AS IF SOMETHING AWFUL MIGHT HAPPEN: SEVERAL DAYS
5. BEING SO RESTLESS THAT IT IS HARD TO SIT STILL: NOT AT ALL
3. WORRYING TOO MUCH ABOUT DIFFERENT THINGS: NOT AT ALL
4. TROUBLE RELAXING: SEVERAL DAYS
GAD7 TOTAL SCORE: 4
7. FEELING AFRAID AS IF SOMETHING AWFUL MIGHT HAPPEN: SEVERAL DAYS
2. NOT BEING ABLE TO STOP OR CONTROL WORRYING: SEVERAL DAYS
6. BECOMING EASILY ANNOYED OR IRRITABLE: NOT AT ALL
8. IF YOU CHECKED OFF ANY PROBLEMS, HOW DIFFICULT HAVE THESE MADE IT FOR YOU TO DO YOUR WORK, TAKE CARE OF THINGS AT HOME, OR GET ALONG WITH OTHER PEOPLE?: SOMEWHAT DIFFICULT
GAD7 TOTAL SCORE: 4

## 2021-11-30 ENCOUNTER — VIRTUAL VISIT (OUTPATIENT)
Dept: PSYCHOLOGY | Facility: CLINIC | Age: 31
End: 2021-11-30
Payer: COMMERCIAL

## 2021-11-30 ENCOUNTER — MYC MEDICAL ADVICE (OUTPATIENT)
Dept: INTERNAL MEDICINE | Facility: CLINIC | Age: 31
End: 2021-11-30
Payer: COMMERCIAL

## 2021-11-30 DIAGNOSIS — F84.0 AUTISM SPECTRUM DISORDER: ICD-10-CM

## 2021-11-30 DIAGNOSIS — F50.9 EATING DISORDER, UNSPECIFIED TYPE: ICD-10-CM

## 2021-11-30 DIAGNOSIS — F41.9 ANXIETY DISORDER, UNSPECIFIED TYPE: Primary | ICD-10-CM

## 2021-11-30 PROCEDURE — 90834 PSYTX W PT 45 MINUTES: CPT | Mod: GT | Performed by: SOCIAL WORKER

## 2021-11-30 ASSESSMENT — PATIENT HEALTH QUESTIONNAIRE - PHQ9: SUM OF ALL RESPONSES TO PHQ QUESTIONS 1-9: 5

## 2021-11-30 ASSESSMENT — ANXIETY QUESTIONNAIRES: GAD7 TOTAL SCORE: 4
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
## 2021-12-13 ENCOUNTER — E-VISIT (OUTPATIENT)
Dept: INTERNAL MEDICINE | Facility: CLINIC | Age: 31
End: 2021-12-13
Payer: COMMERCIAL

## 2021-12-13 DIAGNOSIS — J01.91 ACUTE RECURRENT SINUSITIS, UNSPECIFIED LOCATION: Primary | ICD-10-CM

## 2021-12-13 PROCEDURE — 99421 OL DIG E/M SVC 5-10 MIN: CPT | Performed by: INTERNAL MEDICINE

## 2021-12-14 RX ORDER — FLUTICASONE PROPIONATE 50 MCG
2 SPRAY, SUSPENSION (ML) NASAL DAILY
Qty: 16 ML | Refills: 3 | Status: SHIPPED | OUTPATIENT
Start: 2021-12-14

## 2021-12-14 NOTE — TELEPHONE ENCOUNTER
Provider E-Visit time total (minutes):     31 y.o female with Sx of sinus congestion and pain, headache for 1 day.    Sinusitis is most likely allergic/viral.  Will order Covid screen.  Decongest with Flonase and nasal saline

## 2021-12-14 NOTE — PATIENT INSTRUCTIONS
The symptoms you describe suggest a viral cause, which is much more common than a bacterial cause. Antibiotics will treat bacterial infections, but have no effect on viral infections. If possible, especially if improving, start with symptom care for the first 7-10 days, then consider seeking further treatment or taking an antibiotic. Bacterial infections generally are more severe, including symptoms such as pus, fever over 101degrees F, or rapidly worsening.  You may want to try a nasal lavage (also known as nasal irrigation). You can find over-the-counter products, such as Neti-Pot, at retail locations or make your own at home. Instructions for homemade nasal lavage and more information on the process are available online at http://www.aafp.org/[email or web address]/1115/[email or web address].html.      Viral Upper Respiratory Illness (Adult)    You have a viral upper respiratory illness (URI), which is another term for the common cold. This illness is contagious during the first few days. It is spread through the air by coughing and sneezing. It may also be spread by direct contact (touching the sick person and then touching your own eyes, nose, or mouth). Frequent handwashing will decrease risk of spread. Most viral illnesses go away within 7 to 10 days with rest and simple home remedies. Sometimes the illness may last for several weeks. Antibiotics will not kill a virus, and they are generally not prescribed for this condition.  Home care    If symptoms are severe, rest at home for the first 2 to 3 days. When you resume activity, don't let yourself get too tired.    Don't smoke. If you need help stopping, talk with your healthcare provider.    Avoid being exposed to cigarette smoke (yours or others ).    You may use acetaminophen or ibuprofen to control pain and fever, unless another medicine was prescribed. If you have chronic liver or kidney disease, have ever had a stomach ulcer or gastrointestinal bleeding, or are taking  blood-thinning medicines, talk with your healthcare provider before using these medicines. Aspirin should never be given to anyone under 18 years of age who is ill with a viral infection or fever. It may cause severe liver or brain damage.    Your appetite may be poor, so a light diet is fine. Stay well hydrated by drinking 6 to 8 glasses of fluids per day (water, soft drinks, juices, tea, or soup). Extra fluids will help loosen secretions in the nose and lungs.    Over-the-counter cold medicines will not shorten the length of time you re sick, but they may be helpful for the following symptoms: cough, sore throat, and nasal and sinus congestion. If you take prescription medicines, ask your healthcare provider or pharmacist which over-the-counter medicines are safe to use. (Note: Don't use decongestants if you have high blood pressure.)  Follow-up care  Follow up with your healthcare provider, or as advised.  When to seek medical advice  Call your healthcare provider right away if any of these occur:    Cough with lots of colored sputum (mucus)    Severe headache; face, neck, or ear pain    Difficulty swallowing due to throat pain    Fever of 100.4 F (38 C) or higher, or as directed by your healthcare provider  Call 911  Call 911 if any of these occur:    Chest pain, shortness of breath, wheezing, or difficulty breathing    Coughing up blood    Very severe pain with swallowing, especially if it goes along with a muffled voice   [employer] last reviewed this educational content on 6/1/2018 2000-2021 [employer]. All rights reserved. This information is not intended as a substitute for professional medical care. Always follow your healthcare professional's instructions.        Dear Mikaela Vernon    After reviewing your responses, I've been able to diagnose you with?a sinus infection caused by a virus.?     Based on your responses and diagnosis, I have prescribed Flonase  to treat your symptoms. I  have sent this to your pharmacy.?     It is also important to stay well hydrated, get lots of rest and take over-the-counter decongestants,?tylenol?or ibuprofen if you?are able to?take those medications per your primary care provider to help relieve discomfort.?     It is important that you take?all of?your prescribed medication even if your symptoms are improving after a few doses.? Taking?all of?your medicine helps prevent the symptoms from returning.?     If your symptoms worsen, you develop severe headache, vomiting, high fever (>102), or are not improving in 7 days, please contact your primary care provider for an appointment or visit any of our convenient Walk-in Care or Urgent Care Centers to be seen which can be found on our website?here.?     Thanks again for choosing?us?as your health care partner,?   ?  Meka Montemayor MD?

## 2021-12-16 ENCOUNTER — VIRTUAL VISIT (OUTPATIENT)
Dept: PSYCHOLOGY | Facility: CLINIC | Age: 31
End: 2021-12-16
Payer: COMMERCIAL

## 2021-12-16 DIAGNOSIS — F84.0 AUTISM SPECTRUM DISORDER: ICD-10-CM

## 2021-12-16 DIAGNOSIS — F41.9 ANXIETY DISORDER, UNSPECIFIED TYPE: Primary | ICD-10-CM

## 2021-12-16 DIAGNOSIS — F50.9 EATING DISORDER, UNSPECIFIED TYPE: ICD-10-CM

## 2021-12-16 PROCEDURE — 90832 PSYTX W PT 30 MINUTES: CPT | Mod: GT | Performed by: SOCIAL WORKER
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
## 2021-12-21 ENCOUNTER — LAB (OUTPATIENT)
Dept: URGENT CARE | Facility: URGENT CARE | Age: 31
End: 2021-12-21
Attending: INTERNAL MEDICINE
Payer: COMMERCIAL

## 2021-12-21 DIAGNOSIS — J01.91 ACUTE RECURRENT SINUSITIS, UNSPECIFIED LOCATION: ICD-10-CM

## 2021-12-21 LAB
FLUAV AG SPEC QL IA: NEGATIVE
FLUBV AG SPEC QL IA: NEGATIVE
SARS-COV-2 RNA RESP QL NAA+PROBE: NEGATIVE

## 2021-12-21 PROCEDURE — U0005 INFEC AGEN DETEC AMPLI PROBE: HCPCS

## 2021-12-21 PROCEDURE — 87804 INFLUENZA ASSAY W/OPTIC: CPT

## 2021-12-21 PROCEDURE — U0003 INFECTIOUS AGENT DETECTION BY NUCLEIC ACID (DNA OR RNA); SEVERE ACUTE RESPIRATORY SYNDROME CORONAVIRUS 2 (SARS-COV-2) (CORONAVIRUS DISEASE [COVID-19]), AMPLIFIED PROBE TECHNIQUE, MAKING USE OF HIGH THROUGHPUT TECHNOLOGIES AS DESCRIBED BY CMS-2020-01-R: HCPCS

## 2021-12-26 DIAGNOSIS — M79.7 FIBROMYALGIA: ICD-10-CM

## 2022-01-04 ASSESSMENT — ANXIETY QUESTIONNAIRES
GAD7 TOTAL SCORE: 3
2. NOT BEING ABLE TO STOP OR CONTROL WORRYING: NOT AT ALL
GAD7 TOTAL SCORE: 3
4. TROUBLE RELAXING: SEVERAL DAYS
3. WORRYING TOO MUCH ABOUT DIFFERENT THINGS: SEVERAL DAYS
7. FEELING AFRAID AS IF SOMETHING AWFUL MIGHT HAPPEN: NOT AT ALL
7. FEELING AFRAID AS IF SOMETHING AWFUL MIGHT HAPPEN: NOT AT ALL
5. BEING SO RESTLESS THAT IT IS HARD TO SIT STILL: NOT AT ALL
6. BECOMING EASILY ANNOYED OR IRRITABLE: NOT AT ALL
1. FEELING NERVOUS, ANXIOUS, OR ON EDGE: SEVERAL DAYS
GAD7 TOTAL SCORE: 3

## 2022-01-05 ENCOUNTER — VIRTUAL VISIT (OUTPATIENT)
Dept: PSYCHOLOGY | Facility: CLINIC | Age: 32
End: 2022-01-05
Payer: COMMERCIAL

## 2022-01-05 DIAGNOSIS — F84.0 AUTISM SPECTRUM DISORDER: ICD-10-CM

## 2022-01-05 DIAGNOSIS — F41.9 ANXIETY DISORDER, UNSPECIFIED TYPE: Primary | ICD-10-CM

## 2022-01-05 DIAGNOSIS — F50.9 EATING DISORDER, UNSPECIFIED TYPE: ICD-10-CM

## 2022-01-05 PROCEDURE — 90834 PSYTX W PT 45 MINUTES: CPT | Mod: GT | Performed by: SOCIAL WORKER

## 2022-01-05 ASSESSMENT — PATIENT HEALTH QUESTIONNAIRE - PHQ9: SUM OF ALL RESPONSES TO PHQ QUESTIONS 1-9: 4

## 2022-01-05 ASSESSMENT — ANXIETY QUESTIONNAIRES: GAD7 TOTAL SCORE: 3
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
## 2022-01-10 DIAGNOSIS — E03.9 ACQUIRED HYPOTHYROIDISM: ICD-10-CM

## 2022-01-12 RX ORDER — LEVOTHYROXINE SODIUM 100 UG/1
TABLET ORAL
Qty: 90 TABLET | Refills: 1 | Status: SHIPPED | OUTPATIENT
Start: 2022-01-12 | End: 2022-05-25

## 2022-01-12 NOTE — TELEPHONE ENCOUNTER
"Last Written Prescription Date:  10/19/21  Last Fill Quantity: 30,  # refills: 2   Last office visit provider:  5/17/21     Requested Prescriptions   Pending Prescriptions Disp Refills     levothyroxine (SYNTHROID/LEVOTHROID) 100 MCG tablet [Pharmacy Med Name: LEVOTHYROXINE 0.100MG (100MCG) TAB] 30 tablet 2     Sig: TAKE 1 TABLET(100 MCG) BY MOUTH DAILY       Thyroid Protocol Passed - 1/10/2022  3:49 AM        Passed - Patient is 12 years or older        Passed - Recent (12 mo) or future (30 days) visit within the authorizing provider's specialty     Patient has had an office visit with the authorizing provider or a provider within the authorizing providers department within the previous 12 mos or has a future within next 30 days. See \"Patient Info\" tab in inbasket, or \"Choose Columns\" in Meds & Orders section of the refill encounter.              Passed - Medication is active on med list        Passed - Normal TSH on file in past 12 months     Recent Labs   Lab Test 11/12/21  1542   TSH 0.56              Passed - No active pregnancy on record     If patient is pregnant or has had a positive pregnancy test, please check TSH.          Passed - No positive pregnancy test in past 12 months     If patient is pregnant or has had a positive pregnancy test, please check TSH.               Aniceto Tobar RN 01/12/22 1:50 PM  "

## 2022-01-14 ENCOUNTER — MYC MEDICAL ADVICE (OUTPATIENT)
Dept: INTERNAL MEDICINE | Facility: CLINIC | Age: 32
End: 2022-01-14
Payer: COMMERCIAL

## 2022-01-14 DIAGNOSIS — Z78.9 USES BIRTH CONTROL: Primary | ICD-10-CM

## 2022-01-14 RX ORDER — TIMOLOL MALEATE 5 MG/ML
1 SOLUTION/ DROPS OPHTHALMIC DAILY
Qty: 90 TABLET | Refills: 3 | Status: SHIPPED | OUTPATIENT
Start: 2022-01-14 | End: 2022-05-25

## 2022-01-20 ENCOUNTER — MYC MEDICAL ADVICE (OUTPATIENT)
Dept: INTERNAL MEDICINE | Facility: CLINIC | Age: 32
End: 2022-01-20
Payer: COMMERCIAL

## 2022-01-20 DIAGNOSIS — E03.9 ACQUIRED HYPOTHYROIDISM: ICD-10-CM

## 2022-01-20 DIAGNOSIS — Z78.9 USES BIRTH CONTROL: ICD-10-CM

## 2022-01-20 DIAGNOSIS — F41.9 ANXIETY: Primary | ICD-10-CM

## 2022-01-21 RX ORDER — ESCITALOPRAM OXALATE 10 MG/1
10 TABLET ORAL DAILY
Qty: 90 TABLET | Refills: 2 | Status: SHIPPED | OUTPATIENT
Start: 2022-01-21 | End: 2022-05-09

## 2022-01-25 ENCOUNTER — TELEPHONE (OUTPATIENT)
Dept: INTERNAL MEDICINE | Facility: CLINIC | Age: 32
End: 2022-01-25
Payer: COMMERCIAL

## 2022-01-27 NOTE — TELEPHONE ENCOUNTER
Prior Authorization Not Needed per Insurance    Medication: Vienva 0.1-20MG-MCG tablets  Insurance Company: [employer] - Phone [phone] Fax [phone]  Expected CoPay:      Pharmacy Filling the Rx: [id number] - [place], [place] - [address] AT [facility]  Pharmacy Notified: Yes  Patient Notified: Yes    Spoke with pharmacy- they received paid claim.  Drug is covered by current benefit plan. No further PA activity needed

## 2022-02-09 ENCOUNTER — MYC MEDICAL ADVICE (OUTPATIENT)
Dept: INTERNAL MEDICINE | Facility: CLINIC | Age: 32
End: 2022-02-09
Payer: COMMERCIAL

## 2022-02-09 ASSESSMENT — ANXIETY QUESTIONNAIRES
3. WORRYING TOO MUCH ABOUT DIFFERENT THINGS: MORE THAN HALF THE DAYS
7. FEELING AFRAID AS IF SOMETHING AWFUL MIGHT HAPPEN: SEVERAL DAYS
2. NOT BEING ABLE TO STOP OR CONTROL WORRYING: SEVERAL DAYS
1. FEELING NERVOUS, ANXIOUS, OR ON EDGE: MORE THAN HALF THE DAYS
5. BEING SO RESTLESS THAT IT IS HARD TO SIT STILL: SEVERAL DAYS
4. TROUBLE RELAXING: MORE THAN HALF THE DAYS
6. BECOMING EASILY ANNOYED OR IRRITABLE: SEVERAL DAYS
GAD7 TOTAL SCORE: 10
7. FEELING AFRAID AS IF SOMETHING AWFUL MIGHT HAPPEN: SEVERAL DAYS

## 2022-02-10 ASSESSMENT — ANXIETY QUESTIONNAIRES: GAD7 TOTAL SCORE: 10

## 2022-02-16 ENCOUNTER — VIRTUAL VISIT (OUTPATIENT)
Dept: PSYCHOLOGY | Facility: CLINIC | Age: 32
End: 2022-02-16
Payer: COMMERCIAL

## 2022-02-16 DIAGNOSIS — F41.9 ANXIETY DISORDER, UNSPECIFIED TYPE: Primary | ICD-10-CM

## 2022-02-16 DIAGNOSIS — F84.0 AUTISM SPECTRUM DISORDER: ICD-10-CM

## 2022-02-16 DIAGNOSIS — F50.9 EATING DISORDER, UNSPECIFIED TYPE: ICD-10-CM

## 2022-02-16 PROCEDURE — 90834 PSYTX W PT 45 MINUTES: CPT | Mod: 95 | Performed by: SOCIAL WORKER

## 2022-02-16 ASSESSMENT — PATIENT HEALTH QUESTIONNAIRE - PHQ9: SUM OF ALL RESPONSES TO PHQ QUESTIONS 1-9: 4
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
## 2022-02-22 ENCOUNTER — VIRTUAL VISIT (OUTPATIENT)
Dept: PSYCHOLOGY | Facility: CLINIC | Age: 32
End: 2022-02-22
Payer: COMMERCIAL

## 2022-02-22 DIAGNOSIS — F84.0 AUTISM SPECTRUM DISORDER: ICD-10-CM

## 2022-02-22 DIAGNOSIS — F41.9 ANXIETY DISORDER, UNSPECIFIED TYPE: Primary | ICD-10-CM

## 2022-02-22 DIAGNOSIS — F50.9 EATING DISORDER, UNSPECIFIED TYPE: ICD-10-CM

## 2022-02-22 PROCEDURE — 90834 PSYTX W PT 45 MINUTES: CPT | Mod: 95 | Performed by: SOCIAL WORKER

## 2022-02-22 ASSESSMENT — PATIENT HEALTH QUESTIONNAIRE - PHQ9
SUM OF ALL RESPONSES TO PHQ QUESTIONS 1-9: 8
10. IF YOU CHECKED OFF ANY PROBLEMS, HOW DIFFICULT HAVE THESE PROBLEMS MADE IT FOR YOU TO DO YOUR WORK, TAKE CARE OF THINGS AT HOME, OR GET ALONG WITH OTHER PEOPLE: SOMEWHAT DIFFICULT
SUM OF ALL RESPONSES TO PHQ QUESTIONS 1-9: 8
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
## 2022-02-23 ENCOUNTER — DOCUMENTATION ONLY (OUTPATIENT)
Dept: PSYCHOLOGY | Facility: OTHER | Age: 32
End: 2022-02-23
Payer: COMMERCIAL

## 2022-02-23 ASSESSMENT — PATIENT HEALTH QUESTIONNAIRE - PHQ9: SUM OF ALL RESPONSES TO PHQ QUESTIONS 1-9: 8

## 2022-02-24 DIAGNOSIS — M79.7 FIBROMYALGIA: ICD-10-CM

## 2022-02-24 NOTE — PROGRESS NOTES
Patient's documentation to [employer] regarding requested work accomodation has been completed by this writer and submitted to Zofia Riojas at [employer] via email.  Document will be submitted to scanning for patient file.

## 2022-02-25 ASSESSMENT — ANXIETY QUESTIONNAIRES
7. FEELING AFRAID AS IF SOMETHING AWFUL MIGHT HAPPEN: SEVERAL DAYS
GAD7 TOTAL SCORE: 12
4. TROUBLE RELAXING: SEVERAL DAYS
2. NOT BEING ABLE TO STOP OR CONTROL WORRYING: NEARLY EVERY DAY
3. WORRYING TOO MUCH ABOUT DIFFERENT THINGS: NEARLY EVERY DAY
GAD7 TOTAL SCORE: 12
GAD7 TOTAL SCORE: 12
5. BEING SO RESTLESS THAT IT IS HARD TO SIT STILL: SEVERAL DAYS
1. FEELING NERVOUS, ANXIOUS, OR ON EDGE: NEARLY EVERY DAY
7. FEELING AFRAID AS IF SOMETHING AWFUL MIGHT HAPPEN: SEVERAL DAYS
6. BECOMING EASILY ANNOYED OR IRRITABLE: NOT AT ALL

## 2022-02-25 ASSESSMENT — PATIENT HEALTH QUESTIONNAIRE - PHQ9
SUM OF ALL RESPONSES TO PHQ QUESTIONS 1-9: 13
SUM OF ALL RESPONSES TO PHQ QUESTIONS 1-9: 13
10. IF YOU CHECKED OFF ANY PROBLEMS, HOW DIFFICULT HAVE THESE PROBLEMS MADE IT FOR YOU TO DO YOUR WORK, TAKE CARE OF THINGS AT HOME, OR GET ALONG WITH OTHER PEOPLE: VERY DIFFICULT

## 2022-02-25 NOTE — TELEPHONE ENCOUNTER
Routing refill request to provider for review/approval because:  Drug not on the [facility] refill protocol     Last Written Prescription Date:  12/27/2021  Last Fill Quantity: 90,  # refills: 1   Last office visit provider:  09/28/2021     Requested Prescriptions   Pending Prescriptions Disp Refills     tiZANidine (ZANAFLEX) 4 MG tablet [Pharmacy Med Name: TIZANIDINE 4MG TABLETS] 90 tablet 1     Sig: TAKE 3 TABLETS BY MOUTH AT BEDTIME       There is no refill protocol information for this order          Vesta Parson RN 02/25/22 7:53 AM

## 2022-02-26 ASSESSMENT — PATIENT HEALTH QUESTIONNAIRE - PHQ9: SUM OF ALL RESPONSES TO PHQ QUESTIONS 1-9: 13

## 2022-02-26 ASSESSMENT — ANXIETY QUESTIONNAIRES: GAD7 TOTAL SCORE: 12

## 2022-03-01 ENCOUNTER — MYC MEDICAL ADVICE (OUTPATIENT)
Dept: INTERNAL MEDICINE | Facility: CLINIC | Age: 32
End: 2022-03-01
Payer: COMMERCIAL

## 2022-03-03 ENCOUNTER — TELEPHONE (OUTPATIENT)
Dept: INTERNAL MEDICINE | Facility: CLINIC | Age: 32
End: 2022-03-03

## 2022-03-03 ENCOUNTER — VIRTUAL VISIT (OUTPATIENT)
Dept: INTERNAL MEDICINE | Facility: CLINIC | Age: 32
End: 2022-03-03
Payer: COMMERCIAL

## 2022-03-03 DIAGNOSIS — F84.0 AUTISM: ICD-10-CM

## 2022-03-03 DIAGNOSIS — F43.10 PTSD (POST-TRAUMATIC STRESS DISORDER): ICD-10-CM

## 2022-03-03 DIAGNOSIS — F41.1 GENERALIZED ANXIETY DISORDER: Primary | ICD-10-CM

## 2022-03-03 DIAGNOSIS — F40.10 SOCIAL ANXIETY DISORDER: ICD-10-CM

## 2022-03-03 DIAGNOSIS — F33.41 RECURRENT MAJOR DEPRESSIVE DISORDER, IN PARTIAL REMISSION (H): ICD-10-CM

## 2022-03-03 PROCEDURE — 99213 OFFICE O/P EST LOW 20 MIN: CPT | Mod: 95 | Performed by: INTERNAL MEDICINE

## 2022-03-03 RX ORDER — BUSPIRONE HYDROCHLORIDE 5 MG/1
5 TABLET ORAL 2 TIMES DAILY
Qty: 60 TABLET | Refills: 3 | Status: SHIPPED | OUTPATIENT
Start: 2022-03-03 | End: 2022-07-25
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
## 2022-03-07 ASSESSMENT — PATIENT HEALTH QUESTIONNAIRE - PHQ9
SUM OF ALL RESPONSES TO PHQ QUESTIONS 1-9: 4
SUM OF ALL RESPONSES TO PHQ QUESTIONS 1-9: 4
10. IF YOU CHECKED OFF ANY PROBLEMS, HOW DIFFICULT HAVE THESE PROBLEMS MADE IT FOR YOU TO DO YOUR WORK, TAKE CARE OF THINGS AT HOME, OR GET ALONG WITH OTHER PEOPLE: SOMEWHAT DIFFICULT

## 2022-03-08 ENCOUNTER — VIRTUAL VISIT (OUTPATIENT)
Dept: PSYCHOLOGY | Facility: CLINIC | Age: 32
End: 2022-03-08
Payer: COMMERCIAL

## 2022-03-08 ENCOUNTER — MEDICAL CORRESPONDENCE (OUTPATIENT)
Dept: HEALTH INFORMATION MANAGEMENT | Facility: CLINIC | Age: 32
End: 2022-03-08
Payer: COMMERCIAL

## 2022-03-08 DIAGNOSIS — F84.0 AUTISM SPECTRUM DISORDER: ICD-10-CM

## 2022-03-08 DIAGNOSIS — F41.9 ANXIETY DISORDER, UNSPECIFIED TYPE: Primary | ICD-10-CM

## 2022-03-08 DIAGNOSIS — F50.9 EATING DISORDER, UNSPECIFIED TYPE: ICD-10-CM

## 2022-03-08 PROCEDURE — 90834 PSYTX W PT 45 MINUTES: CPT | Mod: GT | Performed by: SOCIAL WORKER

## 2022-03-08 ASSESSMENT — ANXIETY QUESTIONNAIRES
7. FEELING AFRAID AS IF SOMETHING AWFUL MIGHT HAPPEN: NOT AT ALL
GAD7 TOTAL SCORE: 4
3. WORRYING TOO MUCH ABOUT DIFFERENT THINGS: SEVERAL DAYS
4. TROUBLE RELAXING: SEVERAL DAYS
6. BECOMING EASILY ANNOYED OR IRRITABLE: NOT AT ALL
5. BEING SO RESTLESS THAT IT IS HARD TO SIT STILL: NOT AT ALL
2. NOT BEING ABLE TO STOP OR CONTROL WORRYING: SEVERAL DAYS
1. FEELING NERVOUS, ANXIOUS, OR ON EDGE: SEVERAL DAYS

## 2022-03-08 ASSESSMENT — PATIENT HEALTH QUESTIONNAIRE - PHQ9: SUM OF ALL RESPONSES TO PHQ QUESTIONS 1-9: 4

## 2022-03-09 ENCOUNTER — MYC MEDICAL ADVICE (OUTPATIENT)
Dept: INTERNAL MEDICINE | Facility: CLINIC | Age: 32
End: 2022-03-09
Payer: COMMERCIAL

## 2022-03-09 ASSESSMENT — ANXIETY QUESTIONNAIRES: GAD7 TOTAL SCORE: 4

## 2022-03-10 ENCOUNTER — VIRTUAL VISIT (OUTPATIENT)
Dept: INTERNAL MEDICINE | Facility: CLINIC | Age: 32
End: 2022-03-10
Payer: COMMERCIAL

## 2022-03-10 ENCOUNTER — LAB (OUTPATIENT)
Dept: LAB | Facility: CLINIC | Age: 32
End: 2022-03-10
Payer: COMMERCIAL

## 2022-03-10 DIAGNOSIS — F43.10 PTSD (POST-TRAUMATIC STRESS DISORDER): ICD-10-CM

## 2022-03-10 DIAGNOSIS — F33.42 RECURRENT MAJOR DEPRESSIVE EPISODES, IN FULL REMISSION (H): ICD-10-CM

## 2022-03-10 DIAGNOSIS — Z31.83 ENCOUNTER FOR ASSISTED REPRODUCTIVE FERTILITY CYCLE: Primary | ICD-10-CM

## 2022-03-10 DIAGNOSIS — F84.0 AUTISM: Primary | ICD-10-CM

## 2022-03-10 LAB
B-HCG SERPL-ACNC: <1 IU/L (ref 0–5)
ESTRADIOL SERPL-MCNC: 42 PG/ML
FSH SERPL-ACNC: 5.5 IU/L (ref 0.7–10.8)
LH SERPL-ACNC: 1.9 IU/L (ref 1.5–54)
PROGEST SERPL-MCNC: 0.4 NG/ML (ref 0.2–25.6)
TSH SERPL DL<=0.005 MIU/L-ACNC: 0.81 MU/L (ref 0.4–4)

## 2022-03-10 PROCEDURE — 83002 ASSAY OF GONADOTROPIN (LH): CPT

## 2022-03-10 PROCEDURE — 99212 OFFICE O/P EST SF 10 MIN: CPT | Mod: GT | Performed by: INTERNAL MEDICINE

## 2022-03-10 PROCEDURE — 84443 ASSAY THYROID STIM HORMONE: CPT

## 2022-03-10 PROCEDURE — 83001 ASSAY OF GONADOTROPIN (FSH): CPT

## 2022-03-10 PROCEDURE — 36415 COLL VENOUS BLD VENIPUNCTURE: CPT

## 2022-03-10 PROCEDURE — 84144 ASSAY OF PROGESTERONE: CPT

## 2022-03-10 PROCEDURE — 82670 ASSAY OF TOTAL ESTRADIOL: CPT

## 2022-03-10 PROCEDURE — 84702 CHORIONIC GONADOTROPIN TEST: CPT
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
## 2022-03-11 ENCOUNTER — TELEPHONE (OUTPATIENT)
Dept: INTERNAL MEDICINE | Facility: CLINIC | Age: 32
End: 2022-03-11
Payer: COMMERCIAL

## 2022-03-14 NOTE — TELEPHONE ENCOUNTER
Please fax signed letter ( my outbox) to  letter to [phone], and address it as ATTN: Adilene Cerda

## 2022-03-14 NOTE — TELEPHONE ENCOUNTER
Please fax letter (signed in my outbox)     to [phone], and address it as ATTN: Adilene Cerda

## 2022-03-16 ENCOUNTER — LAB (OUTPATIENT)
Dept: LAB | Facility: CLINIC | Age: 32
End: 2022-03-16
Payer: COMMERCIAL

## 2022-03-16 DIAGNOSIS — Z31.83 ENCOUNTER FOR ASSISTED REPRODUCTIVE FERTILITY CYCLE: ICD-10-CM

## 2022-03-16 LAB
ESTRADIOL SERPL-MCNC: 1639 PG/ML
FSH SERPL-ACNC: 25.2 IU/L (ref 0.7–10.8)
LH SERPL-ACNC: 0.3 IU/L (ref 1.5–54)
PROGEST SERPL-MCNC: 0.5 NG/ML (ref 0.2–25.6)

## 2022-03-16 PROCEDURE — 82670 ASSAY OF TOTAL ESTRADIOL: CPT

## 2022-03-16 PROCEDURE — 83002 ASSAY OF GONADOTROPIN (LH): CPT

## 2022-03-16 PROCEDURE — 36415 COLL VENOUS BLD VENIPUNCTURE: CPT

## 2022-03-16 PROCEDURE — 83001 ASSAY OF GONADOTROPIN (FSH): CPT

## 2022-03-16 PROCEDURE — 84144 ASSAY OF PROGESTERONE: CPT

## 2022-03-22 NOTE — PROGRESS NOTES
[facility]                                     Progress Note    Patient Name: Mikaela Vernon  Date: 3/8/2022         Service Type: Individual      Session Start Time: 11:00 am       Session End Time:  11:50 am     Session Length:   50 minutes    Session #: 18    Attendees: Client attended alone    Service Modality:  Video Visit:      Provider verified identity through the following two step process.  Patient provided:  Patient is known previously to provider    Telemedicine Visit: The patient's condition can be safely assessed and treated via synchronous audio and visual telemedicine encounter.      Reason for Telemedicine Visit: Services only offered telehealth    Originating Site (Patient Location): Patient's home    Distant Site (Provider Location): Provider Remote Setting- Home Office    Consent:  The patient/guardian has verbally consented to: the potential risks and benefits of telemedicine (video visit) versus in person care; bill my insurance or make self-payment for services provided; and responsibility for payment of non-covered services.     Patient would like the video invitation sent by:  My Chart    Mode of Communication:  Video Conference via Amwell    As the provider I attest to compliance with applicable laws and regulations related to telemedicine.    DATA  Interactive Complexity: No  Crisis: No        Progress Since Last Session (Related to Symptoms / Goals / Homework):   Symptoms: Improving Reports reduced anxiety and depression symptoms.      Homework: Achieved / completed to satisfaction  Patient reports working with HR on accommodations and work challenges.       Episode of Care Goals: Satisfactory progress - ACTION (Actively working towards change); Intervened by reinforcing change plan / affirming steps taken     Current / Ongoing Stressors and Concerns:   Patient reports a history of multiple mental health Dx.  She reports that she was diagnosed at an older age with  Autism Spectrum Disorder.  Patient reports a history of challenges with eating, reporting a Dx of ARFID.  She reports struggling having a wide variety of foods in her diet.  Patient reported 4/16/2021 that she plans to start a new job as  at the [employer].   Patient reported 4/28/2021 she has started the position, reports feeling positive about this change.  On 6/2/2021 patient noted some positive feedback from her supervisor.    Patient contacted writer a few days prior to 8/18/2021 session reporting she was struggling with stress at work and wanted to an extra session.  Patient explianed she had been written up at work, she reports she had tried to resolve issues, noting her ASD symptoms make communication challenging at times.  Patient reports she doesn't always know how others may be interpreting her communication.    Patient contacted writer early week of 2/14/2022 regarding completing an accomodation form for patient for work.  Patient reports some struggles with performance at work. Reports struggles to communicate and read social cues in the work setting.    Patient reported 2/22/2022 that her oldest dog passed away and that she took time off from work.  Patient reported 2/22/2022 continued challenges at work.  She reports feeling overwhelmed when she receives many different tasks at one time to complete.         Treatment Objective(s) Addressed in This Session:   identify at least 1 triggers for anxiety  Decrease frequency and intensity of feeling down, depressed, hopeless  Identify negative self-talk and behaviors: challenge core beliefs, myths, and actions  Patient reports stress at work a trigger for anxiety.       Intervention:   CBT: Restructured anxious and negative cognitions.  Solution Focused: Discussed options for patient to work through [employer] with current employment.     Assessments completed prior to visit:  The following assessments were completed by patient for this  visit:  PHQ9:   PHQ-9 SCORE 10/4/2021 11/29/2021 1/4/2022 2/15/2022 2/22/2022 2/25/2022 3/7/2022   PHQ-9 Total Score MyChart 4 (Minimal depression) 5 (Mild depression) 4 (Minimal depression) 4 (Minimal depression) 8 (Mild depression) 13 (Moderate depression) 4 (Minimal depression)   PHQ-9 Total Score 4 5 4 4 8 13 4     GAD7:   KAYLAN-7 SCORE 10/4/2021 11/29/2021 12/15/2021 1/4/2022 2/9/2022 2/25/2022 3/8/2022   Total Score 4 (minimal anxiety) 4 (minimal anxiety) 4 (minimal anxiety) 3 (minimal anxiety) 10 (moderate anxiety) 12 (moderate anxiety) -   Total Score 4 4 4 3 10 12 4     PROMIS 10-Global Health (all questions and answers displayed):   PROMIS 10 12/15/2021 1/4/2022   In general, would you say your health is: Fair Fair   In general, would you say your quality of life is: Fair Fair   In general, how would you rate your physical health? Fair Fair   In general, how would you rate your mental health, including your mood and your ability to think? Fair Fair   In general, how would you rate your satisfaction with your social activities and relationships? Fair Fair   In general, please rate how well you carry out your usual social activities and roles Fair Fair   To what extent are you able to carry out your everyday physical activities such as walking, climbing stairs, carrying groceries, or moving a chair? Moderately A little   How often have you been bothered by emotional problems such as feeling anxious, depressed or irritable? Often Often   How would you rate your fatigue on average? Moderate Moderate   How would you rate your pain on average?   0 = No Pain  to  10 = Worst Imaginable Pain 4 4   In general, would you say your health is: 2 2   In general, would you say your quality of life is: 2 2   In general, how would you rate your physical health? 2 2   In general, how would you rate your mental health, including your mood and your ability to think? 2 2   In general, how would you rate your satisfaction with  your social activities and relationships? 2 2   In general, please rate how well you carry out your usual social activities and roles. (This includes activities at home, at work and in your community, and responsibilities as a parent, child, spouse, employee, friend, etc.) 2 2   To what extent are you able to carry out your everyday physical activities such as walking, climbing stairs, carrying groceries, or moving a chair? 3 2   In the past 7 days, how often have you been bothered by emotional problems such as feeling anxious, depressed, or irritable? 4 4   In the past 7 days, how would you rate your fatigue on average? 3 3   In the past 7 days, how would you rate your pain on average, where 0 means no pain, and 10 means worst imaginable pain? 4 4   Global Mental Health Score 8 8   Global Physical Health Score 11 10   PROMIS TOTAL - SUBSCORES 19 18   Some recent data might be hidden         ASSESSMENT: Current Emotional / Mental Status (status of significant symptoms):   Risk status (Self / Other harm or suicidal ideation)   Patient denies current fears or concerns for personal safety.   Patient denies current or recent suicidal ideation or behaviors.   Patient denies current or recent homicidal ideation or behaviors.   Patient denies current or recent self injurious behavior or ideation.   Patient denies other safety concerns.   Patient reports there has been no change in risk factors since their last session.     Patient reports there has been no change in protective factors since their last session.     Recommended that patient call 911 or go to the local ED should there be a change in any of these risk factors.     Appearance:   Appropriate    Eye Contact:   Good    Psychomotor Behavior: Normal    Attitude:   Cooperative    Orientation:   All   Speech    Rate / Production: Normal/ Responsive Normal     Volume:  Normal    Mood:    Anxious  Depressed    Affect:    Appropriate    Thought Content:  Perservative   Rumination    Thought Form:  Coherent  Logical  Circumstantial   Insight:    Good  and Fair      Medication Review:   No changes to current psychiatric medication(s)     Medication Compliance:   Yes     Changes in Health Issues:   None reported     Chemical Use Review:   Substance Use: Chemical use reviewed, no active concerns identified      Tobacco Use: No current tobacco use.      Diagnosis:  1. Anxiety disorder, unspecified type    2. Eating disorder, unspecified type    3. Autism spectrum disorder        Collateral Reports Completed:   Not Applicable    PLAN: (Patient Tasks / Therapist Tasks / Other)  Continue therapy on every other week basis, patient to continue self-care when she is on leave from work.          Bria Peacock, [facility]                                                         ______________________________________________________________________    Individual Treatment Plan    Patient's Name: Mikaela Vernon  YOB: 1990    Date of Creation: 4/16/2021  Date Treatment Plan Last Reviewed/Revised: 3/8/2022    DSM5 Diagnoses: Autism Spectrum Disorder 299.00(F84.0)  Associated with another neurodevelopmental, mental or behavioral disorder, 300.00 (F41.9) Unspecified Anxiety Disorder or 307.50 (F50.9) Unspecified Feeding or Eating Disorder  Psychosocial / Contextual Factors: Started new job 2022, single, conflicted / distant relationship with some extended family members.    PROMIS (reviewed every 90 days): 1/4/2022 score 18    Referral / Collaboration:  Referral to another professional/service is not indicated at this time..    Anticipated number of session for this episode of care: Over 15  Anticipation frequency of session: Every other week  Anticipated Duration of each session: 38-52 minutes  Treatment plan will be reviewed in 90 days or when goals have been changed.       MeasurableTreatment Goal(s) related to diagnosis / functional impairment(s)  Goal 1: Patient will focus on  including healthy foods in her diet and trying new foods.      I will know I've met my goal when find something outside of my food comfort zone that I can eat on a more regular basis (ex. E/O week or 1x month).      Objective #A (Patient Action)    Patient will Patient to try to increase variety of foods and eat enough calories per day.  .  Status: Continued - Date(s):   3/8/2022    Intervention(s)  Therapist will assign homework Patient to identify new foods she would be willing to eat.  .    Objective #B  Patient will Improve diet, appetite, mindful eating, and / or meal planning.  Status: Continued - Date(s):  3/8/2022    Intervention(s)  Therapist will Discuss in session patient's eating patterns and help patient problem solve challenges with eating.  .    Goal 2: Client will set appropriate boundaries with others, patient will take time before responding in certain situations.         I will know I've met my goal when I am more comfortable saying no and with conflict resolution .      Objective #A (Client Action)    Client will learn & utilize at least 1 assertive communication skills weekly.  Status: New - Date: 3/8/2022     Intervention(s)  Therapist will teach assertiveness skills. DBT Interpersonal Effectiveness.    Objective #B  Client will compile a list of boundaries that they would like to set with others. Boundaries with friends and family.  .    Status: New - Date:  3/8/2022     Intervention(s)  Therapist will teach about healthy boundaries. Discuss healthy vs unhealthy boundaries.           Patient has reviewed and agreed to the above plan.      Bria Peacock, [facility]  March 8, 2022

## 2022-03-28 ENCOUNTER — VIRTUAL VISIT (OUTPATIENT)
Dept: PSYCHOLOGY | Facility: CLINIC | Age: 32
End: 2022-03-28
Payer: COMMERCIAL

## 2022-03-28 DIAGNOSIS — F41.9 ANXIETY DISORDER, UNSPECIFIED TYPE: Primary | ICD-10-CM

## 2022-03-28 DIAGNOSIS — F84.0 AUTISM SPECTRUM DISORDER: ICD-10-CM

## 2022-03-28 DIAGNOSIS — F50.9 EATING DISORDER, UNSPECIFIED TYPE: ICD-10-CM

## 2022-03-28 PROCEDURE — 90834 PSYTX W PT 45 MINUTES: CPT | Mod: GT | Performed by: SOCIAL WORKER

## 2022-03-28 ASSESSMENT — ANXIETY QUESTIONNAIRES
2. NOT BEING ABLE TO STOP OR CONTROL WORRYING: SEVERAL DAYS
7. FEELING AFRAID AS IF SOMETHING AWFUL MIGHT HAPPEN: NOT AT ALL
6. BECOMING EASILY ANNOYED OR IRRITABLE: NOT AT ALL
4. TROUBLE RELAXING: SEVERAL DAYS
GAD7 TOTAL SCORE: 3
GAD7 TOTAL SCORE: 3
1. FEELING NERVOUS, ANXIOUS, OR ON EDGE: NOT AT ALL
GAD7 TOTAL SCORE: 3
3. WORRYING TOO MUCH ABOUT DIFFERENT THINGS: SEVERAL DAYS
5. BEING SO RESTLESS THAT IT IS HARD TO SIT STILL: NOT AT ALL
7. FEELING AFRAID AS IF SOMETHING AWFUL MIGHT HAPPEN: NOT AT ALL

## 2022-03-28 NOTE — PROGRESS NOTES
[facility]                                     Progress Note    Patient Name: Mikaela Vernon  Date: 3/28/2022         Service Type: Individual      Session Start Time: 11:05 am       Session End Time:  11:55 am     Session Length:   50 minutes    Session #: 19    Attendees: Client attended alone    Service Modality:  Video Visit:      Provider verified identity through the following two step process.  Patient provided:  Patient is known previously to provider    Telemedicine Visit: The patient's condition can be safely assessed and treated via synchronous audio and visual telemedicine encounter.      Reason for Telemedicine Visit: Services only offered telehealth    Originating Site (Patient Location): Patient's home    Distant Site (Provider Location): Provider Remote Setting- Home Office    Consent:  The patient/guardian has verbally consented to: the potential risks and benefits of telemedicine (video visit) versus in person care; bill my insurance or make self-payment for services provided; and responsibility for payment of non-covered services.     Patient would like the video invitation sent by:  My Chart    Mode of Communication:  Video Conference via Amwell    As the provider I attest to compliance with applicable laws and regulations related to telemedicine.    DATA  Interactive Complexity: No  Crisis: No        Progress Since Last Session (Related to Symptoms / Goals / Homework):   Symptoms: Improving Reports reduced anxiety and depression symptoms.      Homework: Achieved / completed to satisfaction  Patient reports working with HR on work challenges, reports is looking at positions at work.        Episode of Care Goals: Satisfactory progress - ACTION (Actively working towards change); Intervened by reinforcing change plan / affirming steps taken     Current / Ongoing Stressors and Concerns:   Patient reports a history of multiple mental health Dx.  She reports that she was  diagnosed at an older age with Autism Spectrum Disorder.  Patient reports a history of challenges with eating, reporting a Dx of ARFID.  She reports struggling having a wide variety of foods in her diet.  Patient reported 4/16/2021 that she plans to start a new job as  at the [employer].   Patient reported 4/28/2021 she has started the position, reports feeling positive about this change.  On 6/2/2021 patient noted some positive feedback from her supervisor.    Patient contacted writer a few days prior to 8/18/2021 session reporting she was struggling with stress at work and wanted to an extra session.  Patient explianed she had been written up at work, she reports she had tried to resolve issues, noting her ASD symptoms make communication challenging at times.  Patient reports she doesn't always know how others may be interpreting her communication.    Patient contacted writer early week of 2/14/2022 regarding completing an accomodation form for patient for work.  Patient reports some struggles with performance at work. Reports struggles to communicate and read social cues in the work setting.    Patient reported 2/22/2022 that her oldest dog passed away and that she took time off from work.  Patient reported 2/22/2022 continued challenges at work.  She reports feeling overwhelmed when she receives many different tasks at one time to complete.  She reported 3/28/2022 that she started a new temporary job.    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             Treatment Objective(s) Addressed in This Session:   identify at least 1 triggers for anxiety  Decrease frequency and intensity of feeling down, depressed, hopeless  Identify negative self-talk and behaviors: challenge core beliefs, myths, and actions  Patient reports stress at work a trigger for anxiety.  Patient reports some anxiety with starting her new temporary position.        Intervention:   CBT: Restructured anxious and negative cognitions.  Solution Focused: Discussed options for patient to work through [employer] with current employment.     Assessments completed prior to visit:  The following assessments were completed by patient for this visit:  PHQ9:   PHQ-9 SCORE 11/29/2021 1/4/2022 2/15/2022 2/22/2022 2/25/2022 3/7/2022 3/28/2022   PHQ-9 Total Score MyChart 5 (Mild depression) 4 (Minimal depression) 4 (Minimal depression) 8 (Mild depression) 13 (Moderate depression) 4 (Minimal depression) 4 (Minimal depression)   PHQ-9 Total Score 5 4 4 8 13 4 4     GAD7:   KAYLAN-7 SCORE 11/29/2021 12/15/2021 1/4/2022 2/9/2022 2/25/2022 3/8/2022 3/28/2022   Total Score 4 (minimal anxiety) 4 (minimal anxiety) 3 (minimal anxiety) 10 (moderate anxiety) 12 (moderate anxiety) - 3 (minimal anxiety)   Total Score 4 4 3 10 12 4 3     PROMIS 10-Global Health (all questions and answers displayed):   PROMIS 10 12/15/2021 1/4/2022   In general, would you say your health is: Fair Fair   In general, would you say your quality of life is: Fair Fair   In general, how would you rate your physical health? Fair Fair   In general, how would you rate your mental health, including your mood and your ability to think? Fair Fair   In general, how would you rate your satisfaction with your social activities and relationships? Fair Fair   In general, please rate how well you carry out your usual social activities and roles Fair Fair   To what extent are you able to carry out your everyday physical activities such as walking, climbing stairs, carrying groceries, or moving a chair? Moderately A little   How often have you been bothered by emotional problems such as feeling anxious, depressed or irritable? Often Often   How would you rate your fatigue on average? Moderate Moderate   How would you rate your pain on average?   0 = No Pain  to  10 = Worst Imaginable Pain 4 4   In general, would you say your health is: 2 2    In general, would you say your quality of life is: 2 2   In general, how would you rate your physical health? 2 2   In general, how would you rate your mental health, including your mood and your ability to think? 2 2   In general, how would you rate your satisfaction with your social activities and relationships? 2 2   In general, please rate how well you carry out your usual social activities and roles. (This includes activities at home, at work and in your community, and responsibilities as a parent, child, spouse, employee, friend, etc.) 2 2   To what extent are you able to carry out your everyday physical activities such as walking, climbing stairs, carrying groceries, or moving a chair? 3 2   In the past 7 days, how often have you been bothered by emotional problems such as feeling anxious, depressed, or irritable? 4 4   In the past 7 days, how would you rate your fatigue on average? 3 3   In the past 7 days, how would you rate your pain on average, where 0 means no pain, and 10 means worst imaginable pain? 4 4   Global Mental Health Score 8 8   Global Physical Health Score 11 10   PROMIS TOTAL - SUBSCORES 19 18   Some recent data might be hidden         ASSESSMENT: Current Emotional / Mental Status (status of significant symptoms):   Risk status (Self / Other harm or suicidal ideation)   Patient denies current fears or concerns for personal safety.   Patient denies current or recent suicidal ideation or behaviors.   Patient denies current or recent homicidal ideation or behaviors.   Patient denies current or recent self injurious behavior or ideation.   Patient denies other safety concerns.   Patient reports there has been no change in risk factors since their last session.     Patient reports there has been no change in protective factors since their last session.     Recommended that patient call 911 or go to the local ED should there be a change in any of these risk factors.     Appearance:   Appropriate     Eye Contact:   Good    Psychomotor Behavior: Normal    Attitude:   Cooperative    Orientation:   All   Speech    Rate / Production: Normal/ Responsive Normal     Volume:  Normal    Mood:    Anxious    Affect:    Appropriate    Thought Content:  Perservative  Rumination    Thought Form:  Coherent  Logical  Circumstantial   Insight:    Good  and Fair      Medication Review:   No changes to current psychiatric medication(s)     Medication Compliance:   Yes     Changes in Health Issues:   None reported     Chemical Use Review:   Substance Use: Chemical use reviewed, no active concerns identified      Tobacco Use: No current tobacco use.      Diagnosis:  1. Anxiety disorder, unspecified type    2. Eating disorder, unspecified type    3. Autism spectrum disorder        Collateral Reports Completed:   Not Applicable    PLAN: (Patient Tasks / Therapist Tasks / Other)  Continue therapy on every other week basis, patient to continue to search for new jobs.  Patient to continue to try and get a variety of food in her diet.  Patient to continue to focus on activities she enjoys such as spending time with her dogs.        Bria Peacock, [facility]                                                         ______________________________________________________________________    Individual Treatment Plan    Patient's Name: Mikaela Vernon  YOB: 1990    Date of Creation: 4/16/2021  Date Treatment Plan Last Reviewed/Revised: 3/8/2022    DSM5 Diagnoses: Autism Spectrum Disorder 299.00(F84.0)  Associated with another neurodevelopmental, mental or behavioral disorder, 300.00 (F41.9) Unspecified Anxiety Disorder or 307.50 (F50.9) Unspecified Feeding or Eating Disorder  Psychosocial / Contextual Factors: Started new job 2022, single, conflicted / distant relationship with some extended family members.    PROMIS (reviewed every 90 days): 1/4/2022 score 18    Referral / Collaboration:  Referral to another  professional/service is not indicated at this time..    Anticipated number of session for this episode of care: Over 15  Anticipation frequency of session: Every other week  Anticipated Duration of each session: 38-52 minutes  Treatment plan will be reviewed in 90 days or when goals have been changed.       MeasurableTreatment Goal(s) related to diagnosis / functional impairment(s)  Goal 1: Patient will focus on including healthy foods in her diet and trying new foods.      I will know I've met my goal when find something outside of my food comfort zone that I can eat on a more regular basis (ex. E/O week or 1x month).      Objective #A (Patient Action)    Patient will Patient to try to increase variety of foods and eat enough calories per day.  .  Status: Continued - Date(s):   3/8/2022    Intervention(s)  Therapist will assign homework Patient to identify new foods she would be willing to eat.  .    Objective #B  Patient will Improve diet, appetite, mindful eating, and / or meal planning.  Status: Continued - Date(s):  3/8/2022    Intervention(s)  Therapist will Discuss in session patient's eating patterns and help patient problem solve challenges with eating.  .    Goal 2: Client will set appropriate boundaries with others, patient will take time before responding in certain situations.         I will know I've met my goal when I am more comfortable saying no and with conflict resolution .      Objective #A (Client Action)    Client will learn & utilize at least 1 assertive communication skills weekly.  Status: New - Date: 3/8/2022     Intervention(s)  Therapist will teach assertiveness skills. DBT Interpersonal Effectiveness.    Objective #B  Client will compile a list of boundaries that they would like to set with others. Boundaries with friends and family.  .    Status: New - Date:  3/8/2022     Intervention(s)  Therapist will teach about healthy boundaries. Discuss healthy vs unhealthy boundaries.            Patient has reviewed and agreed to the above plan.      Bria Peacock, [facility]  March 8, 2022  Answers for HPI/ROS submitted by the patient on 3/28/2022  If you checked off any problems, how difficult have these problems made it for you to do your work, take care of things at home, or get along with other people?: Somewhat difficult  PHQ9 TOTAL SCORE: 4  KAYLAN 7 TOTAL SCORE: 3

## 2022-03-29 ASSESSMENT — PATIENT HEALTH QUESTIONNAIRE - PHQ9: SUM OF ALL RESPONSES TO PHQ QUESTIONS 1-9: 4

## 2022-03-29 ASSESSMENT — ANXIETY QUESTIONNAIRES: GAD7 TOTAL SCORE: 3

## 2022-04-23 DIAGNOSIS — M79.7 FIBROMYALGIA: ICD-10-CM

## 2022-04-26 NOTE — TELEPHONE ENCOUNTER
Routing refill request to provider for review/approval because:  Drug not on the [facility] refill protocol     Last Written Prescription Date:  2/25/2022  Last Fill Quantity: 90,  # refills: 1   Last office visit provider:  3/10/2022     Requested Prescriptions   Pending Prescriptions Disp Refills     tiZANidine (ZANAFLEX) 4 MG tablet [Pharmacy Med Name: TIZANIDINE 4MG TABLETS] 90 tablet 1     Sig: TAKE 3 TABLETS BY MOUTH AT BEDTIME       There is no refill protocol information for this order          Rosa Pierre RN 04/26/22 1:47 PM

## 2022-05-06 ENCOUNTER — OFFICE VISIT (OUTPATIENT)
Dept: INTERNAL MEDICINE | Facility: CLINIC | Age: 32
End: 2022-05-06
Payer: COMMERCIAL

## 2022-05-06 VITALS
BODY MASS INDEX: 21.73 KG/M2 | SYSTOLIC BLOOD PRESSURE: 115 MMHG | DIASTOLIC BLOOD PRESSURE: 80 MMHG | HEIGHT: 66 IN | HEART RATE: 64 BPM | WEIGHT: 135.2 LBS | OXYGEN SATURATION: 99 %

## 2022-05-06 DIAGNOSIS — Z00.00 ANNUAL PHYSICAL EXAM: Primary | ICD-10-CM

## 2022-05-06 DIAGNOSIS — R53.83 FATIGUE, UNSPECIFIED TYPE: ICD-10-CM

## 2022-05-06 DIAGNOSIS — E03.9 ACQUIRED HYPOTHYROIDISM: ICD-10-CM

## 2022-05-06 DIAGNOSIS — Z23 HIGH PRIORITY FOR 2019-NCOV VACCINE: ICD-10-CM

## 2022-05-06 DIAGNOSIS — E55.9 VITAMIN D DEFICIENCY: ICD-10-CM

## 2022-05-06 DIAGNOSIS — F33.42 RECURRENT MAJOR DEPRESSIVE EPISODES, IN FULL REMISSION (H): ICD-10-CM

## 2022-05-06 LAB
ANION GAP SERPL CALCULATED.3IONS-SCNC: 10 MMOL/L (ref 5–18)
BUN SERPL-MCNC: 9 MG/DL (ref 8–22)
CALCIUM SERPL-MCNC: 9.1 MG/DL (ref 8.5–10.5)
CHLORIDE BLD-SCNC: 104 MMOL/L (ref 98–107)
CO2 SERPL-SCNC: 24 MMOL/L (ref 22–31)
CREAT SERPL-MCNC: 0.78 MG/DL (ref 0.6–1.3)
ERYTHROCYTE [DISTWIDTH] IN BLOOD BY AUTOMATED COUNT: 11.4 % (ref 10–15)
GFR SERPL CREATININE-BSD FRML MDRD: >90 ML/MIN/1.73M2
GLUCOSE BLD-MCNC: 76 MG/DL (ref 70–125)
HCT VFR BLD AUTO: 38.4 % (ref 35–53)
HGB BLD-MCNC: 12.7 G/DL (ref 11.7–17.7)
MCH RBC QN AUTO: 31.5 PG (ref 26.5–33)
MCHC RBC AUTO-ENTMCNC: 33.1 G/DL (ref 31.5–36.5)
MCV RBC AUTO: 95 FL (ref 78–100)
PLATELET # BLD AUTO: 278 10E3/UL (ref 150–450)
POTASSIUM BLD-SCNC: 3.6 MMOL/L (ref 3.5–5)
RBC # BLD AUTO: 4.03 10E6/UL (ref 3.8–5.9)
SODIUM SERPL-SCNC: 138 MMOL/L (ref 136–145)
TSH SERPL DL<=0.005 MIU/L-ACNC: 0.86 UIU/ML (ref 0.3–5)
WBC # BLD AUTO: 5.3 10E3/UL (ref 4–11)

## 2022-05-06 PROCEDURE — 0064A COVID-19,PF,MODERNA (18+ YRS BOOSTER .25ML): CPT | Performed by: INTERNAL MEDICINE

## 2022-05-06 PROCEDURE — 80048 BASIC METABOLIC PNL TOTAL CA: CPT | Performed by: INTERNAL MEDICINE

## 2022-05-06 PROCEDURE — 91306 COVID-19,PF,MODERNA (18+ YRS BOOSTER .25ML): CPT | Performed by: INTERNAL MEDICINE

## 2022-05-06 PROCEDURE — 99395 PREV VISIT EST AGE 18-39: CPT | Mod: 25 | Performed by: INTERNAL MEDICINE

## 2022-05-06 PROCEDURE — 82306 VITAMIN D 25 HYDROXY: CPT | Performed by: INTERNAL MEDICINE

## 2022-05-06 PROCEDURE — 85027 COMPLETE CBC AUTOMATED: CPT | Performed by: INTERNAL MEDICINE

## 2022-05-06 PROCEDURE — 36415 COLL VENOUS BLD VENIPUNCTURE: CPT | Performed by: INTERNAL MEDICINE

## 2022-05-06 PROCEDURE — 84443 ASSAY THYROID STIM HORMONE: CPT | Performed by: INTERNAL MEDICINE
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
## 2022-05-07 ENCOUNTER — MYC MEDICAL ADVICE (OUTPATIENT)
Dept: INTERNAL MEDICINE | Facility: CLINIC | Age: 32
End: 2022-05-07
Payer: COMMERCIAL

## 2022-05-07 DIAGNOSIS — F41.9 ANXIETY: ICD-10-CM

## 2022-05-07 LAB — DEPRECATED CALCIDIOL+CALCIFEROL SERPL-MC: 59 UG/L (ref 20–75)

## 2022-05-09 RX ORDER — ESCITALOPRAM OXALATE 10 MG/1
10 TABLET ORAL DAILY
Qty: 90 TABLET | Refills: 3 | Status: SHIPPED | OUTPATIENT
Start: 2022-05-09 | End: 2022-05-12

## 2022-05-09 NOTE — TELEPHONE ENCOUNTER
Justa msg:  Dr. Montemayor,      Could you please send a refill of Lexapro to the [facility] when you get a chance on Monday?      Thank you!      Med is vinh'd up for verification and approval, if appropriate.     Thank you,  Steven Claudio Jr., CMA on 5/9/2022 at 10:49 AM

## 2022-05-12 RX ORDER — ESCITALOPRAM OXALATE 10 MG/1
10 TABLET ORAL DAILY
Qty: 90 TABLET | Refills: 3 | Status: SHIPPED | OUTPATIENT
Start: 2022-05-12 | End: 2023-05-10

## 2022-05-20 ENCOUNTER — MYC MEDICAL ADVICE (OUTPATIENT)
Dept: INTERNAL MEDICINE | Facility: CLINIC | Age: 32
End: 2022-05-20
Payer: COMMERCIAL

## 2022-05-20 DIAGNOSIS — M79.7 FIBROMYALGIA: ICD-10-CM

## 2022-05-24 ENCOUNTER — MYC MEDICAL ADVICE (OUTPATIENT)
Dept: INTERNAL MEDICINE | Facility: CLINIC | Age: 32
End: 2022-05-24
Payer: COMMERCIAL

## 2022-05-24 DIAGNOSIS — Z78.9 USES BIRTH CONTROL: ICD-10-CM

## 2022-05-24 DIAGNOSIS — E03.9 ACQUIRED HYPOTHYROIDISM: ICD-10-CM

## 2022-05-25 RX ORDER — TIMOLOL MALEATE 5 MG/ML
1 SOLUTION/ DROPS OPHTHALMIC DAILY
Qty: 90 TABLET | Refills: 3 | Status: SHIPPED | OUTPATIENT
Start: 2022-05-25 | End: 2023-05-10

## 2022-05-25 RX ORDER — LEVOTHYROXINE SODIUM 100 UG/1
TABLET ORAL
Qty: 90 TABLET | Refills: 3 | Status: SHIPPED | OUTPATIENT
Start: 2022-05-25 | End: 2023-05-12

## 2022-05-25 NOTE — TELEPHONE ENCOUNTER
Justa chauhang:   Can you send a refill of the above prescriptions to the [facility]?      Thank you!         Meds being requested are vinh'd up for verification and approval, if appropriate.       Thank you,   Steven Claudio Jr., [facility] on 5/25/2022 at 1:05 PM

## 2022-07-24 DIAGNOSIS — F41.1 GENERALIZED ANXIETY DISORDER: ICD-10-CM

## 2022-07-24 DIAGNOSIS — F40.10 SOCIAL ANXIETY DISORDER: ICD-10-CM

## 2022-07-25 RX ORDER — BUSPIRONE HYDROCHLORIDE 5 MG/1
TABLET ORAL
Qty: 60 TABLET | Refills: 3 | Status: SHIPPED | OUTPATIENT
Start: 2022-07-25 | End: 2023-05-10

## 2022-07-25 NOTE — TELEPHONE ENCOUNTER
"Last Written Prescription Date:  3/3/2022  Last Fill Quantity: 60,  # refills: 3   Last office visit provider:  5/6/2022     Requested Prescriptions   Pending Prescriptions Disp Refills     busPIRone (BUSPAR) 5 MG tablet [Pharmacy Med Name: busPIRone 5MG TABS] 60 tablet 3     Sig: TAKE 1 TABLET BY MOUTH TWO TIMES A DAY       Atypical Antidepressants Protocol Passed - 7/24/2022  6:38 PM        Passed - Recent (12 mo) or future (30 days) visit within the authorizing provider's specialty     Patient has had an office visit with the authorizing provider or a provider within the authorizing providers department within the previous 12 mos or has a future within next 30 days. See \"Patient Info\" tab in inbasket, or \"Choose Columns\" in Meds & Orders section of the refill encounter.              Passed - Medication active on med list        Passed - Patient is age 18 or older        Passed - No active pregnancy on record        Passed - No positive pregnancy test in past 12 mos             Tereza Garza RN 07/25/22 12:20 AM  "

## 2022-09-11 ENCOUNTER — HEALTH MAINTENANCE LETTER (OUTPATIENT)
Age: 32
End: 2022-09-11

## 2023-04-17 ENCOUNTER — MYC MEDICAL ADVICE (OUTPATIENT)
Dept: INTERNAL MEDICINE | Facility: CLINIC | Age: 33
End: 2023-04-17
Payer: COMMERCIAL

## 2023-05-04 ASSESSMENT — ENCOUNTER SYMPTOMS
COUGH: 0
ABDOMINAL PAIN: 0
EYE PAIN: 0
PARESTHESIAS: 0
NERVOUS/ANXIOUS: 0
WEAKNESS: 0
DIARRHEA: 0
CONSTIPATION: 0
HEMATURIA: 0
DIZZINESS: 0
HEARTBURN: 0
JOINT SWELLING: 0
HEMATOCHEZIA: 0
NAUSEA: 0
HEADACHES: 0
SORE THROAT: 0
FREQUENCY: 0
ARTHRALGIAS: 0
SHORTNESS OF BREATH: 0
BREAST MASS: 0
FEVER: 0
CHILLS: 0
MYALGIAS: 0
PALPITATIONS: 0
DYSURIA: 0

## 2023-05-10 ENCOUNTER — TELEPHONE (OUTPATIENT)
Dept: INTERNAL MEDICINE | Facility: CLINIC | Age: 33
End: 2023-05-10

## 2023-05-10 ENCOUNTER — OFFICE VISIT (OUTPATIENT)
Dept: INTERNAL MEDICINE | Facility: CLINIC | Age: 33
End: 2023-05-10
Payer: COMMERCIAL

## 2023-05-10 VITALS
HEIGHT: 66 IN | SYSTOLIC BLOOD PRESSURE: 107 MMHG | HEART RATE: 66 BPM | BODY MASS INDEX: 22.19 KG/M2 | WEIGHT: 138.1 LBS | DIASTOLIC BLOOD PRESSURE: 70 MMHG | RESPIRATION RATE: 12 BRPM | TEMPERATURE: 99.2 F | OXYGEN SATURATION: 97 %

## 2023-05-10 DIAGNOSIS — Z00.00 HEALTHCARE MAINTENANCE: ICD-10-CM

## 2023-05-10 DIAGNOSIS — Z12.4 CERVICAL CANCER SCREENING: ICD-10-CM

## 2023-05-10 DIAGNOSIS — L65.9 HAIR LOSS: ICD-10-CM

## 2023-05-10 DIAGNOSIS — E03.9 ACQUIRED HYPOTHYROIDISM: ICD-10-CM

## 2023-05-10 DIAGNOSIS — N89.8 VAGINAL ITCHING: ICD-10-CM

## 2023-05-10 DIAGNOSIS — Z00.00 ANNUAL PHYSICAL EXAM: Primary | ICD-10-CM

## 2023-05-10 DIAGNOSIS — E55.9 VITAMIN D DEFICIENCY: ICD-10-CM

## 2023-05-10 DIAGNOSIS — F41.9 ANXIETY: ICD-10-CM

## 2023-05-10 DIAGNOSIS — M79.7 FIBROMYALGIA: ICD-10-CM

## 2023-05-10 DIAGNOSIS — Z13.220 LIPID SCREENING: ICD-10-CM

## 2023-05-10 DIAGNOSIS — Z23 HIGH PRIORITY FOR 2019-NCOV VACCINE: ICD-10-CM

## 2023-05-10 DIAGNOSIS — N89.8 VAGINAL ITCHING: Primary | ICD-10-CM

## 2023-05-10 DIAGNOSIS — Z78.9 USES BIRTH CONTROL: ICD-10-CM

## 2023-05-10 LAB
ANION GAP SERPL CALCULATED.3IONS-SCNC: 12 MMOL/L (ref 7–15)
BASOPHILS # BLD AUTO: 0 10E3/UL (ref 0–0.2)
BASOPHILS NFR BLD AUTO: 0 %
BUN SERPL-MCNC: 7 MG/DL (ref 6–20)
CALCIUM SERPL-MCNC: 9 MG/DL (ref 8.6–10)
CHLORIDE SERPL-SCNC: 105 MMOL/L (ref 98–107)
CHOLEST SERPL-MCNC: 162 MG/DL
CLUE CELLS: ABNORMAL
CREAT SERPL-MCNC: 0.81 MG/DL (ref 0.51–1.17)
DEPRECATED HCO3 PLAS-SCNC: 23 MMOL/L (ref 22–29)
EOSINOPHIL # BLD AUTO: 0.1 10E3/UL (ref 0–0.7)
EOSINOPHIL NFR BLD AUTO: 1 %
ERYTHROCYTE [DISTWIDTH] IN BLOOD BY AUTOMATED COUNT: 11.7 % (ref 10–15)
GFR SERPL CREATININE-BSD FRML MDRD: >90 ML/MIN/1.73M2
GLUCOSE SERPL-MCNC: 73 MG/DL (ref 70–99)
HCT VFR BLD AUTO: 37 % (ref 35–53)
HDLC SERPL-MCNC: 55 MG/DL
HGB BLD-MCNC: 12.3 G/DL (ref 11.7–17.7)
IMM GRANULOCYTES # BLD: 0 10E3/UL
IMM GRANULOCYTES NFR BLD: 0 %
LDLC SERPL CALC-MCNC: 87 MG/DL
LYMPHOCYTES # BLD AUTO: 1.7 10E3/UL (ref 0.8–5.3)
LYMPHOCYTES NFR BLD AUTO: 38 %
MCH RBC QN AUTO: 30.9 PG (ref 26.5–33)
MCHC RBC AUTO-ENTMCNC: 33.2 G/DL (ref 31.5–36.5)
MCV RBC AUTO: 93 FL (ref 78–100)
MONOCYTES # BLD AUTO: 0.5 10E3/UL (ref 0–1.3)
MONOCYTES NFR BLD AUTO: 12 %
NEUTROPHILS # BLD AUTO: 2.3 10E3/UL (ref 1.6–8.3)
NEUTROPHILS NFR BLD AUTO: 49 %
NONHDLC SERPL-MCNC: 107 MG/DL
PLATELET # BLD AUTO: 280 10E3/UL (ref 150–450)
POTASSIUM SERPL-SCNC: 4 MMOL/L (ref 3.4–5.3)
RBC # BLD AUTO: 3.98 10E6/UL (ref 3.8–5.9)
SODIUM SERPL-SCNC: 140 MMOL/L (ref 136–145)
TRICHOMONAS, WET PREP: ABNORMAL
TRIGL SERPL-MCNC: 100 MG/DL
TSH SERPL DL<=0.005 MIU/L-ACNC: 0.85 UIU/ML (ref 0.3–4.2)
WBC # BLD AUTO: 4.6 10E3/UL (ref 4–11)
WBC'S/HIGH POWER FIELD, WET PREP: ABNORMAL
YEAST, WET PREP: ABNORMAL

## 2023-05-10 PROCEDURE — 91312 COVID-19 BIVALENT 12+ (PFIZER): CPT | Performed by: INTERNAL MEDICINE

## 2023-05-10 PROCEDURE — 87340 HEPATITIS B SURFACE AG IA: CPT | Performed by: INTERNAL MEDICINE

## 2023-05-10 PROCEDURE — 87210 SMEAR WET MOUNT SALINE/INK: CPT | Performed by: INTERNAL MEDICINE

## 2023-05-10 PROCEDURE — 99214 OFFICE O/P EST MOD 30 MIN: CPT | Mod: 25 | Performed by: INTERNAL MEDICINE

## 2023-05-10 PROCEDURE — 80048 BASIC METABOLIC PNL TOTAL CA: CPT | Performed by: INTERNAL MEDICINE

## 2023-05-10 PROCEDURE — 82306 VITAMIN D 25 HYDROXY: CPT | Performed by: INTERNAL MEDICINE

## 2023-05-10 PROCEDURE — 36415 COLL VENOUS BLD VENIPUNCTURE: CPT | Performed by: INTERNAL MEDICINE

## 2023-05-10 PROCEDURE — 0124A COVID-19 BIVALENT 12+ (PFIZER): CPT | Performed by: INTERNAL MEDICINE

## 2023-05-10 PROCEDURE — 85025 COMPLETE CBC W/AUTO DIFF WBC: CPT | Performed by: INTERNAL MEDICINE

## 2023-05-10 PROCEDURE — 99395 PREV VISIT EST AGE 18-39: CPT | Mod: 25 | Performed by: INTERNAL MEDICINE

## 2023-05-10 PROCEDURE — 80061 LIPID PANEL: CPT | Performed by: INTERNAL MEDICINE

## 2023-05-10 PROCEDURE — 84443 ASSAY THYROID STIM HORMONE: CPT | Performed by: INTERNAL MEDICINE

## 2023-05-10 RX ORDER — BUSPIRONE HYDROCHLORIDE 5 MG/1
5 TABLET ORAL 2 TIMES DAILY
Qty: 180 TABLET | Refills: 3 | Status: SHIPPED | OUTPATIENT
Start: 2023-05-10 | End: 2024-04-02

## 2023-05-10 RX ORDER — LEVOTHYROXINE SODIUM 100 UG/1
TABLET ORAL
Qty: 90 TABLET | Refills: 3 | Status: CANCELLED | OUTPATIENT
Start: 2023-05-10

## 2023-05-10 RX ORDER — ESCITALOPRAM OXALATE 10 MG/1
10 TABLET ORAL DAILY
Qty: 90 TABLET | Refills: 3 | Status: SHIPPED | OUTPATIENT
Start: 2023-05-10 | End: 2024-04-02

## 2023-05-10 RX ORDER — ALBUTEROL SULFATE 90 UG/1
2 AEROSOL, METERED RESPIRATORY (INHALATION) EVERY 6 HOURS PRN
Qty: 18 G | Status: CANCELLED | OUTPATIENT
Start: 2023-05-10

## 2023-05-10 RX ORDER — TIMOLOL MALEATE 5 MG/ML
1 SOLUTION/ DROPS OPHTHALMIC DAILY
Qty: 90 TABLET | Refills: 3 | Status: SHIPPED | OUTPATIENT
Start: 2023-05-10 | End: 2023-10-11

## 2023-05-10 RX ORDER — FLUCONAZOLE 150 MG/1
TABLET ORAL
Qty: 2 TABLET | Refills: 0 | Status: SHIPPED | OUTPATIENT
Start: 2023-05-10 | End: 2023-05-12

## 2023-05-10 ASSESSMENT — ENCOUNTER SYMPTOMS
MYALGIAS: 0
CHILLS: 0
DIZZINESS: 0
SHORTNESS OF BREATH: 0
FEVER: 0
SORE THROAT: 0
COUGH: 0
FREQUENCY: 0
JOINT SWELLING: 0
HEMATURIA: 0
HEADACHES: 0
NAUSEA: 0
WEAKNESS: 0
DYSURIA: 0
ARTHRALGIAS: 0
DIARRHEA: 0
EYE PAIN: 0
NERVOUS/ANXIOUS: 0
ABDOMINAL PAIN: 0
CONSTIPATION: 0
PALPITATIONS: 0
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
## 2023-05-10 NOTE — PATIENT INSTRUCTIONS
Schedule fasting labs.    2. Will check vaginal swab    3. See Rosa Andrew for Pap test.    4. Mammogram starting at age 38    5. Thyroid refill once labs are back.     6. Covid vaccine today

## 2023-05-11 ENCOUNTER — MYC MEDICAL ADVICE (OUTPATIENT)
Dept: INTERNAL MEDICINE | Facility: CLINIC | Age: 33
End: 2023-05-11
Payer: COMMERCIAL

## 2023-05-11 DIAGNOSIS — N89.8 VAGINAL ITCHING: ICD-10-CM

## 2023-05-11 DIAGNOSIS — E03.9 ACQUIRED HYPOTHYROIDISM: ICD-10-CM

## 2023-05-11 LAB
DEPRECATED CALCIDIOL+CALCIFEROL SERPL-MC: 53 UG/L (ref 20–75)
HBV SURFACE AG SERPL QL IA: NONREACTIVE

## 2023-05-12 RX ORDER — FLUCONAZOLE 150 MG/1
TABLET ORAL
Qty: 2 TABLET | Refills: 0 | Status: SHIPPED | OUTPATIENT
Start: 2023-05-12 | End: 2023-10-05

## 2023-05-12 RX ORDER — LEVOTHYROXINE SODIUM 100 UG/1
TABLET ORAL
Qty: 90 TABLET | Refills: 3 | Status: SHIPPED | OUTPATIENT
Start: 2023-05-12 | End: 2023-08-09

## 2023-06-14 NOTE — TELEPHONE ENCOUNTER
Form completed and faxed.   
Work accommodation paperwork from [facility] filled out.  It is in my out box.  Please fax to [facility]: Fax : [phone], attention: [place] team    Please scanned paperwork into my chart and send patient my chart message when paperwork is faxed.  
Has Your Skin Lesion Been Treated?: not been treated
Is This A New Presentation, Or A Follow-Up?: Skin Lesions
Additional History: Check poss SKs vs AKs. No Tx.

## 2023-07-13 ENCOUNTER — OFFICE VISIT (OUTPATIENT)
Dept: MIDWIFE SERVICES | Facility: CLINIC | Age: 33
End: 2023-07-13
Payer: COMMERCIAL

## 2023-07-13 VITALS — SYSTOLIC BLOOD PRESSURE: 112 MMHG | DIASTOLIC BLOOD PRESSURE: 66 MMHG

## 2023-07-13 DIAGNOSIS — L30.9 VULVAR DERMATITIS: ICD-10-CM

## 2023-07-13 DIAGNOSIS — N89.8 VAGINAL ITCHING: Primary | ICD-10-CM

## 2023-07-13 DIAGNOSIS — N89.8 VAGINAL IRRITATION: ICD-10-CM

## 2023-07-13 LAB
CLUE CELLS: ABNORMAL
TRICHOMONAS, WET PREP: ABNORMAL
WBC'S/HIGH POWER FIELD, WET PREP: ABNORMAL
YEAST, WET PREP: ABNORMAL

## 2023-07-13 PROCEDURE — 87210 SMEAR WET MOUNT SALINE/INK: CPT | Performed by: ADVANCED PRACTICE MIDWIFE

## 2023-07-13 PROCEDURE — 99214 OFFICE O/P EST MOD 30 MIN: CPT | Performed by: ADVANCED PRACTICE MIDWIFE

## 2023-07-13 NOTE — PROGRESS NOTES
Assessment:   1.  Vaginal irritation  2.  Vaginal pruritus  3.  Vulvar dermatitis, favor lichen sclerosis     Plan:      1. Discussed nutrition and exercise.    2. Blood tests: declines all HM /screening labs  3. Wet prep.   4. Contraception: Abstinence  5.  Next pap due 2023. HPV co-testing discussed with that pap, then paps q 5 yrs if both negative.  6.  Clobetasol 0.05% ointment: Apply to affected area nightly for 12 weeks, number 45 g, 1 refill  7.  RTC 3 months to evaluate effectiveness of treatment for vulvar dermatitis, suspect lichen sclerosis  8.  RTC in 3 months for annual physical exam, PRN    Subjective:   Mikaela Vernon is a 32 year old female who presents for a problem visit.  Reports vaginal irritation and significant pruritus at vaginal introitus which began in the perianal region.  She was seen by PCP on 5/10/2023 during which a wet prep was obtained (all negative) and oral fluconazole prescribed.  Prescription was mildly helpful, but symptoms did not resolve, and they have exacerbated now.  Denies sexual contact, vaginal malodor.  LMP end of 2023.      Immunization History   Administered Date(s) Administered     COVID-19 Bivalent 12+ (Pfizer) 05/10/2023     COVID-19 MONOVALENT 12+ (Pfizer) 2021, 2021     COVID-19 Monovalent Booster 18+ (Moderna) 2022     Flu 65+ Years 2016     Flu, Unspecified 2017, 10/01/2020     HPV 2016, 2016, 2017     Influenza (IIV3) PF 2016     Influenza Vaccine >6 months (Alfuria,Fluzone) 2021, 10/27/2022     Influenza Vaccine, 6+MO IM (QUADRIVALENT W/PRESERVATIVES) 2017, 2018     TDAP Vaccine (Boostrix) 2016       Gynecologic History  Patient's last menstrual period was 2023.  Contraception: abstinence    Cancer screening:   Last Pap: 2020. Results were: NILM    OB History    Para Term  AB Living   0 0 0 0 0 0   SAB IAB Ectopic Multiple Live Births   0 0 0  0 0       Current Outpatient Medications   Medication Sig Dispense Refill     busPIRone (BUSPAR) 5 MG tablet Take 1 tablet (5 mg) by mouth 2 times daily 180 tablet 3     clobetasol (TEMOVATE) 0.05 % external ointment Apply topically At Bedtime for 90 days 45 g 1     escitalopram (LEXAPRO) 10 MG tablet Take 1 tablet (10 mg) by mouth daily 90 tablet 3     levothyroxine (SYNTHROID/LEVOTHROID) 100 MCG tablet TAKE 1 TABLET(100 MCG) BY MOUTH DAILY 90 tablet 3     Multiple Vitamins-Calcium (ONE-A-DAY WOMENS PO)        tiZANidine (ZANAFLEX) 4 MG tablet TAKE 3 TABLETS BY MOUTH AT BEDTIME 90 tablet 3     VIENVA 0.1-20 MG-MCG tablet Take 1 tablet by mouth daily 90 tablet 3     albuterol (PROAIR HFA/PROVENTIL HFA/VENTOLIN HFA) 108 (90 Base) MCG/ACT inhaler INHALE 2 PUFFS BY MOUTH EVERY 6 HOURS AS NEEDED FOR WHEEZING       fluconazole (DIFLUCAN) 150 MG tablet One tab by mouth every 72 hours for 2 doses. (Patient not taking: Reported on 7/13/2023) 2 tablet 0     fluticasone (FLONASE) 50 MCG/ACT nasal spray Spray 2 sprays into both nostrils daily (Patient not taking: Reported on 5/6/2022) 16 mL 3     Past Medical History:   Diagnosis Date     Anxiety 2006/2007    May be connected to PTSD     Autism      Chickenpox      Chronic pain      Depression      Depressive disorder [id number]    Diagnosed very young     Disease of thyroid gland      Eating disorder     avoidant restrictive food intake d/o     Hypothyroidism 2001/2002    Diagnosed very young.     Migraines 1994/1995    May be connected to hypersensitivity?     Mild intermittent asthma 09/27/2022     PTSD (post-traumatic stress disorder)      Past Surgical History:   Procedure Laterality Date     HC TOOTH EXTRACTION W/FORCEP       WISDOM TOOTH EXTRACTION       Nicotine, Other environmental allergy, Smoke., and Pen vk [penicillin v]  Family History   Problem Relation Age of Onset     Cancer Mother         Recently; cervical     Hypertension Mother      Arthritis Mother       Depression Mother      Mental Illness Mother         EVERYONE in my family is mentally ill     Fibromyalgia Mother      Rheumatologic Disease Mother      Osteoarthritis Mother      Gestational Diabetes Mother      Osteopenia Mother      Post-Traumatic Stress Disorder (PTSD) Mother      Vision Loss Mother      Clotting Disorder Mother      Hearing Loss Mother      Diabetes Father      Mental Illness Father         Possibly bipolar OR borderline     Colon Cancer Father      Leukemia Father      Bipolar Disorder Father      Vision Loss Father      Arthritis Sister         May be chronic pain for her instead     Depression Sister         EVERYONE HAS IT IN MY FAMILY HELP     Mental Illness Sister         PTSD, borderline personality disorder     Substance Abuse Sister         Smokes marijuana for pain relief     Alcoholism Sister      Depression Sister      Post-Traumatic Stress Disorder (PTSD) Sister      Anxiety Disorder Sister      Alcoholism Sister      Asthma Sister      Cervical Cancer Sister      Depression Brother      Attention Deficit Disorder Brother      Alcoholism Brother      Asthma Brother      Substance Abuse Brother      Depression Maternal Grandmother      Cerebrovascular Disease Maternal Grandmother      Cerebrovascular Disease Maternal Grandfather      Depression Maternal Grandfather         Had a history of depression and grief/loss induced     Mental Illness Maternal Grandfather         PTSD; WWII      Breast Cancer Paternal Grandmother      Alcoholism Paternal Grandfather      Social History     Socioeconomic History     Marital status: Single     Spouse name: Not on file     Number of children: 0     Years of education: Not on file     Highest education level: Associate degree: occupational, technical, or vocational program   Occupational History     Occupation:      Employer: [employer]   Tobacco Use     Smoking status: Never     Smokeless tobacco: Never   Vaping Use      Vaping Use: Never used   Substance and Sexual Activity     Alcohol use: No     Alcohol/week: 0.0 standard drinks of alcohol     Drug use: No     Sexual activity: Not Currently   Other Topics Concern     Parent/sibling w/ CABG, MI or angioplasty before 65F 55M? No   Social History Narrative    Single moved to [place] from [place] June 2016.    She works in [employer] at [employer].    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    2 sisters, one brother.      Social Determinants of Health     Financial Resource Strain: Not on file   Food Insecurity: Not on file   Transportation Needs: Not on file   Physical Activity: Not on file   Stress: Not on file   Social Connections: Not on file   Intimate Partner Violence: Not on file   Housing Stability: Not on file       Review of Systems  General:  Denies problem  Gastrointestinal:  Perianal pruritus  Genitourinary: Please see subjective  Skin: Denies problem    Objective:      Vitals:    07/13/23 1749   BP: 112/66       Physical Exam:  General Appearance: Alert, cooperative, no distress, appears stated age  Pelvic:External genitalia normal without lesions or irritation however there are small fissures in the tissue at the introitus.     Contains abnormal data Wet prep - Clinic Collect  Order: [id number]   Status: Final result      Visible to patient: Yes (not seen)      Dx: Vaginal itching     Specimen Information: Vagina; Swab    0 Result Notes     1 Patient Communication           Component Ref Range & Units 1 d ago 2 mo ago    Trichomonas Absent Absent  Absent     Yeast Absent Absent  Absent     Clue Cells Absent Absent  Absent     WBCs/high power field None 2+ Abnormal   1+ Abnormal     [facility]ing Agency  [facility]              Specimen Collected: 07/13/23  6:31 PM Last Resulted: 07/13/23  6:38 PM

## 2023-07-14 PROBLEM — M99.04 SOMATIC DYSFUNCTION OF SACRAL SPINE: Status: ACTIVE | Noted: 2020-08-11

## 2023-07-14 PROBLEM — G43.909 MIGRAINE HEADACHE: Status: ACTIVE | Noted: 2022-09-27

## 2023-07-14 PROBLEM — G44.209 TENSION TYPE HEADACHE: Status: ACTIVE | Noted: 2020-08-11

## 2023-07-14 PROBLEM — J45.20 MILD INTERMITTENT ASTHMA: Status: ACTIVE | Noted: 2022-09-27

## 2023-07-14 PROBLEM — M99.02 SOMATIC DYSFUNCTION OF THORACIC REGION: Status: ACTIVE | Noted: 2020-08-11

## 2023-07-14 RX ORDER — CLOBETASOL PROPIONATE 0.5 MG/G
OINTMENT TOPICAL AT BEDTIME
Qty: 45 G | Refills: 1 | Status: SHIPPED | OUTPATIENT
Start: 2023-07-14 | End: 2023-10-12

## 2023-08-08 ENCOUNTER — MYC MEDICAL ADVICE (OUTPATIENT)
Dept: INTERNAL MEDICINE | Facility: CLINIC | Age: 33
End: 2023-08-08
Payer: COMMERCIAL

## 2023-08-08 DIAGNOSIS — E03.9 ACQUIRED HYPOTHYROIDISM: ICD-10-CM

## 2023-08-09 RX ORDER — LEVOTHYROXINE SODIUM 100 UG/1
TABLET ORAL
Qty: 90 TABLET | Refills: 3 | Status: SHIPPED | OUTPATIENT
Start: 2023-08-09 | End: 2024-04-24

## 2023-08-24 DIAGNOSIS — M79.7 FIBROMYALGIA: ICD-10-CM

## 2023-08-25 DIAGNOSIS — M79.7 FIBROMYALGIA: ICD-10-CM

## 2023-08-25 NOTE — TELEPHONE ENCOUNTER
Routing refill request to provider for review/approval because:  Drug not on the [facility] refill protocol     Last Written Prescription Date:  5/10/23  Last Fill Quantity: 90,  # refills: 3   Last office visit provider:   5/10/23 with arnoldo    Requested Prescriptions   Pending Prescriptions Disp Refills    tiZANidine (ZANAFLEX) 4 MG tablet [Pharmacy Med Name: TIZANIDINE HCL 4MG TABS] 90 tablet 0     Sig: TAKE 3 TABLETS BY MOUTH AT BEDTIME       There is no refill protocol information for this order          CHUNG NGUYEN RN 08/25/23 11:57 AM

## 2023-10-02 NOTE — COMMUNITY RESOURCES LIST (ENGLISH)
10/02/2023   [facility]  N/A  For questions about this resource list or additional care needs, please contact your primary care clinic or care manager.  Phone: [phone]   Email: N/A   Address: [address] [place] [address]   Hours: N/A        Food and Nutrition       Food pantry  1  [facility] Distance: 1.48 miles      In-Person   [address] [place] [address]  Language: English  Hours: Tue 10:00 AM - 4:00 PM , Thu 10:00 AM - 4:00 PM  Fees: Free   Phone: (102) 694-7281 Email: communications@Sendmybag Website: http://www.Sendmybag     2  [address] [place] [address] Food [facility] food pantry Distance: 1.49 miles      Pickup   [address] [place] [address]  Language: English  Hours: Tue 10:00 AM - 4:00 PM , Thu 10:00 AM - 4:00 PM  Fees: Free   Phone: (907) 651-1904 Email: info@[employer] Website: https://www.Plastiques Wolinak.org/     SNAP application assistance  3  [address] [place] [address] Distance: 4.14 miles      In-Person   [address] [place] [address]  Language: English  Hours: Mon 8:00 AM - 4:00 PM , Tue 8:00 AM - 7:00 PM , Wed - Thu 8:00 AM - 4:00 PM  Fees: Free   Phone: (419) 769-2536 Email: info@[employer] Website: https://Plast[employer] Wolinak.org/resources/resource-centers/     4  [facility] ([facility]) [facility] Distance: 5.33 miles      In-Person   [address] [address]  Language: English, Pakistani  Hours: Mon - Fri 8:00 AM - 8:00 PM  Fees: Free   Phone: (944) 313-4091 Email: info@[employer] Kin Packs.org Website: http://www.Go Kin Packs.org     Soup kitchen or free meals  5  [facility] Distance: 2.87 miles      Pickup   [address] [place] [place] [address]  Language: English, Pakistani  Hours: Mon - Thu 5:30 PM - 6:30 PM   Fees: Free   Phone: (994) 670-5468 Email: johnna@[employer] Website: http://Fanattac.Pixta/wordpress/?page_id=5168     6  [facility] Distance: 6.83 miles      Pickup   [address] [place] [address]  Language: English  Hours: Mon - Fri 5:00 PM - 6:00 PM  Fees: Free   Phone: (443) 174-7415 Email: contactus@AdMaster.org Website: https://www.AdMaster.org/          Important Numbers & Websites       Emergency Services   911  [facility] Services   311  Poison Control   (405) 666-4605  Suicide Prevention Lifeline   (369) 142-3573 (TALK)  Child Abuse Hotline   (112) 706-8452 (4-A-Child)  Sexual Assault Hotline   (647) 853-6310 (HOPE)  National Runaway Safeline   (817) 587-7870 (RUNAWAY)  All-Options Talkline   (564) 424-9532  Substance Abuse Referral   (399) 395-7235 (HELP)

## 2023-10-05 ENCOUNTER — OFFICE VISIT (OUTPATIENT)
Dept: MIDWIFE SERVICES | Facility: CLINIC | Age: 33
End: 2023-10-05
Payer: COMMERCIAL

## 2023-10-05 ENCOUNTER — OFFICE VISIT (OUTPATIENT)
Dept: INTERNAL MEDICINE | Facility: CLINIC | Age: 33
End: 2023-10-05
Payer: COMMERCIAL

## 2023-10-05 VITALS — DIASTOLIC BLOOD PRESSURE: 72 MMHG | SYSTOLIC BLOOD PRESSURE: 116 MMHG

## 2023-10-05 VITALS
SYSTOLIC BLOOD PRESSURE: 127 MMHG | DIASTOLIC BLOOD PRESSURE: 81 MMHG | RESPIRATION RATE: 16 BRPM | WEIGHT: 139.3 LBS | HEIGHT: 66 IN | OXYGEN SATURATION: 99 % | TEMPERATURE: 97.5 F | BODY MASS INDEX: 22.39 KG/M2 | HEART RATE: 69 BPM

## 2023-10-05 DIAGNOSIS — J45.20 MILD INTERMITTENT ASTHMA WITHOUT COMPLICATION: ICD-10-CM

## 2023-10-05 DIAGNOSIS — L30.9 VULVAR DERMATITIS: Primary | ICD-10-CM

## 2023-10-05 DIAGNOSIS — Z12.4 CERVICAL CANCER SCREENING: ICD-10-CM

## 2023-10-05 DIAGNOSIS — F84.0 AUTISM SPECTRUM DISORDER: ICD-10-CM

## 2023-10-05 DIAGNOSIS — R48.8 DYSCALCULIA: ICD-10-CM

## 2023-10-05 DIAGNOSIS — L98.9 SKIN LESION: Primary | ICD-10-CM

## 2023-10-05 DIAGNOSIS — Z80.3 FAMILY HISTORY OF MALIGNANT NEOPLASM OF BREAST: ICD-10-CM

## 2023-10-05 PROCEDURE — G0145 SCR C/V CYTO,THINLAYER,RESCR: HCPCS | Performed by: ADVANCED PRACTICE MIDWIFE

## 2023-10-05 PROCEDURE — 99213 OFFICE O/P EST LOW 20 MIN: CPT | Mod: 25 | Performed by: INTERNAL MEDICINE

## 2023-10-05 PROCEDURE — 90480 ADMN SARSCOV2 VAC 1/ONLY CMP: CPT | Performed by: INTERNAL MEDICINE

## 2023-10-05 PROCEDURE — 90686 IIV4 VACC NO PRSV 0.5 ML IM: CPT | Performed by: INTERNAL MEDICINE

## 2023-10-05 PROCEDURE — 87624 HPV HI-RISK TYP POOLED RSLT: CPT | Performed by: ADVANCED PRACTICE MIDWIFE

## 2023-10-05 PROCEDURE — 99214 OFFICE O/P EST MOD 30 MIN: CPT | Performed by: ADVANCED PRACTICE MIDWIFE

## 2023-10-05 PROCEDURE — 90471 IMMUNIZATION ADMIN: CPT | Performed by: INTERNAL MEDICINE

## 2023-10-05 PROCEDURE — 91320 SARSCV2 VAC 30MCG TRS-SUC IM: CPT | Performed by: INTERNAL MEDICINE

## 2023-10-05 ASSESSMENT — PATIENT HEALTH QUESTIONNAIRE - PHQ9
10. IF YOU CHECKED OFF ANY PROBLEMS, HOW DIFFICULT HAVE THESE PROBLEMS MADE IT FOR YOU TO DO YOUR WORK, TAKE CARE OF THINGS AT HOME, OR GET ALONG WITH OTHER PEOPLE: SOMEWHAT DIFFICULT
SUM OF ALL RESPONSES TO PHQ QUESTIONS 1-9: 6
SUM OF ALL RESPONSES TO PHQ QUESTIONS 1-9: 6

## 2023-10-05 NOTE — LETTER
To whom it may concern.     Mikaela Vernon is under my care. 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  Please provide appropriate accommodations due to medical necessity.    Dr Montemayor
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
## 2023-10-05 NOTE — COMMUNITY RESOURCES LIST (ENGLISH)
10/05/2023   [facility]  N/A  For questions about this resource list or additional care needs, please contact your primary care clinic or care manager.  Phone: [phone]   Email: N/A   Address: [address] [place] [address]   Hours: N/A        Food and Nutrition       Food pantry  1  [facility] Distance: 1.48 miles      In-Person   [address] [place] [address]  Language: English  Hours: Tue 10:00 AM - 4:00 PM , Thu 10:00 AM - 4:00 PM  Fees: Free   Phone: (795) 250-8434 Email: communications@Heyy Website: http://www.Heyy     2  [address] [place] [address] Food [facility] food pantry Distance: 1.49 miles      Pickup   [address] [place] [address]  Language: English  Hours: Tue 10:00 AM - 4:00 PM , Thu 10:00 AM - 4:00 PM  Fees: Free   Phone: (701) 295-8451 Email: info@Bolooka.com.Tryouts Website: https://www.Fraudwall Technologies.org/     SNAP application assistance  3  [address] [place] [address] Distance: 4.14 miles      In-Person   [address] [place] [address]  Language: English  Hours: Mon 8:00 AM - 4:00 PM , Tue 8:00 AM - 7:00 PM , Wed - Thu 8:00 AM - 4:00 PM  Fees: Free   Phone: (543) 294-1227 Email: info@Bolooka.com.Tryouts Website: https://Fraudwall Technologies.org/resources/resource-centers/     4  Community Action Partnership ([facility] Distance: 5.33 miles      In-Person   [address] [address]  Language: English, Rwandan  Hours: Mon - Fri 8:00 AM - 8:00 PM  Fees: Free   Phone: (933) 702-3460 Email: info@[employer].org Website: http://www.[employer].org     Soup kitchen or free meals  5  [facility] Distance: 2.87 miles      Pickup   [address] [address]  Language: English, Rwandan  Hours: Mon - Thu 5:30 PM - 6:30 PM   Fees: Free   Phone: (437) 640-1250 Email: johnna@[employer] Website: http://Hango.TriplePulse/wordpress/?page_id=5168     6  [facility] Distance: 6.83 miles      Pickup   [address] [place] [address]  Language: English  Hours: Mon - Fri 5:00 PM - 6:00 PM  Fees: Free   Phone: (147) 794-1600 Email: contactus@[employer].org Website: https://www.[employer].org/          Important Numbers & Websites       Emergency Services   911  [facility] Services   311  Poison Control   (833) 262-5475  Suicide Prevention Lifeline   (115) 440-1206 (TALK)  Child Abuse Hotline   (289) 294-4645 (4-A-Child)  Sexual Assault Hotline   (363) 281-6070 (HOPE)  National Runaway Safeline   (166) 342-2165 (RUNAWAY)  All-Options Talkline   (536) 417-7291  Substance Abuse Referral   (491) 351-8052 (HELP)

## 2023-10-05 NOTE — COMMUNITY RESOURCES LIST (ENGLISH)
10/05/2023   [facility]  N/A  For questions about this resource list or additional care needs, please contact your primary care clinic or care manager.  Phone: [phone]   Email: N/A   Address: [address] [place] [address]   Hours: N/A        Food and Nutrition       Food pantry  1  [facility] Distance: 1.48 miles      In-Person   [address] [place] [address]  Language: English  Hours: Tue 10:00 AM - 4:00 PM , Thu 10:00 AM - 4:00 PM  Fees: Free   Phone: (153) 662-6354 Email: communications@Neurala Website: http://www.Neurala     2  [address] [place] [address] Food [facility] food pantry Distance: 1.49 miles      Pickup   [address] [place] [address]  Language: English  Hours: Tue 10:00 AM - 4:00 PM , Thu 10:00 AM - 4:00 PM  Fees: Free   Phone: (363) 444-6441 Email: info@GlassesGroupGlobal.Geni Website: https://www.Netasq.org/     SNAP application assistance  3  [address] [place] [address] Distance: 4.14 miles      In-Person   [address] [place] [address]  Language: English  Hours: Mon 8:00 AM - 4:00 PM , Tue 8:00 AM - 7:00 PM , Wed - Thu 8:00 AM - 4:00 PM  Fees: Free   Phone: (249) 536-2988 Email: info@Gl[employer]Group[employer].Geni Website: https://Netasq.org/resources/resource-centers/     4  [facility] Partnership ([facility] Distance: 5.33 miles      In-Person   [address], [place] [address]  Language: English, Latvian  Hours: Mon - Fri 8:00 AM - 8:00 PM  Fees: Free   Phone: (178) 173-7794 Email: info@Pidgon.org Website: http://www.Pidgon.org     Soup kitchen or free meals  5  [facility] the [facility] Distance: 2.87 miles      Pickup   [address] [place] [address] [address] [address]  Language: English, Latvian  Hours: Mon - Thu 5:30 PM - 6:30 PM   Fees: Free   Phone: (909) 239-9093 Email: johnna@[employer] Website: http://GetSet.PrecisionPoint Software/wordpress/?page_id=5168     6  [facility] Distance: 6.83 miles      Pickup   [address] [place] [address]  Language: English  Hours: Mon - Fri 5:00 PM - 6:00 PM  Fees: Free   Phone: (406) 815-2973 Email: contactus@[employer]utra [employer].org Website: https://www.[employer]uitSutra Technologies.org/          Important Numbers & Websites       Emergency Services   911  [facility] Services   311  Poison Control   (675) 699-8291  Suicide Prevention Lifeline   (279) 479-2716 (TALK)  Child Abuse Hotline   (819) 224-4116 (4-A-Child)  Sexual Assault Hotline   (442) 605-4502 (HOPE)  National Runaway Safeline   (788) 562-9790 (RUNAWAY)  All-Options Talkline   (495) 402-9312  Substance Abuse Referral   (407) 857-9467 (HELP)

## 2023-10-05 NOTE — PATIENT INSTRUCTIONS
Breast exam is normal, skin lesions are oil glands that are mor prominent.    2. For hair loss: Use sulfate free shampoo.    3. Vaccines today

## 2023-10-06 PROBLEM — L30.9 VULVAR DERMATITIS: Status: ACTIVE | Noted: 2023-10-06

## 2023-10-06 NOTE — PROGRESS NOTES
"Assessment:   1.  Vulvar dermatitis, favor lichen sclerosis, improved after clobetasol  2.  Cervical cancer screening    Plan:      1. Discussed nutrition and exercise.  Advised prenatal vitamin, Vitamin D3 2000IU geltab and an omega 3 supplement daily   2. Blood tests: declines all HM /screening labs  3. Breast self exam technique reviewed and patient encouraged to perform self-exam monthly.   4. Contraception: None  5.  Next pap due today. HPV co-testing discussed with that pap, then paps q 5 yrs if both negative.  6.  Decrease clobetasol ointment to once weekly, then wean off completely and use only as needed.  7.  RTC 1 year for annual physical exam, PRN    Subjective:   Mikaela Vernon is a 32 year old female who presents for follow-up vulvar dermatitis, favor lichen sclerosis, improved after clobetasol ointment treatment.  Patient originally used medication daily.  Now she is down to every third night with symptoms very well managed.  Questions how to wean off of this ointment.  Desires cervical cancer screening today.  She has brought her lidocaine cream which allows more comfort for a speculum and bimanual exam.  Patient underwent egg retrieval for the purposes of storing embryos for future pregnancies.  She utilized an anonymous sperm donor.  \"I have got 3 good embryos out of it, all girls.\"    Immunization History   Administered Date(s) Administered    COVID-19 12+ (2023-24) (Pfizer) 10/05/2023    COVID-19 Bivalent 12+ (Pfizer) 05/10/2023    COVID-19 MONOVALENT 12+ (Pfizer) 05/04/2021, 05/25/2021    COVID-19 Monovalent Booster 18+ (Moderna) 05/06/2022    Flu 65+ Years 09/29/2016    Flu, Unspecified 08/22/2017, 10/01/2020    HPV 11/07/2016, 12/07/2016, 08/31/2017    Influenza (IIV3) PF 09/29/2016    Influenza Vaccine >6 months (Alfuria,Fluzone) 09/20/2021, 10/27/2022, 10/05/2023    Influenza Vaccine, 6+MO IM (QUADRIVALENT W/PRESERVATIVES) 08/22/2017, 11/08/2018    TDAP Vaccine (Boostrix) 09/29/2016 "     Immunization status: up to date and documented.    Gynecologic History  Patient's last menstrual period was 2023.  Contraception: None    Cancer screening:   Last Pap: 2020. Results were: NILM    OB History    Para Term  AB Living   0 0 0 0 0 0   SAB IAB Ectopic Multiple Live Births   0 0 0 0 0       Current Outpatient Medications   Medication Sig Dispense Refill    albuterol (PROAIR HFA/PROVENTIL HFA/VENTOLIN HFA) 108 (90 Base) MCG/ACT inhaler INHALE 2 PUFFS BY MOUTH EVERY 6 HOURS AS NEEDED FOR WHEEZING      busPIRone (BUSPAR) 5 MG tablet Take 1 tablet (5 mg) by mouth 2 times daily 180 tablet 3    clobetasol (TEMOVATE) 0.05 % external ointment Apply topically At Bedtime for 90 days 45 g 1    escitalopram (LEXAPRO) 10 MG tablet Take 1 tablet (10 mg) by mouth daily 90 tablet 3    fluticasone (FLONASE) 50 MCG/ACT nasal spray Spray 2 sprays into both nostrils daily 16 mL 3    levothyroxine (SYNTHROID/LEVOTHROID) 100 MCG tablet TAKE 1 TABLET(100 MCG) BY MOUTH DAILY 90 tablet 3    Multiple Vitamins-Calcium (ONE-A-DAY WOMENS PO)       tiZANidine (ZANAFLEX) 4 MG tablet TAKE 3 TABLETS BY MOUTH AT BEDTIME 270 tablet 1    VIENVA 0.1-20 MG-MCG tablet Take 1 tablet by mouth daily 90 tablet 3     Past Medical History:   Diagnosis Date    Anxiety     May be connected to PTSD    Autism     Chickenpox     Chronic pain     Depression     Depressive disorder [id number]    Diagnosed very young    Disease of thyroid gland     Eating disorder     avoidant restrictive food intake d/o    Hypothyroidism     Diagnosed very young.    Migraines     May be connected to hypersensitivity?    Mild intermittent asthma 2022    PTSD (post-traumatic stress disorder)      Past Surgical History:   Procedure Laterality Date    HC TOOTH EXTRACTION W/FORCEP      WISDOM TOOTH EXTRACTION       Nicotine, Other environmental allergy, Smoke., and Pen vk [penicillin v]  Family History   Problem  Relation Age of Onset    Cancer Mother         Recently; cervical    Hypertension Mother     Arthritis Mother     Depression Mother     Mental Illness Mother         EVERYONE in my family is mentally ill    Fibromyalgia Mother     Rheumatologic Disease Mother     Osteoarthritis Mother     Gestational Diabetes Mother     Osteopenia Mother     Post-Traumatic Stress Disorder (PTSD) Mother     Vision Loss Mother     Clotting Disorder Mother     Hearing Loss Mother     Diabetes Father     Mental Illness Father         Possibly bipolar OR borderline    Colon Cancer Father     Leukemia Father     Bipolar Disorder Father     Vision Loss Father     Arthritis Sister         May be chronic pain for her instead    Depression Sister         EVERYONE HAS IT IN MY FAMILY HELP    Mental Illness Sister         PTSD, borderline personality disorder    Substance Abuse Sister         Smokes marijuana for pain relief    Alcoholism Sister     Depression Sister     Post-Traumatic Stress Disorder (PTSD) Sister     Anxiety Disorder Sister     Alcoholism Sister     Asthma Sister     Cervical Cancer Sister     Depression Brother     Attention Deficit Disorder Brother     Alcoholism Brother     Asthma Brother     Substance Abuse Brother     Depression Maternal Grandmother     Cerebrovascular Disease Maternal Grandmother     Cerebrovascular Disease Maternal Grandfather     Depression Maternal Grandfather         Had a history of depression and grief/loss induced    Mental Illness Maternal Grandfather         PTSD; WWII     Breast Cancer Paternal Grandmother     Alcoholism Paternal Grandfather      Social History     Socioeconomic History    Marital status: Single     Spouse name: Not on file    Number of children: 0    Years of education: Not on file    Highest education level: Associate degree: occupational, technical, or vocational program   Occupational History    Occupation:      Employer: [employer]   Tobacco Use     Smoking status: Never    Smokeless tobacco: Never   Vaping Use    Vaping Use: Never used   Substance and Sexual Activity    Alcohol use: No     Alcohol/week: 0.0 standard drinks of alcohol    Drug use: No    Sexual activity: Not Currently   Other Topics Concern    Parent/sibling w/ CABG, MI or angioplasty before 65F 55M? No   Social History Narrative    Single moved to [place] from [place] June 2016.    She works in [employer] at [employer].    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    2 sisters, one brother.      Social Determinants of Health     Financial Resource Strain: Low Risk  (10/2/2023)    Financial Resource Strain     Within the past 12 months, have you or your family members you live with been unable to get utilities (heat, electricity) when it was really needed?: No   Food Insecurity: High Risk (10/2/2023)    Food Insecurity     Within the past 12 months, did you worry that your food would run out before you got money to buy more?: Yes     Within the past 12 months, did the food you bought just not last and you didn t have money to get more?: Yes   Transportation Needs: Low Risk  (10/2/2023)    Transportation Needs     Within the past 12 months, has lack of transportation kept you from medical appointments, getting your medicines, non-medical meetings or appointments, work, or from getting things that you need?: No   Physical Activity: Not on file   Stress: Not on file   Social Connections: Not on file   Interpersonal Safety: Low Risk  (10/5/2023)    Interpersonal Safety     Do you feel physically and emotionally safe where you currently live?: Yes     Within the past 12 months, have you been hit, slapped, kicked or otherwise physically hurt by someone?: No     Within the past 12 months, have you been humiliated or emotionally abused in other ways by your partner or ex-partner?: No   Housing Stability: Low Risk  (10/2/2023)    Housing Stability      Do you have housing? : Yes     Are you worried about losing your housing?: No       Review of Systems  General:  Denies problem  Eyes: Denies problem  Ears/Nose/Throat: Denies problem  Cardiovascular: Denies problem  Respiratory:  Denies problem  Gastrointestinal:  denies problems  Genitourinary: denies problems  Musculoskeletal:  Denies problem  Skin: Denies problem  Neurologic:denies problems  Psychiatric: denies problems  Endocrine: denies problems        Objective:      Vitals:    10/05/23 1817   BP: 116/72       Physical Exam:  General Appearance: Alert, cooperative, no distress, appears stated age  Pelvic:External genitalia normal without lesions or irritation. Vagina and cervix show no lesions, inflammation, discharge or tenderness. No cystocele, No rectocele. Uterus & adnexal normal without masses or tenderness.  Pap smear obtained.

## 2023-10-10 LAB
BKR LAB AP GYN ADEQUACY: NORMAL
BKR LAB AP GYN INTERPRETATION: NORMAL
BKR LAB AP HPV REFLEX: NORMAL
BKR LAB AP LMP: NORMAL
BKR LAB AP PREVIOUS ABNORMAL: NORMAL
PATH REPORT.COMMENTS IMP SPEC: NORMAL
PATH REPORT.COMMENTS IMP SPEC: NORMAL
PATH REPORT.RELEVANT HX SPEC: NORMAL

## 2023-10-11 ENCOUNTER — TELEPHONE (OUTPATIENT)
Dept: INTERNAL MEDICINE | Facility: CLINIC | Age: 33
End: 2023-10-11
Payer: COMMERCIAL

## 2023-10-11 DIAGNOSIS — Z78.9 USES BIRTH CONTROL: ICD-10-CM

## 2023-10-11 RX ORDER — TIMOLOL MALEATE 5 MG/ML
1 SOLUTION/ DROPS OPHTHALMIC DAILY
Start: 2023-10-11 | End: 2024-03-28

## 2023-10-11 NOTE — TELEPHONE ENCOUNTER
Patient states she is skipping the sugar pills in her birth control packs, we don't have a prescription that reflects this. Please send over a new rx to the pharmacy stating in the directions she is skipping these    thanks

## 2023-10-12 ENCOUNTER — OFFICE VISIT (OUTPATIENT)
Dept: OBGYN | Facility: CLINIC | Age: 33
End: 2023-10-12
Payer: COMMERCIAL

## 2023-10-12 VITALS
HEIGHT: 66 IN | DIASTOLIC BLOOD PRESSURE: 78 MMHG | BODY MASS INDEX: 22.82 KG/M2 | SYSTOLIC BLOOD PRESSURE: 144 MMHG | WEIGHT: 142 LBS

## 2023-10-12 DIAGNOSIS — N92.6 IRREGULAR BLEEDING: Primary | ICD-10-CM

## 2023-10-12 LAB
HUMAN PAPILLOMA VIRUS 16 DNA: NEGATIVE
HUMAN PAPILLOMA VIRUS 18 DNA: NEGATIVE
HUMAN PAPILLOMA VIRUS FINAL DIAGNOSIS: NORMAL
HUMAN PAPILLOMA VIRUS OTHER HR: NEGATIVE

## 2023-10-12 PROCEDURE — 99213 OFFICE O/P EST LOW 20 MIN: CPT | Performed by: OBSTETRICS & GYNECOLOGY

## 2023-10-12 RX ORDER — ESTRADIOL 1 MG/1
1 TABLET ORAL DAILY
Qty: 7 TABLET | Refills: 0 | Status: SHIPPED | OUTPATIENT
Start: 2023-10-12 | End: 2024-04-19

## 2023-10-12 NOTE — PROGRESS NOTES
I spent a total of 15 minutes on the care of various on the day of service including 10 minutes of face-to-face time with remainder in chart review, care coordination, documentation on the day of service.    Mikaela is a 32-year-old P0 who is undergoing infertility evaluation and treatment.  She is interested in IVF more immediately in harvesting eggs for future pregnancies.  She had a Pap smear 1 week ago and has been having heavier and longer than normal cycle.  She notes that the Pap smear was uncomplicated.  She has a complicated history of difficult vaginal exams.    The report from the previous provider that did the Pap smear of the cervix was without lesions and the rest the vaginal exam was normal.    She and I talked regarding starting on an estrogen product as she is sure that this cycle is not normal.  It stands out from the other cycles that she has that tend to vary in terms of length of an entire menses.    Exam is deferred    Assessment is spotting after Pap smear in a patient that is wanting to begin infertility work-up and treatment.  Her Pap smear was normal though HPV is pending.  She is currently on birth control pills.    Plan is to start Estrace for 7 days the cycle she is currently on day #7 of her cycle and return if having further issues.

## 2023-12-15 ENCOUNTER — E-VISIT (OUTPATIENT)
Dept: INTERNAL MEDICINE | Facility: CLINIC | Age: 33
End: 2023-12-15
Payer: COMMERCIAL

## 2023-12-15 ENCOUNTER — TELEPHONE (OUTPATIENT)
Dept: INTERNAL MEDICINE | Facility: CLINIC | Age: 33
End: 2023-12-15

## 2023-12-15 DIAGNOSIS — R09.81 CONGESTION OF PARANASAL SINUS: ICD-10-CM

## 2023-12-15 DIAGNOSIS — J06.9 VIRAL UPPER RESPIRATORY TRACT INFECTION: Primary | ICD-10-CM

## 2023-12-15 DIAGNOSIS — R05.1 ACUTE COUGH: ICD-10-CM

## 2023-12-15 PROCEDURE — 99421 OL DIG E/M SVC 5-10 MIN: CPT | Performed by: INTERNAL MEDICINE
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
## 2024-01-05 ENCOUNTER — MYC MEDICAL ADVICE (OUTPATIENT)
Dept: INTERNAL MEDICINE | Facility: CLINIC | Age: 34
End: 2024-01-05
Payer: COMMERCIAL

## 2024-01-05 DIAGNOSIS — J01.10 ACUTE FRONTAL SINUSITIS, RECURRENCE NOT SPECIFIED: Primary | ICD-10-CM

## 2024-01-09 RX ORDER — DOXYCYCLINE HYCLATE 100 MG
100 TABLET ORAL 2 TIMES DAILY
Qty: 20 TABLET | Refills: 0 | Status: SHIPPED | OUTPATIENT
Start: 2024-01-09 | End: 2024-01-19

## 2024-01-10 ENCOUNTER — MYC MEDICAL ADVICE (OUTPATIENT)
Dept: INTERNAL MEDICINE | Facility: CLINIC | Age: 34
End: 2024-01-10
Payer: COMMERCIAL

## 2024-02-07 DIAGNOSIS — E28.9 OVARIAN DYSFUNCTION: ICD-10-CM

## 2024-02-07 DIAGNOSIS — Z31.83 ENCOUNTER FOR ASSISTED REPRODUCTIVE FERTILITY PROCEDURE CYCLE: Primary | ICD-10-CM

## 2024-02-08 ENCOUNTER — LAB (OUTPATIENT)
Dept: LAB | Facility: CLINIC | Age: 34
End: 2024-02-08
Payer: COMMERCIAL

## 2024-02-08 DIAGNOSIS — E28.9 OVARIAN DYSFUNCTION: ICD-10-CM

## 2024-02-08 DIAGNOSIS — Z31.83 ENCOUNTER FOR ASSISTED REPRODUCTIVE FERTILITY PROCEDURE CYCLE: ICD-10-CM

## 2024-02-08 LAB
ESTRADIOL SERPL-MCNC: 16 PG/ML
FSH SERPL IRP2-ACNC: 6.6 MIU/ML (ref 1.5–134.8)
HCG INTACT+B SERPL-ACNC: <1 MIU/ML
LH SERPL-ACNC: 1.5 MIU/ML
PROGEST SERPL-MCNC: 0.2 NG/ML
TSH SERPL DL<=0.005 MIU/L-ACNC: 0.77 UIU/ML (ref 0.3–4.2)

## 2024-02-08 PROCEDURE — 36415 COLL VENOUS BLD VENIPUNCTURE: CPT

## 2024-02-08 PROCEDURE — 83001 ASSAY OF GONADOTROPIN (FSH): CPT

## 2024-02-08 PROCEDURE — 84702 CHORIONIC GONADOTROPIN TEST: CPT

## 2024-02-08 PROCEDURE — 82670 ASSAY OF TOTAL ESTRADIOL: CPT

## 2024-02-08 PROCEDURE — 83002 ASSAY OF GONADOTROPIN (LH): CPT

## 2024-02-08 PROCEDURE — 84144 ASSAY OF PROGESTERONE: CPT

## 2024-02-08 PROCEDURE — 84443 ASSAY THYROID STIM HORMONE: CPT

## 2024-02-13 DIAGNOSIS — Z31.83 ENCOUNTER FOR ASSISTED REPRODUCTIVE FERTILITY PROCEDURE CYCLE: Primary | ICD-10-CM

## 2024-02-16 ENCOUNTER — LAB (OUTPATIENT)
Dept: LAB | Facility: CLINIC | Age: 34
End: 2024-02-16
Payer: COMMERCIAL

## 2024-02-16 DIAGNOSIS — Z31.83 ENCOUNTER FOR ASSISTED REPRODUCTIVE FERTILITY PROCEDURE CYCLE: ICD-10-CM

## 2024-02-16 PROCEDURE — 36415 COLL VENOUS BLD VENIPUNCTURE: CPT

## 2024-02-16 PROCEDURE — 82670 ASSAY OF TOTAL ESTRADIOL: CPT

## 2024-02-16 PROCEDURE — 83002 ASSAY OF GONADOTROPIN (LH): CPT

## 2024-02-16 PROCEDURE — 84144 ASSAY OF PROGESTERONE: CPT

## 2024-02-17 LAB
ESTRADIOL SERPL-MCNC: 464 PG/ML
LH SERPL-ACNC: 1.2 MIU/ML
PROGEST SERPL-MCNC: 0.2 NG/ML

## 2024-02-18 DIAGNOSIS — M79.7 FIBROMYALGIA: ICD-10-CM

## 2024-02-19 ENCOUNTER — LAB (OUTPATIENT)
Dept: LAB | Facility: CLINIC | Age: 34
End: 2024-02-19
Payer: COMMERCIAL

## 2024-02-19 DIAGNOSIS — Z31.83 ENCOUNTER FOR ASSISTED REPRODUCTIVE FERTILITY CYCLE: Primary | ICD-10-CM

## 2024-02-19 LAB
ESTRADIOL SERPL-MCNC: 902 PG/ML
LH SERPL-ACNC: <0.3 MIU/ML
PROGEST SERPL-MCNC: 0.3 NG/ML

## 2024-02-19 PROCEDURE — 84144 ASSAY OF PROGESTERONE: CPT

## 2024-02-19 PROCEDURE — 83002 ASSAY OF GONADOTROPIN (LH): CPT

## 2024-02-19 PROCEDURE — 36415 COLL VENOUS BLD VENIPUNCTURE: CPT

## 2024-02-19 PROCEDURE — 82670 ASSAY OF TOTAL ESTRADIOL: CPT

## 2024-03-20 ENCOUNTER — TELEPHONE (OUTPATIENT)
Dept: INTERNAL MEDICINE | Facility: CLINIC | Age: 34
End: 2024-03-20
Payer: COMMERCIAL

## 2024-03-20 NOTE — TELEPHONE ENCOUNTER
Please ask pt t set up VV or tele visit to discuss accomodation paper work.  Same day appts are ok

## 2024-03-20 NOTE — TELEPHONE ENCOUNTER
March 20, 2024    [facility] Certification of Provider Re:  Accommodations was received via fax for Dr. Montemayor to sign.  Patient label was attached to paperwork and placed in provider's inbox to be signed.    Tawana Rocha

## 2024-03-28 DIAGNOSIS — Z78.9 USES BIRTH CONTROL: ICD-10-CM

## 2024-03-28 RX ORDER — TIMOLOL MALEATE 5 MG/ML
1 SOLUTION/ DROPS OPHTHALMIC DAILY
Qty: 84 TABLET | Refills: 2 | Status: SHIPPED | OUTPATIENT
Start: 2024-03-28

## 2024-03-30 DIAGNOSIS — F41.9 ANXIETY: ICD-10-CM

## 2024-04-02 RX ORDER — ESCITALOPRAM OXALATE 10 MG/1
10 TABLET ORAL DAILY
Qty: 90 TABLET | Refills: 2 | Status: SHIPPED | OUTPATIENT
Start: 2024-04-02

## 2024-04-02 RX ORDER — BUSPIRONE HYDROCHLORIDE 5 MG/1
5 TABLET ORAL 2 TIMES DAILY
Qty: 180 TABLET | Refills: 2 | Status: SHIPPED | OUTPATIENT
Start: 2024-04-02

## 2024-04-18 ENCOUNTER — MYC MEDICAL ADVICE (OUTPATIENT)
Dept: INTERNAL MEDICINE | Facility: CLINIC | Age: 34
End: 2024-04-18
Payer: COMMERCIAL

## 2024-04-18 SDOH — HEALTH STABILITY: PHYSICAL HEALTH: ON AVERAGE, HOW MANY MINUTES DO YOU ENGAGE IN EXERCISE AT THIS LEVEL?: 60 MIN

## 2024-04-18 SDOH — HEALTH STABILITY: PHYSICAL HEALTH: ON AVERAGE, HOW MANY DAYS PER WEEK DO YOU ENGAGE IN MODERATE TO STRENUOUS EXERCISE (LIKE A BRISK WALK)?: 7 DAYS

## 2024-04-18 ASSESSMENT — ASTHMA QUESTIONNAIRES
QUESTION_4 LAST FOUR WEEKS HOW OFTEN HAVE YOU USED YOUR RESCUE INHALER OR NEBULIZER MEDICATION (SUCH AS ALBUTEROL): NOT AT ALL
ACT_TOTALSCORE: 25
QUESTION_2 LAST FOUR WEEKS HOW OFTEN HAVE YOU HAD SHORTNESS OF BREATH: NOT AT ALL
QUESTION_5 LAST FOUR WEEKS HOW WOULD YOU RATE YOUR ASTHMA CONTROL: COMPLETELY CONTROLLED
ACT_TOTALSCORE: 25
QUESTION_1 LAST FOUR WEEKS HOW MUCH OF THE TIME DID YOUR ASTHMA KEEP YOU FROM GETTING AS MUCH DONE AT WORK, SCHOOL OR AT HOME: NONE OF THE TIME
QUESTION_3 LAST FOUR WEEKS HOW OFTEN DID YOUR ASTHMA SYMPTOMS (WHEEZING, COUGHING, SHORTNESS OF BREATH, CHEST TIGHTNESS OR PAIN) WAKE YOU UP AT NIGHT OR EARLIER THAN USUAL IN THE MORNING: NOT AT ALL

## 2024-04-18 ASSESSMENT — SOCIAL DETERMINANTS OF HEALTH (SDOH): HOW OFTEN DO YOU GET TOGETHER WITH FRIENDS OR RELATIVES?: MORE THAN THREE TIMES A WEEK

## 2024-04-18 NOTE — COMMUNITY RESOURCES LIST (ENGLISH)
April 18, 2024           YOUR PERSONALIZED LIST OF SERVICES & PROGRAMS           NAVIGATION    Eligibility Screening      [facility] application assistance - [facility]  [id number] [place] [address] (Distance: 2.4 miles)  Phone: [phone]  Language: Italian, English, Mongolian, Finnish  Fee: Free  Accessibility: Ada accessible, Translation services      [facility] application assistance - [facility]  [id number] [place] [address] (Distance: 2.4 miles)  Phone: [phone]  Language: English, Mongolian  Fee: Free  Accessibility: Ada accessible, Blind accommodation, Deaf or hard of hearing, Translation services      [employer]  Phone: [phone]  Website: https://www.SendinBlueure.org/about-us/assister-program/navigators/index.jsp  Language: English  Hours: Mon 8:00 AM - 4:00 PM Tue 8:00 AM - 4:00 PM Wed 8:00 AM - 4:00 PM Thu 8:00 AM - 4:00 PM        ASSISTANCE    Nutrition Benefits      [facility] application assistance [facility]  [id number] [place] [address] (Distance: 2.4 miles)  Phone: [phone]  Language: English, Mongolian  Fee: Free  Accessibility: Ada accessible, Blind accommodation, Deaf or hard of hearing, Translation services      [facility] application assistance - [facility]  [id number] [place] [address] (Distance: 2.4 miles)  Phone: [phone]  Language: Italian, English, Mongolian, Finnish  Fee: Free  Accessibility: Ada accessible, Translation services      [employer] [place] - SNAP (formerly food stamps) Screening and Application help  Phone: [phone]) [phone]  Website: https://www.hungersolutions.org/programs/[place]-food-helpline/  Language: English  Hours: Mon 10:00 AM - 5:00  PM Tue 10:00 AM - 5:00 PM Wed 10:00 AM - 5:00 PM Thu 10:00 AM - 5:00 PM Fri 10:00 AM - 5:00 PM  Fee: Free  Accessibility: Ada accessible, Blind accommodation, Deaf or hard of hearing, Translation services    Pantry      [facility] - [facility] [place] [facility]  [address] [place] [address] (Distance: 3.1 miles)  Phone: [phone]) [phone]  Language: English  Fee: Free  Accessibility: Ada accessible      [address] [place] [address] - [employer] [facility] [employer]  [address] [place] [address] (Distance: 1.5 miles)  Phone: [phone]  Language: English  Fee: Free  Accessibility: Blind accommodation, Translation services, Ada accessible      [employer]  Phone: (851) [phone]  Website: https://www.[employer]/empowerment-food-bank  Language: English  Hours: Mon 9:00 AM - 5:00 PM Tue 9:00 AM - 5:00 PM Wed 9:00 AM - 5:00 PM Thu 9:00 AM - 5:00 PM Fri 9:00 AM - 5:00 PM  Fee: Free               IMPORTANT NUMBERS & WEBSITES        Emergency Services  911  .   [facility]  211 http://211unitedway.org  .   Poison Control  (579) 774-1863 http://[place]poison.org http://wis[place]poison.org  .     Suicide and Crisis Lifeline  988 http://988lifeline.org  .   Childhelp National Child Abuse Hotline  438.587.2504 http://Childhelphotline.org   .   National Sexual Assault Hotline  (560) 709-7095 (HOPE) http://Rainn.org   .     National Runaway Safeline  (213) 735-4261 (RUNAWAY) http://1800runaway.org  .   Pregnancy & Postpartum Support  Call/text 735-856-1673  [place]: http://ppsupportmn.org  [place]: http://Redeem&Get.com/wi  .   Substance Abuse National Helpline ([facility])  455-080-HELP (6287) http://Findtreatment.gov   .                DISCLAIMER: These resources have been generated via the [employer] Platform. [employer] does not endorse any service providers mentioned in this resource list. [employer] does not guarantee that the services mentioned in this resource  list will be available to you or will improve your health or wellness.    [facility]

## 2024-04-19 ENCOUNTER — OFFICE VISIT (OUTPATIENT)
Dept: INTERNAL MEDICINE | Facility: CLINIC | Age: 34
End: 2024-04-19
Payer: COMMERCIAL

## 2024-04-19 VITALS
BODY MASS INDEX: 22.85 KG/M2 | SYSTOLIC BLOOD PRESSURE: 108 MMHG | HEART RATE: 75 BPM | RESPIRATION RATE: 17 BRPM | WEIGHT: 142.2 LBS | HEIGHT: 66 IN | TEMPERATURE: 97.1 F | DIASTOLIC BLOOD PRESSURE: 70 MMHG | OXYGEN SATURATION: 98 %

## 2024-04-19 DIAGNOSIS — F84.0 AUTISM SPECTRUM DISORDER: ICD-10-CM

## 2024-04-19 DIAGNOSIS — E03.9 ACQUIRED HYPOTHYROIDISM: ICD-10-CM

## 2024-04-19 DIAGNOSIS — J45.20 MILD INTERMITTENT ASTHMA WITHOUT COMPLICATION: ICD-10-CM

## 2024-04-19 DIAGNOSIS — N92.0 MENORRHAGIA WITH REGULAR CYCLE: ICD-10-CM

## 2024-04-19 DIAGNOSIS — Z00.00 HEALTHCARE MAINTENANCE: ICD-10-CM

## 2024-04-19 DIAGNOSIS — Z00.00 ANNUAL PHYSICAL EXAM: Primary | ICD-10-CM

## 2024-04-19 DIAGNOSIS — F33.41 RECURRENT MAJOR DEPRESSIVE DISORDER, IN PARTIAL REMISSION (H): ICD-10-CM

## 2024-04-19 LAB
BASOPHILS # BLD AUTO: 0 10E3/UL (ref 0–0.2)
BASOPHILS NFR BLD AUTO: 0 %
EOSINOPHIL # BLD AUTO: 0.1 10E3/UL (ref 0–0.7)
EOSINOPHIL NFR BLD AUTO: 1 %
ERYTHROCYTE [DISTWIDTH] IN BLOOD BY AUTOMATED COUNT: 11.4 % (ref 10–15)
HCT VFR BLD AUTO: 40.6 % (ref 35–53)
HGB BLD-MCNC: 13.5 G/DL (ref 11.7–17.7)
IMM GRANULOCYTES # BLD: 0 10E3/UL
IMM GRANULOCYTES NFR BLD: 0 %
LYMPHOCYTES # BLD AUTO: 2.3 10E3/UL (ref 0.8–5.3)
LYMPHOCYTES NFR BLD AUTO: 40 %
MCH RBC QN AUTO: 31.5 PG (ref 26.5–33)
MCHC RBC AUTO-ENTMCNC: 33.3 G/DL (ref 31.5–36.5)
MCV RBC AUTO: 95 FL (ref 78–100)
MONOCYTES # BLD AUTO: 0.5 10E3/UL (ref 0–1.3)
MONOCYTES NFR BLD AUTO: 10 %
NEUTROPHILS # BLD AUTO: 2.7 10E3/UL (ref 1.6–8.3)
NEUTROPHILS NFR BLD AUTO: 49 %
PLATELET # BLD AUTO: 321 10E3/UL (ref 150–450)
RBC # BLD AUTO: 4.28 10E6/UL (ref 3.8–5.9)
WBC # BLD AUTO: 5.6 10E3/UL (ref 4–11)

## 2024-04-19 PROCEDURE — 36415 COLL VENOUS BLD VENIPUNCTURE: CPT | Performed by: INTERNAL MEDICINE

## 2024-04-19 PROCEDURE — 90677 PCV20 VACCINE IM: CPT | Performed by: INTERNAL MEDICINE

## 2024-04-19 PROCEDURE — 83540 ASSAY OF IRON: CPT | Performed by: INTERNAL MEDICINE

## 2024-04-19 PROCEDURE — 99395 PREV VISIT EST AGE 18-39: CPT | Mod: 25 | Performed by: INTERNAL MEDICINE

## 2024-04-19 PROCEDURE — 99214 OFFICE O/P EST MOD 30 MIN: CPT | Mod: 25 | Performed by: INTERNAL MEDICINE

## 2024-04-19 PROCEDURE — 85025 COMPLETE CBC W/AUTO DIFF WBC: CPT | Performed by: INTERNAL MEDICINE

## 2024-04-19 PROCEDURE — 90471 IMMUNIZATION ADMIN: CPT | Performed by: INTERNAL MEDICINE

## 2024-04-19 PROCEDURE — 83550 IRON BINDING TEST: CPT | Performed by: INTERNAL MEDICINE

## 2024-04-19 PROCEDURE — 86706 HEP B SURFACE ANTIBODY: CPT | Performed by: INTERNAL MEDICINE

## 2024-04-19 ASSESSMENT — PAIN SCALES - GENERAL: PAINLEVEL: MILD PAIN (2)
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
## 2024-04-19 NOTE — PATIENT INSTRUCTIONS
If break through bleeding  continues, let me know and we can increase your OCP dose.     2. Red cell count and iron levels today.    3. Pneumonia shot today.

## 2024-04-21 LAB
HBV SURFACE AB SERPL IA-ACNC: >1000 M[IU]/ML
HBV SURFACE AB SERPL IA-ACNC: REACTIVE M[IU]/ML
IRON BINDING CAPACITY (ROCHE): 422 UG/DL (ref 240–430)
IRON SATN MFR SERPL: 38 % (ref 15–46)
IRON SERPL-MCNC: 159 UG/DL (ref 37–157)

## 2024-04-22 NOTE — TELEPHONE ENCOUNTER
April 22, 2024    [facility] Cert of Healthcare Provider RE:  Accommodation was picked up from outbox of Dr. Montemayor.  [place] form status: Paperwork has been reviewed and is complete.  Per initial initial request, this was form sent or ready for pickup: sent via fax to [phone]    Tawana Rocha

## 2024-06-03 ENCOUNTER — MYC REFILL (OUTPATIENT)
Dept: INTERNAL MEDICINE | Facility: CLINIC | Age: 34
End: 2024-06-03
Payer: COMMERCIAL

## 2024-06-03 DIAGNOSIS — E03.9 ACQUIRED HYPOTHYROIDISM: ICD-10-CM

## 2024-06-04 RX ORDER — LEVOTHYROXINE SODIUM 100 UG/1
TABLET ORAL
Qty: 90 TABLET | Refills: 0 | OUTPATIENT
Start: 2024-06-04

## 2024-07-18 DIAGNOSIS — E03.9 ACQUIRED HYPOTHYROIDISM: ICD-10-CM

## 2024-07-18 RX ORDER — LEVOTHYROXINE SODIUM 100 UG/1
TABLET ORAL
Qty: 90 TABLET | Refills: 1 | Status: SHIPPED | OUTPATIENT
Start: 2024-07-18

## 2024-08-12 DIAGNOSIS — M79.7 FIBROMYALGIA: ICD-10-CM

## 2024-08-24 ENCOUNTER — MYC REFILL (OUTPATIENT)
Dept: INTERNAL MEDICINE | Facility: CLINIC | Age: 34
End: 2024-08-24
Payer: COMMERCIAL

## 2024-08-24 DIAGNOSIS — Z78.9 USES BIRTH CONTROL: ICD-10-CM

## 2024-08-26 RX ORDER — TIMOLOL MALEATE 5 MG/ML
1 SOLUTION/ DROPS OPHTHALMIC DAILY
Qty: 84 TABLET | Refills: 2 | OUTPATIENT
Start: 2024-08-26

## 2024-09-25 ENCOUNTER — MYC REFILL (OUTPATIENT)
Dept: INTERNAL MEDICINE | Facility: CLINIC | Age: 34
End: 2024-09-25
Payer: COMMERCIAL

## 2024-09-25 DIAGNOSIS — M79.7 FIBROMYALGIA: ICD-10-CM

## 2024-12-11 DIAGNOSIS — F41.9 ANXIETY: ICD-10-CM

## 2024-12-11 DIAGNOSIS — E03.9 ACQUIRED HYPOTHYROIDISM: ICD-10-CM

## 2024-12-12 ENCOUNTER — MYC REFILL (OUTPATIENT)
Dept: INTERNAL MEDICINE | Facility: CLINIC | Age: 34
End: 2024-12-12
Payer: COMMERCIAL

## 2024-12-12 DIAGNOSIS — M79.7 FIBROMYALGIA: ICD-10-CM

## 2024-12-12 DIAGNOSIS — E03.9 ACQUIRED HYPOTHYROIDISM: ICD-10-CM

## 2024-12-12 DIAGNOSIS — F41.9 ANXIETY: ICD-10-CM

## 2024-12-12 RX ORDER — ESCITALOPRAM OXALATE 10 MG/1
10 TABLET ORAL DAILY
Qty: 90 TABLET | Refills: 3 | Status: SHIPPED | OUTPATIENT
Start: 2024-12-12

## 2024-12-12 RX ORDER — ESCITALOPRAM OXALATE 10 MG/1
10 TABLET ORAL DAILY
Qty: 90 TABLET | Refills: 3 | OUTPATIENT
Start: 2024-12-12

## 2024-12-12 RX ORDER — LEVOTHYROXINE SODIUM 100 UG/1
TABLET ORAL
Qty: 90 TABLET | Refills: 0 | OUTPATIENT
Start: 2024-12-12

## 2024-12-12 RX ORDER — LEVOTHYROXINE SODIUM 100 UG/1
TABLET ORAL
Qty: 90 TABLET | Refills: 3 | Status: SHIPPED | OUTPATIENT
Start: 2024-12-12

## 2024-12-12 RX ORDER — BUSPIRONE HYDROCHLORIDE 5 MG/1
5 TABLET ORAL 2 TIMES DAILY
Qty: 180 TABLET | Refills: 3 | Status: SHIPPED | OUTPATIENT
Start: 2024-12-12

## 2024-12-12 RX ORDER — BUSPIRONE HYDROCHLORIDE 5 MG/1
5 TABLET ORAL 2 TIMES DAILY
Qty: 180 TABLET | Refills: 3 | OUTPATIENT
Start: 2024-12-12

## 2025-06-07 ENCOUNTER — HEALTH MAINTENANCE LETTER (OUTPATIENT)
Age: 35
End: 2025-06-07